# Patient Record
Sex: FEMALE | Race: WHITE | NOT HISPANIC OR LATINO | Employment: OTHER | URBAN - METROPOLITAN AREA
[De-identification: names, ages, dates, MRNs, and addresses within clinical notes are randomized per-mention and may not be internally consistent; named-entity substitution may affect disease eponyms.]

---

## 2017-02-20 ENCOUNTER — OFFICE VISIT (OUTPATIENT)
Dept: INTERNAL MEDICINE | Facility: CLINIC | Age: 77
End: 2017-02-20
Payer: MEDICARE

## 2017-02-20 ENCOUNTER — LAB VISIT (OUTPATIENT)
Dept: LAB | Facility: HOSPITAL | Age: 77
End: 2017-02-20
Attending: FAMILY MEDICINE
Payer: MEDICARE

## 2017-02-20 VITALS — WEIGHT: 163.81 LBS | BODY MASS INDEX: 26.33 KG/M2 | HEIGHT: 66 IN

## 2017-02-20 DIAGNOSIS — K25.9 GASTRIC ULCER, UNSPECIFIED CHRONICITY, UNSPECIFIED WHETHER GASTRIC ULCER HEMORRHAGE OR PERFORATION PRESENT: ICD-10-CM

## 2017-02-20 DIAGNOSIS — I10 HTN (HYPERTENSION), BENIGN: ICD-10-CM

## 2017-02-20 DIAGNOSIS — E78.5 HYPERLIPIDEMIA, UNSPECIFIED HYPERLIPIDEMIA TYPE: ICD-10-CM

## 2017-02-20 DIAGNOSIS — Z00.00 PREVENTATIVE HEALTH CARE: ICD-10-CM

## 2017-02-20 DIAGNOSIS — D50.0 IRON DEFICIENCY ANEMIA DUE TO CHRONIC BLOOD LOSS: ICD-10-CM

## 2017-02-20 DIAGNOSIS — I67.2 CEREBRAL ATHEROSCLEROSIS: ICD-10-CM

## 2017-02-20 DIAGNOSIS — I10 HTN (HYPERTENSION), BENIGN: Primary | ICD-10-CM

## 2017-02-20 LAB
ALBUMIN SERPL BCP-MCNC: 4.1 G/DL
ALP SERPL-CCNC: 65 U/L
ALT SERPL W/O P-5'-P-CCNC: 20 U/L
ANION GAP SERPL CALC-SCNC: 11 MMOL/L
AST SERPL-CCNC: 27 U/L
BASOPHILS # BLD AUTO: 0.03 K/UL
BASOPHILS NFR BLD: 0.4 %
BILIRUB SERPL-MCNC: 0.8 MG/DL
BUN SERPL-MCNC: 15 MG/DL
CALCIUM SERPL-MCNC: 8.9 MG/DL
CHLORIDE SERPL-SCNC: 104 MMOL/L
CHOLEST/HDLC SERPL: 4.9 {RATIO}
CO2 SERPL-SCNC: 26 MMOL/L
CREAT SERPL-MCNC: 1.3 MG/DL
DIFFERENTIAL METHOD: NORMAL
EOSINOPHIL # BLD AUTO: 0.1 K/UL
EOSINOPHIL NFR BLD: 1.5 %
ERYTHROCYTE [DISTWIDTH] IN BLOOD BY AUTOMATED COUNT: 14.2 %
EST. GFR  (AFRICAN AMERICAN): 45.4 ML/MIN/1.73 M^2
EST. GFR  (NON AFRICAN AMERICAN): 39.4 ML/MIN/1.73 M^2
GLUCOSE SERPL-MCNC: 88 MG/DL
HCT VFR BLD AUTO: 41.8 %
HDL/CHOLESTEROL RATIO: 20.2 %
HDLC SERPL-MCNC: 336 MG/DL
HDLC SERPL-MCNC: 68 MG/DL
HGB BLD-MCNC: 13.7 G/DL
LDLC SERPL CALC-MCNC: 236.4 MG/DL
LYMPHOCYTES # BLD AUTO: 1.8 K/UL
LYMPHOCYTES NFR BLD: 22 %
MCH RBC QN AUTO: 30.6 PG
MCHC RBC AUTO-ENTMCNC: 32.8 %
MCV RBC AUTO: 94 FL
MONOCYTES # BLD AUTO: 0.5 K/UL
MONOCYTES NFR BLD: 6.6 %
NEUTROPHILS # BLD AUTO: 5.7 K/UL
NEUTROPHILS NFR BLD: 69.4 %
NONHDLC SERPL-MCNC: 268 MG/DL
PLATELET # BLD AUTO: 223 K/UL
PMV BLD AUTO: 10.7 FL
POTASSIUM SERPL-SCNC: 4 MMOL/L
PROT SERPL-MCNC: 8 G/DL
RBC # BLD AUTO: 4.47 M/UL
SODIUM SERPL-SCNC: 141 MMOL/L
T4 FREE SERPL-MCNC: 0.48 NG/DL
TRIGL SERPL-MCNC: 158 MG/DL
TSH SERPL DL<=0.005 MIU/L-ACNC: 96.86 UIU/ML
WBC # BLD AUTO: 8.23 K/UL

## 2017-02-20 PROCEDURE — 36415 COLL VENOUS BLD VENIPUNCTURE: CPT

## 2017-02-20 PROCEDURE — 99999 PR PBB SHADOW E&M-EST. PATIENT-LVL IV: CPT | Mod: PBBFAC,,, | Performed by: FAMILY MEDICINE

## 2017-02-20 PROCEDURE — 80053 COMPREHEN METABOLIC PANEL: CPT

## 2017-02-20 PROCEDURE — 99215 OFFICE O/P EST HI 40 MIN: CPT | Mod: S$PBB,,, | Performed by: FAMILY MEDICINE

## 2017-02-20 PROCEDURE — 83036 HEMOGLOBIN GLYCOSYLATED A1C: CPT

## 2017-02-20 PROCEDURE — 85025 COMPLETE CBC W/AUTO DIFF WBC: CPT

## 2017-02-20 PROCEDURE — 84439 ASSAY OF FREE THYROXINE: CPT

## 2017-02-20 PROCEDURE — 80061 LIPID PANEL: CPT

## 2017-02-20 PROCEDURE — 84443 ASSAY THYROID STIM HORMONE: CPT

## 2017-02-20 RX ORDER — CLONIDINE HYDROCHLORIDE 0.1 MG/1
0.1 TABLET ORAL
Status: DISCONTINUED | OUTPATIENT
Start: 2017-02-20 | End: 2022-04-21

## 2017-02-20 NOTE — PROGRESS NOTES
Subjective:       Patient ID: Daniela Harris is a 76 y.o. female.    Chief Complaint: No chief complaint on file.  Daniela Harris 76 y.o. female is here for office visit to review care and physical exam, reports doing well but has concern over episode where passed out?  Is active w/o c/o today.      HPI  Review of Systems   Constitutional: Negative for activity change, fatigue, fever and unexpected weight change.   HENT: Negative for congestion, hearing loss, postnasal drip and rhinorrhea.    Eyes: Negative for redness and visual disturbance.   Respiratory: Negative for chest tightness, shortness of breath and wheezing.    Cardiovascular: Negative for chest pain, palpitations and leg swelling.   Gastrointestinal: Negative for abdominal distention.   Genitourinary: Negative for decreased urine volume, dysuria, flank pain, hematuria, pelvic pain and urgency.   Musculoskeletal: Negative for back pain, gait problem, joint swelling and neck stiffness.   Skin: Negative for color change, rash and wound.   Neurological: Negative for dizziness, syncope, weakness and headaches.   Psychiatric/Behavioral: Negative for behavioral problems, confusion and sleep disturbance. The patient is not nervous/anxious.        Objective:      Physical Exam   Constitutional: She is oriented to person, place, and time. She appears well-developed and well-nourished. No distress.   HENT:   Head: Normocephalic.   Mouth/Throat: No oropharyngeal exudate.   Eyes: EOM are normal. Pupils are equal, round, and reactive to light. No scleral icterus.   Neck: Neck supple. No JVD present. No thyromegaly present.   Cardiovascular: Normal rate, regular rhythm and normal heart sounds.  Exam reveals no gallop and no friction rub.    No murmur heard.  Pulmonary/Chest: Effort normal and breath sounds normal. She has no wheezes. She has no rales.   Abdominal: Soft. Bowel sounds are normal. She exhibits no distension and no mass. There is no tenderness. There is  no guarding.   Musculoskeletal: Normal range of motion. She exhibits no edema.   Lymphadenopathy:     She has no cervical adenopathy.   Neurological: She is alert and oriented to person, place, and time. She has normal reflexes. She displays normal reflexes. No cranial nerve deficit. She exhibits normal muscle tone.   Skin: Skin is warm. No rash noted. No erythema.   Psychiatric: She has a normal mood and affect. Thought content normal.       Assessment:       No diagnosis found.    Plan:       Daniela was seen today for follow-up.    Diagnoses and all orders for this visit:    HTN (hypertension), benign, didn't use meds this AM  -     Comprehensive metabolic panel; Future  -     cloNIDine tablet 0.1 mg; Take 1 tablet (0.1 mg total) by mouth one time.    Hyperlipidemia, unspecified hyperlipidemia type  -     Comprehensive metabolic panel; Future  -     Lipid panel; Future    Cerebral atherosclerosis  -     Ambulatory Referral to Neurology    Gastric ulcer, unspecified chronicity, unspecified whether gastric ulcer hemorrhage or perforation present  -     CBC auto differential; Future  -     Ambulatory Referral to Gastroenterology    Iron deficiency anemia due to chronic blood loss  -     CBC auto differential; Future  -     Ambulatory Referral to Gastroenterology    Preventative health care  -     Comprehensive metabolic panel; Future  -     Lipid panel; Future  -     CBC auto differential; Future  -     Hemoglobin A1c; Future  -     TSH; Future  -     Mammo Digital Screening Bilateral With CAD; Future  -     Ambulatory Referral to Gastroenterology  -     Ambulatory Referral to Neurology

## 2017-02-20 NOTE — MR AVS SNAPSHOT
[facility]  [address]  [facility] [address]  Phone: [phone]  Fax: [phone]                  Daniela Harris   2017 11:20 AM   Office Visit    Description:  Female : 1940   Provider:  Pranay Brown MD   Department:  [facility]           Reason for Visit     Follow-up           Diagnoses this Visit        Comments    HTN (hypertension), benign    -  Primary     Hyperlipidemia, unspecified hyperlipidemia type         Cerebral atherosclerosis         Gastric ulcer, unspecified chronicity, unspecified whether gastric ulcer hemorrhage or perforation present         Iron deficiency anemia due to chronic blood loss         Preventative health care                To Do List           Goals (5 Years of Data)     None      Follow-Up and Disposition     Return in about 3 months (around 2017), or if symptoms worsen or fail to improve.    Follow-up and Disposition History      [facility] On [facility]     [facility] On Call Nurse Care Line -  Assistance  Registered nurses in the [facility] On Call Center provide clinical advisement, health education, appointment booking, and other advisory services.  Call for this free service at [phone].             Medications           Message regarding Medications     Verify the changes and/or additions to your medication regime listed below are the same as discussed with your clinician today.  If any of these changes or additions are incorrect, please notify your healthcare provider.        These medications were administered today        Dose Freq    cloNIDine tablet 0.1 mg 0.1 mg Clinic/HOD 1 time    Sig: Take 1 tablet (0.1 mg total) by mouth one time.    Class: Normal    Route: Oral           Verify that the below list of medications is an accurate representation of the medications you are currently taking.  If none reported, the list may be blank. If incorrect, please contact your healthcare provider. Carry  "this list with you in case of emergency.           Current Medications     amlodipine (NORVASC) 2.5 MG tablet TAKE 1 TABLET BY MOUTH EVERY MORNING.    ascorbic acid (VITAMIN C) 500 MG tablet Take 500 mg by mouth. 1 Tablet Oral Every day    b complex vitamins (VITAMINS B COMPLEX) tablet Take 1 tablet by mouth once daily. 1 Tablet Oral Every day    CALCIUM CARBONATE (CALCIUM 600 ORAL) Take 600 mg by mouth. 1  By mouth Every day    donepezil (ARICEPT) 5 MG tablet Take 1 tablet (5 mg total) by mouth once daily.    ferrous gluconate (FERGON) 324 MG tablet Take 1 tablet (324 mg total) by mouth 2 (two) times daily with meals.    glucosamine & chondroit sul.Na 750-400 mg Cmpk Take by mouth. 1  By mouth Every day    levothyroxine (SYNTHROID) 75 MCG tablet TAKE 1 TABLET BY MOUTH BEFORE BREAKFAST.    lisinopril-hydrochlorothiazide (PRINZIDE,ZESTORETIC) 20-12.5 mg per tablet take 1 tablet by mouth every morning    multivitamin (THERAGRAN) per tablet Take by mouth. 1 Tablet Oral Every day    pravastatin (PRAVACHOL) 20 MG tablet take 1 tablet by mouth once daily    VITAMIN D2 50,000 unit capsule take 1 capsule by mouth every week    aspirin (ECOTRIN) 81 MG EC tablet Take 1 tablet (81 mg total) by mouth once daily.    pantoprazole (PROTONIX) 40 MG tablet Take 1 tablet (40 mg total) by mouth once daily.           Clinical Reference Information           Your Vitals Were     Height Weight BMI          5' 6" (1.676 m) 74.3 kg (163 lb 12.8 oz) 26.44 kg/m2        Allergies as of 2/20/2017     Keflex  [Cephalexin]      Immunizations Administered on Date of Encounter - 2/20/2017     None      Orders Placed During Today's Visit      Normal Orders This Visit    Ambulatory Referral to Gastroenterology     Ambulatory Referral to Neurology     Future Labs/Procedures Expected by Expires    CBC auto differential  2/20/2017 2/20/2018    Comprehensive metabolic panel  2/20/2017 2/20/2018    Hemoglobin A1c  2/20/2017 2/20/2018    Lipid panel  " 2/20/2017 2/20/2018    Mammo Digital Screening Bilateral With CAD  2/20/2017 4/20/2018    TSH  2/20/2017 5/21/2017      [facility] Sign-Up     Activating your [facility] account is as easy as 1-2-3!     1) Visit [email or web address], select Sign Up Now, enter this activation code and your date of birth, then select Next.  [email or web address]  Expires: 4/6/2017 11:44 AM      2) Create a username and password to use when you visit [facility] in the future and select a security question in case you lose your password and select Next.    3) Enter your e-mail address and click Sign Up!    Additional Information  If you have questions, please e-mail [email or web address] or call [phone] to talk to our [facility] staff. Remember, [facility] is NOT to be used for urgent needs. For medical emergencies, dial 911.         Language Assistance Services     ATTENTION: Language assistance services are available, free of charge. Please call [phone].      ATENCIÓN: Si habla español, tiene a mandel disposición servicios gratuitos de asistencia lingüística. Llame al [phone].     JESUS Ý: N?u b?n nói Ti?ng Vi?t, có các d?ch v? h? tr? ngôn ng? mi?n phí dành cho b?n. G?i s? [phone].         [facility] complies with applicable Federal civil rights laws and does not discriminate on the basis of race, color, national origin, age, disability, or sex.

## 2017-02-21 LAB
ESTIMATED AVG GLUCOSE: 111 MG/DL
HBA1C MFR BLD HPLC: 5.5 %

## 2017-02-21 NOTE — PROGRESS NOTES
Called and advised labs good but thyroid level loq, use thyroid pill every day, away from vitamins, food or other drugs.  Lab needed 1-3 months

## 2017-03-23 ENCOUNTER — HOSPITAL ENCOUNTER (EMERGENCY)
Facility: HOSPITAL | Age: 77
Discharge: HOME | End: 2017-03-23
Attending: EMERGENCY MEDICINE
Payer: MEDICARE

## 2017-03-23 ENCOUNTER — HOSPITAL ENCOUNTER (OUTPATIENT)
Dept: RADIOLOGY | Facility: HOSPITAL | Age: 77
Discharge: HOME | End: 2017-03-23
Attending: FAMILY MEDICINE
Payer: MEDICARE

## 2017-03-23 ENCOUNTER — OFFICE VISIT (OUTPATIENT)
Dept: GASTROENTEROLOGY | Facility: CLINIC | Age: 77
End: 2017-03-23
Payer: MEDICARE

## 2017-03-23 VITALS
DIASTOLIC BLOOD PRESSURE: 84 MMHG | HEART RATE: 52 BPM | TEMPERATURE: 98 F | WEIGHT: 150 LBS | OXYGEN SATURATION: 97 % | SYSTOLIC BLOOD PRESSURE: 208 MMHG | RESPIRATION RATE: 16 BRPM | BODY MASS INDEX: 23.49 KG/M2

## 2017-03-23 VITALS
SYSTOLIC BLOOD PRESSURE: 240 MMHG | HEIGHT: 67 IN | WEIGHT: 166.25 LBS | BODY MASS INDEX: 26.09 KG/M2 | DIASTOLIC BLOOD PRESSURE: 108 MMHG | HEART RATE: 59 BPM

## 2017-03-23 DIAGNOSIS — I10 ESSENTIAL HYPERTENSION: Primary | ICD-10-CM

## 2017-03-23 DIAGNOSIS — Z12.31 VISIT FOR SCREENING MAMMOGRAM: ICD-10-CM

## 2017-03-23 DIAGNOSIS — K25.9 GASTRIC ULCER, UNSPECIFIED CHRONICITY, UNSPECIFIED WHETHER GASTRIC ULCER HEMORRHAGE OR PERFORATION PRESENT: ICD-10-CM

## 2017-03-23 DIAGNOSIS — Z00.00 PREVENTATIVE HEALTH CARE: ICD-10-CM

## 2017-03-23 DIAGNOSIS — K21.9 GASTROESOPHAGEAL REFLUX DISEASE, ESOPHAGITIS PRESENCE NOT SPECIFIED: Primary | ICD-10-CM

## 2017-03-23 DIAGNOSIS — I16.0 ASYMPTOMATIC HYPERTENSIVE URGENCY: ICD-10-CM

## 2017-03-23 PROCEDURE — 93010 ELECTROCARDIOGRAM REPORT: CPT | Mod: ,,, | Performed by: INTERNAL MEDICINE

## 2017-03-23 PROCEDURE — 77067 SCR MAMMO BI INCL CAD: CPT | Mod: 26,,, | Performed by: RADIOLOGY

## 2017-03-23 PROCEDURE — 25000003 PHARM REV CODE 250: Performed by: EMERGENCY MEDICINE

## 2017-03-23 PROCEDURE — 99283 EMERGENCY DEPT VISIT LOW MDM: CPT | Mod: ,,, | Performed by: EMERGENCY MEDICINE

## 2017-03-23 PROCEDURE — 99204 OFFICE O/P NEW MOD 45 MIN: CPT | Mod: S$PBB,GC,, | Performed by: INTERNAL MEDICINE

## 2017-03-23 PROCEDURE — 99999 PR PBB SHADOW E&M-EST. PATIENT-LVL IV: CPT | Mod: PBBFAC,GC,, | Performed by: INTERNAL MEDICINE

## 2017-03-23 PROCEDURE — 99284 EMERGENCY DEPT VISIT MOD MDM: CPT | Mod: 27

## 2017-03-23 RX ORDER — AMLODIPINE BESYLATE 2.5 MG/1
5 TABLET ORAL DAILY
Qty: 30 TABLET | Refills: 6 | Status: SHIPPED | OUTPATIENT
Start: 2017-03-23 | End: 2017-03-27 | Stop reason: SDUPTHER

## 2017-03-23 RX ORDER — AMLODIPINE BESYLATE 5 MG/1
5 TABLET ORAL
Status: COMPLETED | OUTPATIENT
Start: 2017-03-23 | End: 2017-03-23

## 2017-03-23 RX ADMIN — AMLODIPINE BESYLATE 5 MG: 5 TABLET ORAL at 07:03

## 2017-03-23 NOTE — PROGRESS NOTES
GASTROENTEROLOGY CLINIC NOTE    Reason for visit: Gastric ulcer  Referring provider/PCP: Pranay Brown MD    HPI:  Daniela Harris is a 76 y.o. female with history of gastric ulcers here for follow up. She reports no active GI symptoms. She had gastric ulcers in 2014 (H.pylori negative, assumed NSAID) and repeat EGD on 2015 showed complete healing of ulcers. She is avarage risk for colorectal cancer and also had colonoscopy in 2014, good prep, reached to cecum and no polyps were removed. She otherwise reports being in good health and here with her  Juan Alberto.     GI ROS:  Reflux: Yes, takes Protonix 40 mg in AM. Well controlled.   Dysphagia: No   Bowel habits: regular, daily.   Rectal bleeding/Melena/hematemesis: No  NSAIDs: No  Anticoagulation: No  Anti-platelet therapy: No    Prior GI studies:  As above.     Review of Systems:  Constitutional: no fever, chills.   Eyes: no visual changes    ENT: no sore throat or dysphagia  Respiratory: no cough or shortness of breath    Cardiovascular: no chest pain or palpitations    Gastrointestinal: as per HPI  Hematologic/Lymphatic: No petechia  Musculoskeletal: no arthralgias or myalgias    Neurological: no seizures, tremors or change in mental status  Behavioral/Psych: No suicidal ideation    Past Medical History: has a past medical history of After-cataract, obscuring vision - Left Eye; Arthritis; Diverticulitis; Goiter; History of appendectomy; Hyperlipidemia; Hypothyroidism; Osteopenia; Peptic ulcer; Psoriasis; Stroke; Unspecified essential hypertension; Unspecified vitamin D deficiency; and Vitamin B 12 deficiency.    Past Surgical History: has a past surgical history that includes Thyroid surgery; Appendectomy; TONSILLECTOMY, ADENOIDECTOMY; Tubal ligation; Cataract extraction w/ intraocular lens  implant, bilateral; Tonsillectomy; Cataract extraction; Colonoscopy; and Esophagogastroduodenoscopy.    Family History:family history includes Alzheimer's disease in her  father; Cataracts in her father; Diabetes in her mother and sister; Hyperlipidemia in her sister; Hypertension in her father, sister, and sister; No Known Problems in her brother, maternal aunt, maternal grandfather, maternal uncle, paternal aunt, paternal grandfather, paternal grandmother, and paternal uncle; Parkinsonism in her sister; Thyroid disease in her cousin, maternal grandmother, and sister. There is no history of Amblyopia, Blindness, Cancer, Glaucoma, Macular degeneration, Retinal detachment, Strabismus, or Stroke.    Allergies: Reviewed in EPIC.     Social History: reports that she has never smoked. She has never used smokeless tobacco. She reports that she does not drink alcohol or use illicit drugs.    Home medications:   Current Outpatient Prescriptions on File Prior to Visit   Medication Sig Dispense Refill    amlodipine (NORVASC) 2.5 MG tablet TAKE 1 TABLET BY MOUTH EVERY MORNING. 30 tablet 6    ascorbic acid (VITAMIN C) 500 MG tablet Take 500 mg by mouth. 1 Tablet Oral Every day      aspirin (ECOTRIN) 81 MG EC tablet Take 1 tablet (81 mg total) by mouth once daily. 30 tablet 5    b complex vitamins (VITAMINS B COMPLEX) tablet Take 1 tablet by mouth once daily. 1 Tablet Oral Every day 30 tablet 6    CALCIUM CARBONATE (CALCIUM 600 ORAL) Take 600 mg by mouth. 1  By mouth Every day      donepezil (ARICEPT) 5 MG tablet Take 1 tablet (5 mg total) by mouth once daily. 30 tablet 6    ferrous gluconate (FERGON) 324 MG tablet Take 1 tablet (324 mg total) by mouth 2 (two) times daily with meals. 60 tablet 3    glucosamine & chondroit sul.Na 750-400 mg Cmpk Take by mouth. 1  By mouth Every day      levothyroxine (SYNTHROID) 75 MCG tablet TAKE 1 TABLET BY MOUTH BEFORE BREAKFAST. 30 tablet 0    lisinopril-hydrochlorothiazide (PRINZIDE,ZESTORETIC) 20-12.5 mg per tablet take 1 tablet by mouth every morning 30 tablet 1    multivitamin (THERAGRAN) per tablet Take by mouth. 1 Tablet Oral Every day       "pantoprazole (PROTONIX) 40 MG tablet Take 1 tablet (40 mg total) by mouth once daily. 30 tablet 11    pravastatin (PRAVACHOL) 20 MG tablet take 1 tablet by mouth once daily 30 tablet 1    VITAMIN D2 50,000 unit capsule take 1 capsule by mouth every week 12 capsule 4     Current Facility-Administered Medications on File Prior to Visit   Medication Dose Route Frequency Provider Last Rate Last Dose    cloNIDine tablet 0.1 mg  0.1 mg Oral 1 time in Clinic/HOD Pranay Brown MD           Vital signs:  BP (!) 240/108  Pulse (!) 59  Ht 5' 7" (1.702 m)  Wt 75.4 kg (166 lb 3.6 oz)  BMI 26.03 kg/m2    Physical exam:  General: NAD, WBWD  HEENT: Head: Normocephalic, atraumatic.   Eyes: Sclera non-icteric  Neck: Supple  Heart: Regular rate and rythm  Lungs: Clear to auscultation  Abdomen: Bowel sounds normal, no organomegaly, masses, or hernia. No tenderness. No rebound or guarding.   Rectal: Deferred  Extremities: No deformities, no C/C/E  Neurologic: Able to follow commands and move 4 extremities. AOX3    Routine labs:  Lab Results   Component Value Date    WBC 8.23 02/20/2017    HGB 13.7 02/20/2017    HCT 41.8 02/20/2017    MCV 94 02/20/2017     02/20/2017     No results found for: INR  Lab Results   Component Value Date    IRON 85 03/02/2015    FERRITIN 31 03/02/2015    TIBC 395 03/02/2015    FESATURATED 22 03/02/2015     Lab Results   Component Value Date     02/20/2017    K 4.0 02/20/2017     02/20/2017    CO2 26 02/20/2017    BUN 15 02/20/2017    CREATININE 1.3 02/20/2017     Lab Results   Component Value Date    ALBUMIN 4.1 02/20/2017    ALT 20 02/20/2017    AST 27 02/20/2017    ALKPHOS 65 02/20/2017    BILITOT 0.8 02/20/2017       Assessment:  Daniela Harris is a 76 y.o. female with gastric ulcers. Completely healed on follow up EGD. Up to date with screening colonoscopy. She is no longer anemic. Her GERD symptoms are well controlled on current regimen. We will see her as needed basis. Her " blood pressure was consistently elevated during office visit, repeat measurements were showed BP of 240/108. She was asymptomatic. We referred her to ER for BP control. She will also follow up with her PCP.    Kayla Nix MD  Gastroenterology & Hepatology Fellow  Pager: [phone]

## 2017-03-23 NOTE — ED AVS SNAPSHOT
[facility]  [address]  [facility] [address]               Daniela Harris   3/23/2017  6:58 PM   ED    Description:  Female : 1940   Department:  [facility]           Your Care was Coordinated By:     Provider Role From To    Keven Li MD Attending Provider [phone] --      Reason for Visit     Hypertension           Diagnoses this Visit        Comments    Essential hypertension    -  Primary       ED Disposition     ED Disposition Condition Comment    Discharge             To Do List           Follow-up Information     Follow up with Pranay Brown MD.    Specialty:  Family Medicine    Why:  this week    Contact information:    [address]  [facility] [address]  [phone]         These Medications        Disp Refills Start End    amlodipine (NORVASC) 2.5 MG tablet 30 tablet 6 3/23/2017     Take 2 tablets (5 mg total) by mouth once daily. - Oral    Pharmacy: [facility] [address] [place] [id number] [facility] [address] Ph #: [phone]         [facility] On Call     [facility] On Call Nurse Care Line -  Assistance  Registered nurses in the [facility] On Call Center provide clinical advisement, health education, appointment booking, and other advisory services.  Call for this free service at [phone].             Medications           Message regarding Medications     Verify the changes and/or additions to your medication regime listed below are the same as discussed with your clinician today.  If any of these changes or additions are incorrect, please notify your healthcare provider.        These medications were administered today        Dose Freq    amlodipine tablet 5 mg 5 mg ED 1 Time    Sig: Take 1 tablet (5 mg total) by mouth ED 1 Time.    Class: Normal    Route: Oral      CHANGE how you are taking these medications     Start Taking Instead of    amlodipine (NORVASC) 2.5 MG tablet amlodipine (NORVASC) 2.5 MG  tablet    Dosage:  Take 2 tablets (5 mg total) by mouth once daily. Dosage:  TAKE 1 TABLET BY MOUTH EVERY MORNING.           Verify that the below list of medications is an accurate representation of the medications you are currently taking.  If none reported, the list may be blank. If incorrect, please contact your healthcare provider. Carry this list with you in case of emergency.           Current Medications     amlodipine (NORVASC) 2.5 MG tablet Take 2 tablets (5 mg total) by mouth once daily.    amlodipine tablet 5 mg Take 1 tablet (5 mg total) by mouth ED 1 Time.    ascorbic acid (VITAMIN C) 500 MG tablet Take 500 mg by mouth. 1 Tablet Oral Every day    aspirin (ECOTRIN) 81 MG EC tablet Take 1 tablet (81 mg total) by mouth once daily.    b complex vitamins (VITAMINS B COMPLEX) tablet Take 1 tablet by mouth once daily. 1 Tablet Oral Every day    CALCIUM CARBONATE (CALCIUM 600 ORAL) Take 600 mg by mouth. 1  By mouth Every day    donepezil (ARICEPT) 5 MG tablet Take 1 tablet (5 mg total) by mouth once daily.    ferrous gluconate (FERGON) 324 MG tablet Take 1 tablet (324 mg total) by mouth 2 (two) times daily with meals.    glucosamine & chondroit sul.Na 750-400 mg Cmpk Take by mouth. 1  By mouth Every day    levothyroxine (SYNTHROID) 75 MCG tablet TAKE 1 TABLET BY MOUTH BEFORE BREAKFAST.    lisinopril-hydrochlorothiazide (PRINZIDE,ZESTORETIC) 20-12.5 mg per tablet take 1 tablet by mouth every morning    multivitamin (THERAGRAN) per tablet Take by mouth. 1 Tablet Oral Every day    pantoprazole (PROTONIX) 40 MG tablet Take 1 tablet (40 mg total) by mouth once daily.    pravastatin (PRAVACHOL) 20 MG tablet take 1 tablet by mouth once daily    VITAMIN D2 50,000 unit capsule take 1 capsule by mouth every week           Clinical Reference Information           Your Vitals Were     BP Pulse Temp Resp Weight SpO2    232/94 54 98.4 °F (36.9 °C) (Oral) 16 68 kg (150 lb) 94%    BMI                23.49 kg/m2           Allergies as of 3/23/2017        Reactions    Keflex  [Cephalexin]     Other reaction(s): Rash      Immunizations Administered on Date of Encounter - 3/23/2017     None      ED Micro, Lab, POCT     None      ED Imaging Orders     None        Discharge Instructions       Take the increased dose of amlodipine 5 mg per day  Follow up with Dr. Brown as discussed    Your Scheduled Appointments     Mar 27, 2017  1:40 PM CDT   Consult with Junior Mccann III, MD   [facility] ([facility] )    [address]  [place] [id number] [phone]            May 23, 2017  2:20 PM CDT   Established Patient Visit with Pranay Brown MD   [facility] ([facility])    [address]  [place] [id number] [phone]              [facility] Sign-Up     Activating your [facility] account is as easy as 1-2-3!     1) Visit [email or web address], select Sign Up Now, enter this activation code and your date of birth, then select Next.  [email or web address]  Expires: 4/6/2017 12:44 PM      2) Create a username and password to use when you visit [facility] in the future and select a security question in case you lose your password and select Next.    3) Enter your e-mail address and click Sign Up!    Additional Information  If you have questions, please e-mail [email or web address] or call [phone] to talk to our [facility] staff. Remember, [facility] is NOT to be used for urgent needs. For medical emergencies, dial 911.          [facility] complies with applicable Federal civil rights laws and does not discriminate on the basis of race, color, national origin, age, disability, or sex.        Language Assistance Services     ATTENTION: Language assistance services are available, free of charge. Please call [phone].      ATENCIÓN: Si habla español, tiene a mandel disposición servicios gratuitos de asistencia lingüística. Llame al  [phone].     JESUS Ý: N?u b?n nói Ti?ng Vi?t, có các d?ch v? h? tr? ngôn ng? mi?n phí dành cho b?n. G?i s? [phone].

## 2017-03-23 NOTE — PROVIDER PROGRESS NOTES - EMERGENCY DEPT.
Encounter Date: 3/23/2017    ED Physician Progress Notes       SCRIBE NOTE: I, Hilary Ward, am scribing for, and in the presence of,  Dr. Li.  Physician Statement: I, Dr. Li, personally performed the services described in this documentation as scribed by Hilary Ward in my presence, and it is both accurate and complete.      EKG - STEMI Decision  Initial Reading: No STEMI present.

## 2017-03-23 NOTE — PROGRESS NOTES
I was present with Kayla Nix MD the fellow during the above evaluation, including history and exam.  I discussed the case with the fellow and agree with the findings and plan as documented in the fellow's note.

## 2017-03-24 NOTE — ED PROVIDER NOTES
Encounter Date: 3/23/2017    SCRIBE #1 NOTE: I, Hilary Ward, am scribing for, and in the presence of,  Dr. Li. I have scribed the entire note.       History     Chief Complaint   Patient presents with    Hypertension     from gastro clinic; denies chest pain, SOB, headache     Review of patient's allergies indicates:   Allergen Reactions    Keflex  [cephalexin]      Other reaction(s): Rash     HPI Comments: Patient sent down from the clinic because of an elevated blood pressure; patient is on amlodipine patient denies any headache nausea vomiting chest pain shortness of breath    The history is provided by the patient.     Past Medical History:   Diagnosis Date    After-cataract, obscuring vision - Left Eye 2/28/2014    Arthritis     Diverticulitis     Goiter     MNG    History of appendectomy     Hyperlipidemia     following diet    Hypothyroidism     s/p surgery    Osteopenia     Other and unspecified hyperlipidemia     Peptic ulcer     x 2    Psoriasis     no active illness for years    Stroke     Unspecified essential hypertension     Unspecified vitamin D deficiency     Vitamin B 12 deficiency      Past Surgical History:   Procedure Laterality Date    APPENDECTOMY      CATARACT EXTRACTION      ou    CATARACT EXTRACTION W/ INTRAOCULAR LENS  IMPLANT, BILATERAL      COLONOSCOPY      ESOPHAGOGASTRODUODENOSCOPY      THYROID SURGERY      Total    TONSILLECTOMY      TONSILLECTOMY, ADENOIDECTOMY      TUBAL LIGATION       Family History   Problem Relation Age of Onset    Alzheimer's disease Father     Hypertension Father     Cataracts Father     Dementia Father     Diabetes Sister     Hypertension Sister     Hyperlipidemia Sister     Thyroid disease Sister     Parkinsonism Sister     Hypertension Sister     Diabetes Mother     Thyroid disease Maternal Grandmother      MNG    Thyroid disease Cousin     No Known Problems Brother     No Known Problems Maternal Aunt      Dementia Maternal Uncle     No Known Problems Paternal Aunt     No Known Problems Paternal Uncle     No Known Problems Maternal Grandfather     No Known Problems Paternal Grandmother     No Known Problems Paternal Grandfather     Amblyopia Neg Hx     Blindness Neg Hx     Cancer Neg Hx     Glaucoma Neg Hx     Macular degeneration Neg Hx     Retinal detachment Neg Hx     Strabismus Neg Hx     Stroke Neg Hx      Social History   Substance Use Topics    Smoking status: Never Smoker    Smokeless tobacco: Never Used    Alcohol use No     Review of Systems   Constitutional: Negative for activity change, chills and fatigue.   Eyes: Negative for photophobia and visual disturbance.   Respiratory: Negative for chest tightness and shortness of breath.    Cardiovascular: Negative for chest pain and palpitations.   Gastrointestinal: Negative for nausea and vomiting.   Genitourinary: Negative for difficulty urinating.   Neurological: Negative for weakness.       Physical Exam   Initial Vitals   BP Pulse Resp Temp SpO2   03/23/17 1526 03/23/17 1526 03/23/17 1526 03/23/17 1526 03/23/17 1526   240/108 67 16 98.5 °F (36.9 °C) 98 %     Physical Exam    Constitutional: She is cooperative. She does not appear ill. No distress.   HENT:   Head: Normocephalic.   Mouth/Throat: Oropharynx is clear and moist.   Eyes: Conjunctivae and lids are normal.   Neck: Normal range of motion. Neck supple.   Cardiovascular: Normal rate, regular rhythm and normal heart sounds.   Pulmonary/Chest: Breath sounds normal. No tachypnea. No respiratory distress.   Neurological: She is alert and oriented to person, place, and time. No cranial nerve deficit.   Skin: Skin is warm and dry.         ED Course   Procedures  Labs Reviewed - No data to display          Medical Decision Making:   Initial Assessment:   Patient sent down from the gastroenterology clinic because of elevated blood pressures patient had a procedure today patient is on  amlodipine has no complaints  Differential Diagnosis:   Hypertension  ED Management:  It is felt that there is no need for blood work EKG patient will be given amlodipine her medication and observed            Scribe Attestation:   Scribe #1: I performed the above scribed service and the documentation accurately describes the services I performed. I attest to the accuracy of the note.    Attending Attestation:           Physician Attestation for Scribe:  Physician Attestation Statement for Scribe #1: I, Dr. Li, reviewed documentation, as scribed by Hilary Ward in my presence, and it is both accurate and complete.         Attending ED Notes:   Patient sent to the ED because of elevated blood pressure this was from the gastroenterology clinic patient was given amlodipine and observed in the ED with improvement of her pressure that she is discharged and she is to follow-up with her primary care physician as needed          ED Course     Clinical Impression:   The encounter diagnosis was Essential hypertension.    Disposition:   Disposition: Discharged  Condition: Stable       Keven Li MD  04/02/17 4092

## 2017-03-24 NOTE — ED TRIAGE NOTES
Patient went to gastro clinic for procedure today, was sent here for further evaluation of hypertension.

## 2017-03-27 ENCOUNTER — LAB VISIT (OUTPATIENT)
Dept: LAB | Facility: HOSPITAL | Age: 77
End: 2017-03-27
Payer: MEDICARE

## 2017-03-27 ENCOUNTER — OFFICE VISIT (OUTPATIENT)
Dept: NEUROLOGY | Facility: CLINIC | Age: 77
End: 2017-03-27
Payer: MEDICARE

## 2017-03-27 VITALS
SYSTOLIC BLOOD PRESSURE: 154 MMHG | WEIGHT: 164.44 LBS | BODY MASS INDEX: 25.81 KG/M2 | DIASTOLIC BLOOD PRESSURE: 83 MMHG | HEIGHT: 67 IN | HEART RATE: 66 BPM

## 2017-03-27 DIAGNOSIS — E89.0 POSTSURGICAL HYPOTHYROIDISM: ICD-10-CM

## 2017-03-27 DIAGNOSIS — I67.2 CEREBRAL ATHEROSCLEROSIS: ICD-10-CM

## 2017-03-27 DIAGNOSIS — I10 HTN (HYPERTENSION), BENIGN: ICD-10-CM

## 2017-03-27 DIAGNOSIS — F02.80 LATE ONSET ALZHEIMER'S DISEASE WITHOUT BEHAVIORAL DISTURBANCE: ICD-10-CM

## 2017-03-27 DIAGNOSIS — D51.9 ANEMIA DUE TO VITAMIN B12 DEFICIENCY, UNSPECIFIED B12 DEFICIENCY TYPE: ICD-10-CM

## 2017-03-27 DIAGNOSIS — F02.80 LATE ONSET ALZHEIMER'S DISEASE WITHOUT BEHAVIORAL DISTURBANCE: Primary | ICD-10-CM

## 2017-03-27 DIAGNOSIS — G30.1 LATE ONSET ALZHEIMER'S DISEASE WITHOUT BEHAVIORAL DISTURBANCE: ICD-10-CM

## 2017-03-27 DIAGNOSIS — G30.1 LATE ONSET ALZHEIMER'S DISEASE WITHOUT BEHAVIORAL DISTURBANCE: Primary | ICD-10-CM

## 2017-03-27 LAB
TSH SERPL DL<=0.005 MIU/L-ACNC: 102.43 UIU/ML
VIT B12 SERPL-MCNC: 506 PG/ML

## 2017-03-27 PROCEDURE — 99214 OFFICE O/P EST MOD 30 MIN: CPT | Mod: S$PBB,,,

## 2017-03-27 PROCEDURE — 84439 ASSAY OF FREE THYROXINE: CPT

## 2017-03-27 PROCEDURE — 83921 ORGANIC ACID SINGLE QUANT: CPT

## 2017-03-27 PROCEDURE — 82607 VITAMIN B-12: CPT

## 2017-03-27 PROCEDURE — 99354 PR PROLONGED SVC, OUPT, 1ST HR: CPT | Mod: S$PBB,,,

## 2017-03-27 PROCEDURE — 84443 ASSAY THYROID STIM HORMONE: CPT

## 2017-03-27 PROCEDURE — 99999 PR PBB SHADOW E&M-EST. PATIENT-LVL III: CPT | Mod: PBBFAC,,,

## 2017-03-27 RX ORDER — LEVOTHYROXINE SODIUM 75 UG/1
75 TABLET ORAL
Qty: 30 TABLET | Refills: 0 | Status: SHIPPED | OUTPATIENT
Start: 2017-03-27 | End: 2017-08-23

## 2017-03-27 RX ORDER — AMLODIPINE BESYLATE 10 MG/1
10 TABLET ORAL DAILY
Qty: 30 TABLET | Refills: 6 | Status: SHIPPED | OUTPATIENT
Start: 2017-03-27 | End: 2017-03-27 | Stop reason: SDUPTHER

## 2017-03-27 RX ORDER — AMLODIPINE BESYLATE 10 MG/1
10 TABLET ORAL DAILY
Qty: 30 TABLET | Refills: 6 | Status: SHIPPED | OUTPATIENT
Start: 2017-03-27 | End: 2017-05-04 | Stop reason: SDUPTHER

## 2017-03-27 RX ORDER — LISINOPRIL AND HYDROCHLOROTHIAZIDE 12.5; 2 MG/1; MG/1
1 TABLET ORAL EVERY MORNING
Qty: 30 TABLET | Refills: 11 | Status: SHIPPED | OUTPATIENT
Start: 2017-03-27 | End: 2017-03-27 | Stop reason: SDUPTHER

## 2017-03-27 RX ORDER — LEVOTHYROXINE SODIUM 75 UG/1
75 TABLET ORAL
Qty: 30 TABLET | Refills: 11 | Status: SHIPPED | OUTPATIENT
Start: 2017-03-27 | End: 2017-05-04 | Stop reason: SDUPTHER

## 2017-03-27 RX ORDER — DONEPEZIL HYDROCHLORIDE 5 MG/1
10 TABLET, FILM COATED ORAL DAILY
Qty: 30 TABLET | Refills: 1 | Status: SHIPPED | OUTPATIENT
Start: 2017-03-27 | End: 2017-03-28 | Stop reason: SDUPTHER

## 2017-03-27 RX ORDER — LISINOPRIL AND HYDROCHLOROTHIAZIDE 12.5; 2 MG/1; MG/1
1 TABLET ORAL EVERY MORNING
Qty: 30 TABLET | Refills: 6 | Status: SHIPPED | OUTPATIENT
Start: 2017-03-27 | End: 2017-04-27

## 2017-03-27 NOTE — PROGRESS NOTES
This office note has been dictated.  This is a patient of Dr. Pranay Brown.    Dear Dr. Brown:    I saw your patient, Sharif Harris, in my office on 03/27/2016.  This 77-year-old   right-handed lady presents to Neurology Clinic with a history of Alzheimer   dementia and cerebrovascular disease.  Perhaps two to hree years ago, she had an   episode of syncope.  She was found to be acutely anemic from a bleeding ulcer.    There was some concern that she may have had an ischemic infarction, but the MR   imaging did not confirm this.  She improved following transfusion and was   followed for a period of time in Neurology Clinic by our nurse practitioner,   Jessica King.  At the time of her last Neurology Clinic visit over a year ago,   she was prescribed Aricept and an aspirin tablet a day.  It was thought that she   may be a candidate for Namenda based on her progression of disease.  According   to her , the big issue with her is keeping track of time.  Apparently,   she is still able to manage her activities of daily living, does a little   cooking around the house, and even drives short distances in the neighborhood.    There is no behavioral symptomatology reported.    NEUROLOGICAL REVIEW OF SYSTEMS:  She denies fever, chills, sweats, chest pain,   shortness of breath, headaches, nausea, vomiting, syncope, neck, arm, back or   leg pain, paresthesias, weakness in the extremities, tremor, diplopia, visual   loss, dizziness, tinnitus, hearing loss, speech or swallowing difficulty,   trouble sleeping, bowel or bladder incontinence.    PAST MEDICAL HISTORY:  Reviewed with the patient, edited by me in the electronic   record and it is included in this note.    PHYSICAL EXAMINATION:  GENERAL:  This is an alert lady who is oriented to the day, but not to the month   or to the year.  She has a somewhat superficial, but otherwise appropriate and   adequately articulated speech.  Vital signs are reviewed and also included  "in   this note.  She is mildly hypertensive and I have asked her to follow this up   with you.  She is neatly dressed, in no acute distress.  MENTAL STATUS EXAMINATION:  She has good immediate recall, but cannot bring back   any of three objects at 5 or 10 minutes.  When cues are provided, her   performance improves to two out of three and then three out three at 10 minutes.    She cannot name the President or the current Governor of [place], spell the   word "world" backwards and is concrete in proverb interpretations.  She can   subtract 7 from 100 a single time, but does a reasonably good job reproducing   complex drawings.    Cranial nerve examination reveals her acuity to be 20/20 to   the near card with correction.  The pupils are postoperative.  The visual fields   are full and eye movements are conjugate without nystagmus.  There are no   sensorimotor deficits on the face and tongue and palate are in the midline.    Hearing is equal in the two ears.  NECK:  Supple.  The pulses are equal and no bruits are heard.  NEUROLOGIC:  On motor examination, there is no drift to the upper extremities.    She has good  strength and normal muscle tone.    On sensory examination, all modalities are intact and she has palpable peripheral pulses.    On cerebellar testing, finger-to-nose is adequately performed with a bit of perseveration.    Fine motor and rapid alternating movements may be a bit better on the right than the left side.    She can rise from the chair and walks well on a narrow base.    Romberg sign is not present.    The deep tendon reflexes are symmetrical and her plantar responses flexor bilaterally.    She has snout and grasping reflexes.    I reviewed her clinic record including her MR imaging on the computer console.    I discussed the situation with her and her  in detail.    In conclusion, this is a lady with a history of cerebrovascular disease and   Alzheimer's type dementia, on the " basis of neuropsychological testing performed   by Dr. Kurtz a few years back.  She is functioning reasonably well at this time   on Aricept 5 mg.  I will go ahead and increase the dose to 10 mg a day.  I   advised the patient that if she has any trouble with this dose of the   medication, she should just break them in half and go back to her regular dose   and notify me the situation.  In reviewing her records, she has had an abnormal   EEG in the past, as well as a low B12, and more recently very high TSH.  I am   going to request a repeat blood work today, and tentatively schedule her for a   followup EEG.  In reviewing the list of medications, the patient and her    are unclear as to what she is actually taking at this time.  In particular,   there is a question of whether she is still taking an aspirin tablet a day.    With her cerebrovascular disease, I think this would be advantageous, but asked   that she obtain permission first from GI before resuming enteric-coated aspirin   81 mg tablet daily.  I will have her return for a followup in two months' time,   at which point I will review the situation with her again and make any   additional recommendations as indicated.  I am otherwise returning her to your   care for followup.      In the meantime, it has been a pleasure to see this lady in   consultation.  If I could be of help to you in the future, please let me know.    This is a 60-minute clinical encounter, half devoted to review of medical   records, medical counseling and decision making.    Sincerely,      SARAHI  dd: 03/27/2017 13:29:45 (CDT)  td: 03/27/2017 15:44:27 (CDT)  Doc ID   #[id number]  Job ID #[id number]    CC:

## 2017-03-27 NOTE — MR AVS SNAPSHOT
[facility] Neurology  [address]  [facility] [address]  Phone: [phone]  Fax: [phone]                  Daniela Harris   3/27/2017 1:40 PM   Office Visit    Description:  Female : 1940   Provider:  Junior Mccann III, MD   [facility]           Reason for Visit     Consult           Diagnoses this Visit        Comments    Late onset Alzheimer's disease without behavioral disturbance    -  Primary     Cerebral atherosclerosis         HTN (hypertension), benign         Anemia due to vitamin B12 deficiency, unspecified B12 deficiency type         Postsurgical hypothyroidism                To Do List           Future Appointments        Provider Department Dept Phone    3/27/2017 1:40 PM Junior Mccann III, MD [facility] Neurology [phone]    2017 1:00 PM Pranay Brown MD [facility] Internal Medicine [phone]    2017 2:20 PM Pranay Brown MD [facility] Internal Medicine [phone]      Goals (5 Years of Data)     None      Follow-Up and Disposition     Return in about 2 months (around 2017).       These Medications        Disp Refills Start End    donepezil (ARICEPT) 5 MG tablet 30 tablet 1 3/27/2017     Take 2 tablets (10 mg total) by mouth once daily. - Oral    Pharmacy: [address] [address] [facility] [address] Ph #: [phone]         [facility] On [facility]     [facility] On Call Nurse Care Line -  Assistance  Registered nurses in the [facility] Center provide clinical advisement, health education, appointment booking, and other advisory services.  Call for this free service at [phone].             Medications           Message regarding Medications     Verify the changes and/or additions to your medication regime listed below are the same as discussed with your clinician today.  If any of these changes or additions are incorrect, please notify your healthcare provider.        CHANGE how you are taking these  medications     Start Taking Instead of    donepezil (ARICEPT) 5 MG tablet donepezil (ARICEPT) 5 MG tablet    Dosage:  Take 2 tablets (10 mg total) by mouth once daily. Dosage:  Take 1 tablet (5 mg total) by mouth once daily.    Reason for Change:  Reorder            Verify that the below list of medications is an accurate representation of the medications you are currently taking.  If none reported, the list may be blank. If incorrect, please contact your healthcare provider. Carry this list with you in case of emergency.           Current Medications     amlodipine (NORVASC) 10 MG tablet Take 1 tablet (10 mg total) by mouth once daily.    ascorbic acid (VITAMIN C) 500 MG tablet Take 500 mg by mouth. 1 Tablet Oral Every day    b complex vitamins (VITAMINS B COMPLEX) tablet Take 1 tablet by mouth once daily. 1 Tablet Oral Every day    CALCIUM CARBONATE (CALCIUM 600 ORAL) Take 600 mg by mouth. 1  By mouth Every day    donepezil (ARICEPT) 5 MG tablet Take 2 tablets (10 mg total) by mouth once daily.    ferrous gluconate (FERGON) 324 MG tablet Take 1 tablet (324 mg total) by mouth 2 (two) times daily with meals.    glucosamine & chondroit sul.Na 750-400 mg Cmpk Take by mouth. 1  By mouth Every day    levothyroxine (SYNTHROID) 75 MCG tablet Take 1 tablet (75 mcg total) by mouth after dinner.    levothyroxine (SYNTHROID) 75 MCG tablet Take 1 tablet (75 mcg total) by mouth after dinner.    lisinopril-hydrochlorothiazide (PRINZIDE,ZESTORETIC) 20-12.5 mg per tablet Take 1 tablet by mouth every morning.    multivitamin (THERAGRAN) per tablet Take by mouth. 1 Tablet Oral Every day    pravastatin (PRAVACHOL) 20 MG tablet take 1 tablet by mouth once daily    VITAMIN D2 50,000 unit capsule take 1 capsule by mouth every week    aspirin (ECOTRIN) 81 MG EC tablet Take 1 tablet (81 mg total) by mouth once daily.    pantoprazole (PROTONIX) 40 MG tablet Take 1 tablet (40 mg total) by mouth once daily.           Clinical Reference  "Information           Your Vitals Were     BP Pulse Height Weight BMI    154/83 66 5' 7" (1.702 m) 74.6 kg (164 lb 7.4 oz) 25.76 kg/m2      Blood Pressure          Most Recent Value    BP  (!)  154/83      Allergies as of 3/27/2017     Keflex  [Cephalexin]      Immunizations Administered on Date of Encounter - 3/27/2017     None      Orders Placed During Today's Visit     Future Labs/Procedures Expected by Expires    Methylmalonic acid, serum  3/27/2017 5/26/2018    TSH  3/27/2017 5/26/2018    Vitamin B12  3/27/2017 5/26/2018    EEG  As directed 3/27/2018      [facility] Sign-Up     Activating your [facility] account is as easy as 1-2-3!     1) Visit [email or web address], select Sign Up Now, enter this activation code and your date of birth, then select Next.  [email or web address]  Expires: 4/6/2017 12:44 PM      2) Create a username and password to use when you visit [facility] in the future and select a security question in case you lose your password and select Next.    3) Enter your e-mail address and click Sign Up!    Additional Information  If you have questions, please e-mail [email or web address] or call [phone] to talk to our [facility] staff. Remember, [facility] is NOT to be used for urgent needs. For medical emergencies, dial 911.         Language Assistance Services     ATTENTION: Language assistance services are available, free of charge. Please call [phone].      ATENCIÓN: Si habla español, tiene a mandel disposición servicios gratuitos de asistencia lingüística. Llame al [phone].     JESUS Ý: N?u b?n nói Ti?ng Vi?t, có các d?ch v? h? tr? ngôn ng? mi?n phí dành cho b?n. G?i s? [phone].         [facility] complies with applicable Federal civil rights laws and does not discriminate on the basis of race, color, national origin, age, disability, or sex.        "

## 2017-03-27 NOTE — LETTER
March 27, 2017      Pranay Brown MD  [address]  [facility] [address]           [facility] Neurology  [address]  [facility] [address]  Phone: [phone]  Fax: [phone]          Patient: Daniela Harris   MR Number: [id number]   YOB: 1940   Date of Visit: 3/27/2017       Dear Dr. Pranay Brown:    Thank you for referring Daniela Harris to me for evaluation. Attached you will find relevant portions of my assessment and plan of care.    If you have questions, please do not hesitate to call me. I look forward to following Daniela Harris along with you.    Sincerely,    Junior Mccann III, MD    Enclosure  CC:  No Recipients    If you would like to receive this communication electronically, please contact [email or web address] or [phone] to request more information on "[email or web address] Link access.    For providers and/or their staff who would like to refer a patient to [facility], please contact us through our one-stop-shop provider referral line, [facility] [place], at [phone].    If you feel you have received this communication in error or would no longer like to receive these types of communications, please e-mail [email or web address]

## 2017-03-28 LAB — T4 FREE SERPL-MCNC: 0.51 NG/DL

## 2017-03-28 RX ORDER — DONEPEZIL HYDROCHLORIDE 10 MG/1
10 TABLET, FILM COATED ORAL DAILY
Qty: 30 TABLET | Refills: 1 | Status: SHIPPED | OUTPATIENT
Start: 2017-03-28 | End: 2017-05-23

## 2017-03-30 LAB — METHYLMALONATE SERPL-SCNC: 0.17 UMOL/L

## 2017-03-31 DIAGNOSIS — I10 ESSENTIAL HYPERTENSION: Primary | ICD-10-CM

## 2017-03-31 RX ORDER — CLONIDINE HYDROCHLORIDE 0.1 MG/1
0.1 TABLET ORAL 2 TIMES DAILY
Qty: 60 TABLET | Refills: 11 | Status: SHIPPED | OUTPATIENT
Start: 2017-03-31 | End: 2018-05-09 | Stop reason: SDUPTHER

## 2017-04-27 ENCOUNTER — OFFICE VISIT (OUTPATIENT)
Dept: INTERNAL MEDICINE | Facility: CLINIC | Age: 77
End: 2017-04-27
Payer: MEDICARE

## 2017-04-27 ENCOUNTER — LAB VISIT (OUTPATIENT)
Dept: LAB | Facility: HOSPITAL | Age: 77
End: 2017-04-27
Attending: FAMILY MEDICINE
Payer: MEDICARE

## 2017-04-27 ENCOUNTER — OFFICE VISIT (OUTPATIENT)
Dept: NEPHROLOGY | Facility: CLINIC | Age: 77
End: 2017-04-27
Payer: MEDICARE

## 2017-04-27 VITALS
WEIGHT: 161.38 LBS | OXYGEN SATURATION: 95 % | HEART RATE: 61 BPM | SYSTOLIC BLOOD PRESSURE: 152 MMHG | DIASTOLIC BLOOD PRESSURE: 78 MMHG | HEIGHT: 67 IN | BODY MASS INDEX: 25.33 KG/M2

## 2017-04-27 VITALS — DIASTOLIC BLOOD PRESSURE: 72 MMHG | SYSTOLIC BLOOD PRESSURE: 138 MMHG | HEART RATE: 66 BPM

## 2017-04-27 DIAGNOSIS — E03.9 HYPOTHYROIDISM, UNSPECIFIED TYPE: ICD-10-CM

## 2017-04-27 DIAGNOSIS — I63.9 ACUTE ISCHEMIC STROKE: ICD-10-CM

## 2017-04-27 DIAGNOSIS — I10 HTN (HYPERTENSION), BENIGN: ICD-10-CM

## 2017-04-27 DIAGNOSIS — D50.0 IRON DEFICIENCY ANEMIA DUE TO CHRONIC BLOOD LOSS: ICD-10-CM

## 2017-04-27 DIAGNOSIS — N18.9 CRF (CHRONIC RENAL FAILURE), UNSPECIFIED STAGE: ICD-10-CM

## 2017-04-27 DIAGNOSIS — N18.30 KIDNEY DISEASE, CHRONIC, STAGE III (GFR 30-59 ML/MIN): ICD-10-CM

## 2017-04-27 DIAGNOSIS — K25.9 GASTRIC ULCER, UNSPECIFIED CHRONICITY, UNSPECIFIED WHETHER GASTRIC ULCER HEMORRHAGE OR PERFORATION PRESENT: ICD-10-CM

## 2017-04-27 DIAGNOSIS — I10 HTN (HYPERTENSION), BENIGN: Primary | ICD-10-CM

## 2017-04-27 DIAGNOSIS — E78.5 HYPERLIPIDEMIA, UNSPECIFIED HYPERLIPIDEMIA TYPE: ICD-10-CM

## 2017-04-27 LAB
ALBUMIN SERPL BCP-MCNC: 4 G/DL
ALP SERPL-CCNC: 56 U/L
ALT SERPL W/O P-5'-P-CCNC: 20 U/L
ANION GAP SERPL CALC-SCNC: 15 MMOL/L
AST SERPL-CCNC: 26 U/L
BASOPHILS # BLD AUTO: 0.03 K/UL
BASOPHILS NFR BLD: 0.4 %
BILIRUB SERPL-MCNC: 0.8 MG/DL
BUN SERPL-MCNC: 18 MG/DL
CALCIUM SERPL-MCNC: 9.4 MG/DL
CHLORIDE SERPL-SCNC: 102 MMOL/L
CHOLEST/HDLC SERPL: 5.3 {RATIO}
CO2 SERPL-SCNC: 22 MMOL/L
CREAT SERPL-MCNC: 1.4 MG/DL
DIFFERENTIAL METHOD: NORMAL
EOSINOPHIL # BLD AUTO: 0.1 K/UL
EOSINOPHIL NFR BLD: 1.2 %
ERYTHROCYTE [DISTWIDTH] IN BLOOD BY AUTOMATED COUNT: 13.8 %
EST. GFR  (AFRICAN AMERICAN): 41.2 ML/MIN/1.73 M^2
EST. GFR  (NON AFRICAN AMERICAN): 35.8 ML/MIN/1.73 M^2
GLUCOSE SERPL-MCNC: 82 MG/DL
HCT VFR BLD AUTO: 40.2 %
HDL/CHOLESTEROL RATIO: 18.7 %
HDLC SERPL-MCNC: 315 MG/DL
HDLC SERPL-MCNC: 59 MG/DL
HGB BLD-MCNC: 13.3 G/DL
LDLC SERPL CALC-MCNC: 219.4 MG/DL
LYMPHOCYTES # BLD AUTO: 1.5 K/UL
LYMPHOCYTES NFR BLD: 21.2 %
MCH RBC QN AUTO: 31 PG
MCHC RBC AUTO-ENTMCNC: 33.1 %
MCV RBC AUTO: 94 FL
MONOCYTES # BLD AUTO: 0.6 K/UL
MONOCYTES NFR BLD: 7.7 %
NEUTROPHILS # BLD AUTO: 5 K/UL
NEUTROPHILS NFR BLD: 69.2 %
NONHDLC SERPL-MCNC: 256 MG/DL
PLATELET # BLD AUTO: 224 K/UL
PMV BLD AUTO: 11.4 FL
POTASSIUM SERPL-SCNC: 5.2 MMOL/L
PROT SERPL-MCNC: 7.9 G/DL
RBC # BLD AUTO: 4.29 M/UL
SODIUM SERPL-SCNC: 139 MMOL/L
T4 FREE SERPL-MCNC: 0.56 NG/DL
TRIGL SERPL-MCNC: 183 MG/DL
TSH SERPL DL<=0.005 MIU/L-ACNC: 64.34 UIU/ML
WBC # BLD AUTO: 7.26 K/UL

## 2017-04-27 PROCEDURE — 99203 OFFICE O/P NEW LOW 30 MIN: CPT | Mod: S$PBB,GC,, | Performed by: INTERNAL MEDICINE

## 2017-04-27 PROCEDURE — 99214 OFFICE O/P EST MOD 30 MIN: CPT | Mod: PBBFAC,27 | Performed by: INTERNAL MEDICINE

## 2017-04-27 PROCEDURE — 99999 PR PBB SHADOW E&M-EST. PATIENT-LVL III: CPT | Mod: PBBFAC,,, | Performed by: FAMILY MEDICINE

## 2017-04-27 PROCEDURE — 99215 OFFICE O/P EST HI 40 MIN: CPT | Mod: S$PBB,,, | Performed by: FAMILY MEDICINE

## 2017-04-27 PROCEDURE — 99999 PR PBB SHADOW E&M-EST. PATIENT-LVL IV: CPT | Mod: PBBFAC,GC,, | Performed by: INTERNAL MEDICINE

## 2017-04-27 RX ORDER — LISINOPRIL AND HYDROCHLOROTHIAZIDE 12.5; 2 MG/1; MG/1
1 TABLET ORAL EVERY MORNING
Qty: 30 TABLET | Refills: 6 | Status: CANCELLED | OUTPATIENT
Start: 2017-04-27

## 2017-04-27 RX ORDER — LISINOPRIL AND HYDROCHLOROTHIAZIDE 20; 25 MG/1; MG/1
1 TABLET ORAL DAILY
Qty: 90 TABLET | Refills: 3 | Status: SHIPPED | OUTPATIENT
Start: 2017-04-27 | End: 2017-05-04 | Stop reason: SDUPTHER

## 2017-04-27 RX ORDER — ATORVASTATIN CALCIUM 40 MG/1
40 TABLET, FILM COATED ORAL DAILY
Qty: 90 TABLET | Refills: 3 | Status: SHIPPED | OUTPATIENT
Start: 2017-04-27 | End: 2017-05-01 | Stop reason: SDUPTHER

## 2017-04-27 NOTE — MR AVS SNAPSHOT
[facility]  [address]  [facility] [address]  Phone: [phone]  Fax: [phone]                  Daniela Harris   2017 1:00 PM   Office Visit    Description:  Female : 1940   Provider:  Pranay Brown MD   Department:  [facility]           Diagnoses this Visit        Comments    HTN (hypertension), benign    -  Primary     Hyperlipidemia, unspecified hyperlipidemia type         Acute ischemic stroke         Gastric ulcer, unspecified chronicity, unspecified whether gastric ulcer hemorrhage or perforation present         CRF (chronic renal failure), unspecified stage         Hypothyroidism, unspecified type                To Do List           Future Appointments        Provider Department Dept Phone    2017 1:00 PM Pranay Brown MD [facility] [phone]    2017 2:20 PM Pranay Brown MD [facility] [phone]    2017 3:00 PM Junior Mccann III, MD [facility] [phone]      Goals (5 Years of Data)     None      Follow-Up and Disposition     Return in about 3 months (around 2017), or if symptoms worsen or fail to improve.    Follow-up and Disposition History       These Medications        Disp Refills Start End    atorvastatin (LIPITOR) 40 MG tablet 90 tablet 3 2017    Take 1 tablet (40 mg total) by mouth once daily. - Oral    Pharmacy: [address] [address] [facility] Ph #: [phone]         [facility]     [facility] Nurse Care [facility] -  Assistance  Unless otherwise directed by your provider, please contact [facility], our nurse care line that is available for  assistance.     Registered nurses in the [facility] provide: appointment scheduling, clinical advisement, health education, and other advisory services.  Call: [phone] (toll free)               Medications           Message regarding  Medications     Verify the changes and/or additions to your medication regime listed below are the same as discussed with your clinician today.  If any of these changes or additions are incorrect, please notify your healthcare provider.        START taking these NEW medications        Refills    atorvastatin (LIPITOR) 40 MG tablet 3    Sig: Take 1 tablet (40 mg total) by mouth once daily.    Class: Normal    Route: Oral      STOP taking these medications     pravastatin (PRAVACHOL) 20 MG tablet take 1 tablet by mouth once daily           Verify that the below list of medications is an accurate representation of the medications you are currently taking.  If none reported, the list may be blank. If incorrect, please contact your healthcare provider. Carry this list with you in case of emergency.           Current Medications     amlodipine (NORVASC) 10 MG tablet Take 1 tablet (10 mg total) by mouth once daily.    ascorbic acid (VITAMIN C) 500 MG tablet Take 500 mg by mouth. 1 Tablet Oral Every day    aspirin (ECOTRIN) 81 MG EC tablet Take 1 tablet (81 mg total) by mouth once daily.    atorvastatin (LIPITOR) 40 MG tablet Take 1 tablet (40 mg total) by mouth once daily.    b complex vitamins (VITAMINS B COMPLEX) tablet Take 1 tablet by mouth once daily. 1 Tablet Oral Every day    CALCIUM CARBONATE (CALCIUM 600 ORAL) Take 600 mg by mouth. 1  By mouth Every day    cloNIDine (CATAPRES) 0.1 MG tablet Take 1 tablet (0.1 mg total) by mouth 2 (two) times daily.    donepezil (ARICEPT) 10 MG tablet Take 1 tablet (10 mg total) by mouth once daily.    ferrous gluconate (FERGON) 324 MG tablet Take 1 tablet (324 mg total) by mouth 2 (two) times daily with meals.    glucosamine & chondroit sul.Na 750-400 mg Cmpk Take by mouth. 1  By mouth Every day    levothyroxine (SYNTHROID) 75 MCG tablet Take 1 tablet (75 mcg total) by mouth after dinner.    levothyroxine (SYNTHROID) 75 MCG tablet Take 1 tablet (75 mcg total) by mouth after  dinner.    lisinopril-hydrochlorothiazide (PRINZIDE,ZESTORETIC) 20-12.5 mg per tablet Take 1 tablet by mouth every morning.    multivitamin (THERAGRAN) per tablet Take by mouth. 1 Tablet Oral Every day    pantoprazole (PROTONIX) 40 MG tablet Take 1 tablet (40 mg total) by mouth once daily.    VITAMIN D2 50,000 unit capsule take 1 capsule by mouth every week           Clinical Reference Information           Your Vitals Were     BP Pulse                138/72 66          Blood Pressure          Most Recent Value    BP  138/72      Allergies as of 4/27/2017     Keflex  [Cephalexin]      Immunizations Administered on Date of Encounter - 4/27/2017     None      Orders Placed During Today's Visit      Normal Orders This Visit    Ambulatory Referral to Nephrology     Future Labs/Procedures Expected by Expires    CBC auto differential  4/27/2017 4/27/2018    Comprehensive metabolic panel  4/27/2017 4/27/2018    Lipid panel  4/27/2017 4/27/2018    TSH  4/27/2017 7/26/2017      [facility] Sign-Up     Activating your [facility] account is as easy as 1-2-3!     1) Visit [email or web address], select Sign Up Now, enter this activation code and your date of birth, then select Next.  [email or web address]  Expires: 6/11/2017 12:26 PM      2) Create a username and password to use when you visit [facility] in the future and select a security question in case you lose your password and select Next.    3) Enter your e-mail address and click Sign Up!    Additional Information  If you have questions, please e-mail [email or web address] or call [phone] to talk to our [facility] staff. Remember, [facility] is NOT to be used for urgent needs. For medical emergencies, dial 911.         Language Assistance Services     ATTENTION: Language assistance services are available, free of charge. Please call [phone].      ATENCIÓN: Si habla español, tiene a mandel disposición servicios gratuitos de asistencia lingüística. Llame al [phone].     [facility]  Ý: N?u b?n nói Ti?ng Vi?t, có các d?ch v? h? tr? ngôn ng? mi?n phí dành cho b?n. G?i s? [phone].         [facility] complies with applicable Federal civil rights laws and does not discriminate on the basis of race, color, national origin, age, disability, or sex.

## 2017-04-28 ENCOUNTER — TELEPHONE (OUTPATIENT)
Dept: INTERNAL MEDICINE | Facility: CLINIC | Age: 77
End: 2017-04-28

## 2017-04-30 NOTE — PROGRESS NOTES
[facility]    The note from the fellow/resident was reviewed. I have personally interviewed and examined the patient. There were no additional findings with regards to the history or physical exam.    I agree with the assessment and plan of  Joshua Littlejohn

## 2017-05-01 ENCOUNTER — OFFICE VISIT (OUTPATIENT)
Dept: INTERNAL MEDICINE | Facility: CLINIC | Age: 77
End: 2017-05-01
Payer: MEDICARE

## 2017-05-01 VITALS
BODY MASS INDEX: 25.33 KG/M2 | HEIGHT: 67 IN | WEIGHT: 161.38 LBS | DIASTOLIC BLOOD PRESSURE: 80 MMHG | SYSTOLIC BLOOD PRESSURE: 142 MMHG

## 2017-05-01 DIAGNOSIS — N18.30 KIDNEY DISEASE, CHRONIC, STAGE III (GFR 30-59 ML/MIN): ICD-10-CM

## 2017-05-01 DIAGNOSIS — E78.5 HYPERLIPIDEMIA, UNSPECIFIED HYPERLIPIDEMIA TYPE: ICD-10-CM

## 2017-05-01 DIAGNOSIS — I63.9 ACUTE ISCHEMIC STROKE: ICD-10-CM

## 2017-05-01 DIAGNOSIS — I10 HTN (HYPERTENSION), BENIGN: Primary | ICD-10-CM

## 2017-05-01 DIAGNOSIS — E89.0 POSTSURGICAL HYPOTHYROIDISM: ICD-10-CM

## 2017-05-01 PROCEDURE — 99213 OFFICE O/P EST LOW 20 MIN: CPT | Mod: PBBFAC | Performed by: FAMILY MEDICINE

## 2017-05-01 PROCEDURE — 99215 OFFICE O/P EST HI 40 MIN: CPT | Mod: S$PBB,,, | Performed by: FAMILY MEDICINE

## 2017-05-01 PROCEDURE — 99999 PR PBB SHADOW E&M-EST. PATIENT-LVL III: CPT | Mod: PBBFAC,,, | Performed by: FAMILY MEDICINE

## 2017-05-01 RX ORDER — ATORVASTATIN CALCIUM 80 MG/1
80 TABLET, FILM COATED ORAL DAILY
Qty: 90 TABLET | Refills: 3 | Status: SHIPPED | OUTPATIENT
Start: 2017-05-01 | End: 2018-05-02 | Stop reason: SDUPTHER

## 2017-05-01 NOTE — PROGRESS NOTES
Subjective:       Patient ID: Daniela Harris is a 77 y.o. female.    Chief Complaint: Follow-up  Dainela Harris 77 y.o. female is here for office visit to review care and physical exam, here for f/u appt CRF with GFR down.  Has high LDL and very high TSH, likely noncompliant with meds.  Has a first cousin that has HH co in [facility].      HPI  Review of Systems   Constitutional: Negative for activity change, fatigue, fever and unexpected weight change.   HENT: Negative for congestion, hearing loss, postnasal drip and rhinorrhea.    Eyes: Negative for redness and visual disturbance.   Respiratory: Negative for chest tightness, shortness of breath and wheezing.    Cardiovascular: Negative for chest pain, palpitations and leg swelling.   Gastrointestinal: Negative for abdominal distention.   Genitourinary: Negative for decreased urine volume, dysuria, flank pain, hematuria, pelvic pain and urgency.   Musculoskeletal: Negative for back pain, gait problem, joint swelling and neck stiffness.   Skin: Negative for color change, rash and wound.   Neurological: Negative for dizziness, syncope, weakness and headaches.   Psychiatric/Behavioral: Negative for behavioral problems, confusion and sleep disturbance. The patient is not nervous/anxious.        Objective:      Physical Exam   Constitutional: She is oriented to person, place, and time. She appears well-developed and well-nourished. No distress.   HENT:   Head: Normocephalic.   Mouth/Throat: No oropharyngeal exudate.   Eyes: EOM are normal. Pupils are equal, round, and reactive to light. No scleral icterus.   Neck: Neck supple. No JVD present. No thyromegaly present.   Cardiovascular: Normal rate, regular rhythm and normal heart sounds.  Exam reveals no gallop and no friction rub.    No murmur heard.  Pulmonary/Chest: Effort normal and breath sounds normal. She has no wheezes. She has no rales.   Abdominal: Soft. Bowel sounds are normal. She exhibits  no distension and no mass. There is no tenderness. There is no guarding.   Musculoskeletal: Normal range of motion. She exhibits no edema.   Lymphadenopathy:     She has no cervical adenopathy.   Neurological: She is alert and oriented to person, place, and time. She has normal reflexes. She displays normal reflexes. No cranial nerve deficit. She exhibits normal muscle tone.   Skin: Skin is warm. No rash noted. No erythema.   Psychiatric: She has a normal mood and affect. Thought content normal.       Assessment:       No diagnosis found.    Plan:       Daniela was seen today for follow-up.    Diagnoses and all orders for this visit:    HTN (hypertension), benign  - follow, has Nephrology following, will get HH to follow, watch for complaince  Kidney disease, chronic, stage III (GFR 30-59 ml/min)  - Getting evaluated  Hyperlipidemia, unspecified hyperlipidemia type  - Compliance needed, will re-check three mo  Postsurgical hypothyroidism  - discussed, needs HH to enforce complasinc e, re-check three mo  Acute ischemic stroke  - Actually, no new cocnerns

## 2017-05-01 NOTE — MR AVS SNAPSHOT
[facility]  [address]  [facility] [address]  Phone: [phone]  Fax: [phone]                  Daniela Harris   2017 2:00 PM   Office Visit    Description:  Female : 1940   Provider:  Pranay Brown MD   Department:  [facility]           Reason for Visit     Follow-up           Diagnoses this Visit        Comments    HTN (hypertension), benign    -  Primary     Kidney disease, chronic, stage III (GFR 30-59 ml/min)         Hyperlipidemia, unspecified hyperlipidemia type         Postsurgical hypothyroidism         Acute ischemic stroke                To Do List           Future Appointments        Provider Department Dept Phone    2017 2:00 PM Pranay Brown MD [facility] [phone]    2017 12:35 PM LAB, APPOINTMENT [place] [facility] [phone]    2017 12:40 PM SPECIMEN, [facility] [phone]    2017 1:15 PM [facility] [phone]    2017 2:20 PM Pranay Brown MD [facility] [phone]      Goals (5 Years of Data)     None      Follow-Up and Disposition     Return in about 3 months (around 2017).    Follow-up and Disposition History       These Medications        Disp Refills Start End    atorvastatin (LIPITOR) 80 MG tablet 90 tablet 3 2017    Take 1 tablet (80 mg total) by mouth once daily. - Oral    [facility] [address] Ph #: [phone]         [facility]     [facility] On [facility] Nurse Care Line -  Assistance  Unless otherwise directed by your provider, please contact [facility], our nurse care line that is available for  assistance.     Registered nurses in the [facility] provide: appointment scheduling, clinical advisement, health education, and other advisory services.  Call:  [phone] (toll free)               Medications           Message regarding Medications     Verify the changes and/or additions to your medication regime listed below are the same as discussed with your clinician today.  If any of these changes or additions are incorrect, please notify your healthcare provider.        CHANGE how you are taking these medications     Start Taking Instead of    atorvastatin (LIPITOR) 80 MG tablet atorvastatin (LIPITOR) 40 MG tablet    Dosage:  Take 1 tablet (80 mg total) by mouth once daily. Dosage:  Take 1 tablet (40 mg total) by mouth once daily.    Reason for Change:  Reorder            Verify that the below list of medications is an accurate representation of the medications you are currently taking.  If none reported, the list may be blank. If incorrect, please contact your healthcare provider. Carry this list with you in case of emergency.           Current Medications     amlodipine (NORVASC) 10 MG tablet Take 1 tablet (10 mg total) by mouth once daily.    ascorbic acid (VITAMIN C) 500 MG tablet Take 500 mg by mouth. 1 Tablet Oral Every day    atorvastatin (LIPITOR) 80 MG tablet Take 1 tablet (80 mg total) by mouth once daily.    b complex vitamins (VITAMINS B COMPLEX) tablet Take 1 tablet by mouth once daily. 1 Tablet Oral Every day    CALCIUM CARBONATE (CALCIUM 600 ORAL) Take 600 mg by mouth. 1  By mouth Every day    cloNIDine (CATAPRES) 0.1 MG tablet Take 1 tablet (0.1 mg total) by mouth 2 (two) times daily.    donepezil (ARICEPT) 10 MG tablet Take 1 tablet (10 mg total) by mouth once daily.    ferrous gluconate (FERGON) 324 MG tablet Take 1 tablet (324 mg total) by mouth 2 (two) times daily with meals.    glucosamine & chondroit sul.Na 750-400 mg Cmpk Take by mouth. 1  By mouth Every day    levothyroxine (SYNTHROID) 75 MCG tablet Take 1 tablet (75 mcg total) by mouth after dinner.    levothyroxine (SYNTHROID) 75 MCG tablet Take 1 tablet (75 mcg total) by mouth after  "dinner.    lisinopril-hydrochlorothiazide (PRINZIDE,ZESTORETIC) 20-25 mg Tab Take 1 tablet by mouth once daily.    multivitamin (THERAGRAN) per tablet Take by mouth. 1 Tablet Oral Every day    VITAMIN D2 50,000 unit capsule take 1 capsule by mouth every week    aspirin (ECOTRIN) 81 MG EC tablet Take 1 tablet (81 mg total) by mouth once daily.    pantoprazole (PROTONIX) 40 MG tablet Take 1 tablet (40 mg total) by mouth once daily.           Clinical Reference Information           Your Vitals Were     BP Height Weight BMI       142/80 5' 7" (1.702 m) 73.2 kg (161 lb 6 oz) 25.28 kg/m2       Blood Pressure          Most Recent Value    BP  (!)  142/80      Allergies as of 5/1/2017     Keflex  [Cephalexin]      Immunizations Administered on Date of Encounter - 5/1/2017     None      [facility] Sign-Up     Activating your [facility] account is as easy as 1-2-3!     1) Visit [email or web address], select Sign Up Now, enter this activation code and your date of birth, then select Next.  [email or web address]  Expires: 6/11/2017 12:26 PM      2) Create a username and password to use when you visit [facility] in the future and select a security question in case you lose your password and select Next.    3) Enter your e-mail address and click Sign Up!    Additional Information  If you have questions, please e-mail [email or web address] or call [phone] to talk to our [facility] staff. Remember, [facility] is NOT to be used for urgent needs. For medical emergencies, dial 911.         Language Assistance Services     ATTENTION: Language assistance services are available, free of charge. Please call [phone].      ATENCIÓN: Si deltala leonides, tiene a mandel disposición servicios gratuitos de asistencia lingüística. Llame al [phone].     CHÚ Ý: N?u b?n nói Ti?ng Vi?t, có các d?ch v? h? tr? ngôn ng? mi?n phí dành cho b?n. G?i s? [phone].         [facility] complies with applicable Federal civil rights laws and " does not discriminate on the basis of race, color, national origin, age, disability, or sex.

## 2017-05-04 NOTE — TELEPHONE ENCOUNTER
----- Message from Tripp Auguste sent at 5/4/2017  2:19 PM CDT -----  Contact: Judit Milian Jackie [phone]  Type: Rx    Name of mediationc levothyroxine (SYNTHROID) 75 MCG tablet,,,(s): amlodipine (NORVASC) 10 MG tablet,,,ferrous gluconate (FERGON) 324 MG tablet,,,,pantoprazole (PROTONIX) 40 MG tablet,,,,lisinopril-hydrochlorothiazide (PRINZIDE,ZESTORETIC) 20-25 mg     Is this a refill? New rx? Refill    Who prescribed medication? Dr Brown    Pharmacy Name, Phone, & Location: [facility]    Comments:Advice      Thanks

## 2017-05-05 RX ORDER — PANTOPRAZOLE SODIUM 40 MG/1
40 TABLET, DELAYED RELEASE ORAL DAILY
Qty: 30 TABLET | Refills: 11 | Status: SHIPPED | OUTPATIENT
Start: 2017-05-05 | End: 2018-05-09 | Stop reason: SDUPTHER

## 2017-05-05 RX ORDER — AMLODIPINE BESYLATE 10 MG/1
10 TABLET ORAL DAILY
Qty: 30 TABLET | Refills: 6 | Status: SHIPPED | OUTPATIENT
Start: 2017-05-05 | End: 2018-05-16 | Stop reason: SDUPTHER

## 2017-05-05 RX ORDER — FERROUS GLUCONATE 324(38)MG
324 TABLET ORAL 2 TIMES DAILY WITH MEALS
Qty: 60 TABLET | Refills: 3 | Status: SHIPPED | OUTPATIENT
Start: 2017-05-05 | End: 2018-03-27 | Stop reason: SDUPTHER

## 2017-05-05 RX ORDER — LISINOPRIL AND HYDROCHLOROTHIAZIDE 20; 25 MG/1; MG/1
1 TABLET ORAL DAILY
Qty: 90 TABLET | Refills: 3 | Status: SHIPPED | OUTPATIENT
Start: 2017-05-05 | End: 2018-08-27

## 2017-05-05 RX ORDER — LEVOTHYROXINE SODIUM 75 UG/1
75 TABLET ORAL
Qty: 30 TABLET | Refills: 11 | Status: SHIPPED | OUTPATIENT
Start: 2017-05-05 | End: 2018-10-25 | Stop reason: SDUPTHER

## 2017-05-11 ENCOUNTER — HOSPITAL ENCOUNTER (OUTPATIENT)
Dept: RADIOLOGY | Facility: HOSPITAL | Age: 77
Discharge: HOME | End: 2017-05-11
Attending: INTERNAL MEDICINE
Payer: MEDICARE

## 2017-05-11 DIAGNOSIS — I10 HTN (HYPERTENSION), BENIGN: ICD-10-CM

## 2017-05-11 DIAGNOSIS — N18.30 KIDNEY DISEASE, CHRONIC, STAGE III (GFR 30-59 ML/MIN): ICD-10-CM

## 2017-05-11 PROCEDURE — 76770 US EXAM ABDO BACK WALL COMP: CPT | Mod: 26,59,GC, | Performed by: RADIOLOGY

## 2017-05-11 PROCEDURE — 76770 US EXAM ABDO BACK WALL COMP: CPT | Mod: TC

## 2017-05-11 PROCEDURE — 93975 VASCULAR STUDY: CPT | Mod: 26,GC,, | Performed by: RADIOLOGY

## 2017-05-23 ENCOUNTER — OFFICE VISIT (OUTPATIENT)
Dept: INTERNAL MEDICINE | Facility: CLINIC | Age: 77
End: 2017-05-23
Payer: MEDICARE

## 2017-05-23 ENCOUNTER — OFFICE VISIT (OUTPATIENT)
Dept: NEUROLOGY | Facility: CLINIC | Age: 77
End: 2017-05-23
Payer: MEDICARE

## 2017-05-23 VITALS — HEART RATE: 66 BPM | DIASTOLIC BLOOD PRESSURE: 60 MMHG | SYSTOLIC BLOOD PRESSURE: 108 MMHG

## 2017-05-23 VITALS
HEIGHT: 66 IN | WEIGHT: 158.5 LBS | BODY MASS INDEX: 25.47 KG/M2 | HEART RATE: 60 BPM | SYSTOLIC BLOOD PRESSURE: 123 MMHG | DIASTOLIC BLOOD PRESSURE: 69 MMHG

## 2017-05-23 DIAGNOSIS — E78.5 HYPERLIPIDEMIA, UNSPECIFIED HYPERLIPIDEMIA TYPE: ICD-10-CM

## 2017-05-23 DIAGNOSIS — I10 HTN (HYPERTENSION), BENIGN: Primary | ICD-10-CM

## 2017-05-23 DIAGNOSIS — I10 HTN (HYPERTENSION), BENIGN: ICD-10-CM

## 2017-05-23 DIAGNOSIS — I67.2 CEREBRAL ATHEROSCLEROSIS: ICD-10-CM

## 2017-05-23 DIAGNOSIS — E03.9 HYPOTHYROIDISM, UNSPECIFIED TYPE: ICD-10-CM

## 2017-05-23 DIAGNOSIS — F02.80 LATE ONSET ALZHEIMER'S DISEASE WITHOUT BEHAVIORAL DISTURBANCE: Primary | ICD-10-CM

## 2017-05-23 DIAGNOSIS — G30.1 LATE ONSET ALZHEIMER'S DISEASE WITHOUT BEHAVIORAL DISTURBANCE: Primary | ICD-10-CM

## 2017-05-23 PROCEDURE — 99213 OFFICE O/P EST LOW 20 MIN: CPT | Mod: PBBFAC,27

## 2017-05-23 PROCEDURE — 99999 PR PBB SHADOW E&M-EST. PATIENT-LVL III: CPT | Mod: PBBFAC,,,

## 2017-05-23 PROCEDURE — 99214 OFFICE O/P EST MOD 30 MIN: CPT | Mod: S$PBB,,,

## 2017-05-23 PROCEDURE — 99215 OFFICE O/P EST HI 40 MIN: CPT | Mod: S$PBB,,, | Performed by: FAMILY MEDICINE

## 2017-05-23 PROCEDURE — 99999 PR PBB SHADOW E&M-EST. PATIENT-LVL II: CPT | Mod: PBBFAC,,, | Performed by: FAMILY MEDICINE

## 2017-05-23 RX ORDER — DONEPEZIL HYDROCHLORIDE 5 MG/1
5 TABLET, FILM COATED ORAL NIGHTLY
Qty: 30 TABLET | Refills: 2 | Status: SHIPPED | OUTPATIENT
Start: 2017-05-23 | End: 2017-08-31

## 2017-05-23 RX ORDER — MEMANTINE HYDROCHLORIDE 5 MG/1
TABLET ORAL
Qty: 60 TABLET | Refills: 2 | Status: SHIPPED | OUTPATIENT
Start: 2017-05-23 | End: 2017-08-31 | Stop reason: SDUPTHER

## 2017-05-23 NOTE — PROGRESS NOTES
This office note has been dictated.  HISTORY OF PRESENT ILLNESS:  Daniela Harris returns to [facility]rology [facility] for   medical management of Alzheimer's and cerebrovascular dementia.  When I saw her   last, I increased her Aricept to 10 mg tablet.  This was very poorly tolerated   and she had to discontinue it almost immediately.  Her TSH levels are still   elevated and this is being followed by her primary care, Dr. Brown.  She   recently started taking Ensure for additional nutrition and likes it.    NEUROLOGICAL REVIEW OF SYSTEMS:  Other than the GI upset from the Aricept 10 mg,   she denies fever, chills, sweats, dizziness, hearing loss, headaches, vision   problems, speech or swallowing difficulty, chest pain, cough, shortness of   breath and incontinence.    MEDICATIONS:  I reviewed her list of medicines and edited the electronic record.    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    PHYSICAL EXAMINATION:  GENERAL:  She is alert and attentive.  Her speech is appropriate and well   articulated, but perhaps a bit perseverative as well.  VITAL SIGNS:  Reviewed and included in this note.  She is oriented to the day of   the week, but not to the month or to the year.  She is neatly dressed and in no   acute distress.  NEUROLOGIC:  Cranial nerve examination reveals postoperative pupils and   otherwise normal functioning of nerves II-XII, which are serially examined.  The   neck is supple, the pulses equal and no bruits are heard.  There is no focal   motor weakness, tremor, ataxia or dysmetria.  She has normal muscle tone and   there are no gross sensory deficits.  The deep tendon reflexes are symmetrical.    There are no frontal lobe release signs on today's exam, however.    I reviewed her clinic record and discussed the situation with her and her    in detail.  Since she had a bad reaction to the 10 mg Aricept, I will   have her resume the 5 mg, which she was on previously.  I am also  going to   initiate Namenda starting with the 5 mg tablet once a day for one week and then   twice a day thereafter.  I did discuss the potential risks, benefits including   side effects.  If she has any problem taking the medication, she should   discontinue it immediately and notify me of the situation.  Her next scheduled   appointment with me is in three months.  She may call if there are any questions   in the interim.      CHARLIE/GHAZAL  dd: 05/23/2017 16:40:06 (CDT)  td: 05/25/2017 01:28:23 (CDT)  Doc ID   #[id number]  Job ID #[id number]    CC:

## 2017-05-23 NOTE — LETTER
May 23, 2017      Pranay Brown MD  [address]  [facility] [address]           [facility]  [address]  [facility] [address]  Phone: [phone]  Fax: [phone]          Patient: Daniela Harris   MR Number: [id number]   YOB: 1940   Date of Visit: 5/23/2017       Dear Dr. Pranay Brown:    Thank you for referring Daniela Harris to me for evaluation. Attached you will find relevant portions of my assessment and plan of care.    If you have questions, please do not hesitate to call me. I look forward to following Daniela Harris along with you.    Sincerely,    Junior Mccann III, MD    Enclosure  CC:  No Recipients    If you would like to receive this communication electronically, please contact [email or web address] or [phone] to request more information on [employer] Link access.    For providers and/or their staff who would like to refer a patient to [facility], please contact us through our one-stop-shop provider referral line, [facility], at [phone].    If you feel you have received this communication in error or would no longer like to receive these types of communications, please e-mail [email or web address]

## 2017-05-23 NOTE — PROGRESS NOTES
Subjective:       Patient ID: Daniela Harris is a 77 y.o. female.    Chief Complaint: No chief complaint on file.   I have personally taken the history and examined Daniela Harris and agree with the findings, assessment and plan. Daniela Harris 77 y.o. female is here for office visit to review care and physical exam, follow-up HTN, med compliance, review CRF with GFR down.  Had high LDL and very high TSH, was likely noncompliant with meds.  Has a first cousin that has HH co in [facility]. And has been getting HH.  Notes is getting help now, feeling well.  Getting BP checks.    HPI  Review of Systems   Constitutional: Negative for activity change, fatigue, fever and unexpected weight change.   HENT: Negative for congestion, hearing loss, postnasal drip and rhinorrhea.    Eyes: Negative for redness and visual disturbance.   Respiratory: Negative for chest tightness, shortness of breath and wheezing.    Cardiovascular: Negative for chest pain, palpitations and leg swelling.   Gastrointestinal: Negative for abdominal distention.   Genitourinary: Negative for decreased urine volume, dysuria, flank pain, hematuria, pelvic pain and urgency.   Musculoskeletal: Negative for back pain, gait problem, joint swelling and neck stiffness.   Skin: Negative for color change, rash and wound.   Neurological: Negative for dizziness, syncope, weakness and headaches.   Psychiatric/Behavioral: Negative for behavioral problems, confusion and sleep disturbance. The patient is not nervous/anxious.        Objective:      Physical Exam   Constitutional: She is oriented to person, place, and time. She appears well-developed and well-nourished. No distress.   HENT:   Head: Normocephalic.   Mouth/Throat: No oropharyngeal exudate.   Eyes: EOM are normal. Pupils are equal, round, and reactive to light. No scleral icterus.   Neck: Neck supple. No JVD present. No thyromegaly present.   Cardiovascular: Normal rate, regular rhythm  and normal heart sounds.  Exam reveals no gallop and no friction rub.    No murmur heard.  Pulmonary/Chest: Effort normal and breath sounds normal. She has no wheezes. She has no rales.   Abdominal: Soft. Bowel sounds are normal. She exhibits no distension and no mass. There is no tenderness. There is no guarding.   Musculoskeletal: Normal range of motion. She exhibits no edema.   Lymphadenopathy:     She has no cervical adenopathy.   Neurological: She is alert and oriented to person, place, and time. She has normal reflexes. She displays normal reflexes. No cranial nerve deficit. She exhibits normal muscle tone.   Skin: Skin is warm. No rash noted. No erythema.   Psychiatric: She has a normal mood and affect. Thought content normal.       Assessment:       No diagnosis found.    Plan:       Diagnoses and all orders for this visit:    HTN (hypertension), benign  -     Comprehensive metabolic panel; Future    Hyperlipidemia, unspecified hyperlipidemia type  -     Lipid panel; Future  -     Comprehensive metabolic panel; Future    Hypothyroidism, unspecified type  -     Hemoglobin A1c; Future  -     TSH; Future    Cerebral atherosclerosis    - sEEING nEURO TODAY

## 2017-06-01 ENCOUNTER — OFFICE VISIT (OUTPATIENT)
Dept: NEPHROLOGY | Facility: CLINIC | Age: 77
End: 2017-06-01
Payer: MEDICARE

## 2017-06-01 VITALS
HEIGHT: 66 IN | WEIGHT: 156.06 LBS | SYSTOLIC BLOOD PRESSURE: 120 MMHG | HEART RATE: 63 BPM | DIASTOLIC BLOOD PRESSURE: 60 MMHG | BODY MASS INDEX: 25.08 KG/M2 | OXYGEN SATURATION: 97 %

## 2017-06-01 DIAGNOSIS — F02.80 LATE ONSET ALZHEIMER'S DISEASE WITHOUT BEHAVIORAL DISTURBANCE: ICD-10-CM

## 2017-06-01 DIAGNOSIS — I10 HTN (HYPERTENSION), BENIGN: ICD-10-CM

## 2017-06-01 DIAGNOSIS — G30.1 LATE ONSET ALZHEIMER'S DISEASE WITHOUT BEHAVIORAL DISTURBANCE: ICD-10-CM

## 2017-06-01 DIAGNOSIS — N18.30 CHRONIC KIDNEY DISEASE, STAGE III (MODERATE): Primary | ICD-10-CM

## 2017-06-01 PROCEDURE — 99999 PR PBB SHADOW E&M-EST. PATIENT-LVL IV: CPT | Mod: PBBFAC,GC,, | Performed by: INTERNAL MEDICINE

## 2017-06-01 PROCEDURE — 99214 OFFICE O/P EST MOD 30 MIN: CPT | Mod: PBBFAC | Performed by: INTERNAL MEDICINE

## 2017-06-01 PROCEDURE — 99213 OFFICE O/P EST LOW 20 MIN: CPT | Mod: S$PBB,GC,, | Performed by: INTERNAL MEDICINE

## 2017-06-01 NOTE — PROGRESS NOTES
Subjective:       Patient ID: Daniela Harris is a 77 y.o. White female who presents for new evaluation of Chronic Kidney Disease and Hypertension    HPI   A 77 y.o. 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 Patient has longstanding history of HTN. No recent hospitalization. His BP has been better controlled lately. Patient denies any recent illness, nausea, vomiting, diarrhea, decreased urine output or any blood on urine. Denies any back pain or use of NSAID's.     Review of Systems    Constitutional: Negative for fever, appetite change and fatigue.  HENT: Negative for hearing loss, sore throat and mouth sores.  Eyes: Negative for photophobia, pain and visual disturbance.  Respiratory: Negative for cough, chest tightness, shortness of breath and wheezing.  Cardiovascular: Negative for chest pain, palpitations and leg swelling.  Gastrointestinal: Negative for nausea, vomiting, abdominal pain, diarrhea, constipation, blood in stool and abdominal distention.  Genitourinary: Negative for dysuria, urgency, frequency, hematuria, decreased urine volume, difficulty urinating  Musculoskeletal: Negative for back pain, joint swelling, arthralgias and gait problem.  Skin: Negative for pallor, rash and wound.  Neurological: Negative for dizziness, tremors, syncope, weakness, light-headedness and headaches.  Hematological: Negative for adenopathy. Does not bruise/bleed easily.  Psychiatric/Behavioral: Negative for confusion, sleep disturbance and dysphoric mood. The patient is not nervous/anxious.    Objective:       Vitals:    06/01/17 1555   BP: 120/60   Pulse: 63     Past Medical History:   Diagnosis Date    After-cataract, obscuring vision - Left Eye 2/28/2014    Arthritis     Diverticulitis     Goiter     MNG    History of appendectomy     Hyperlipidemia     following  diet    Hypothyroidism     s/p surgery    Osteopenia     Other and unspecified hyperlipidemia     Peptic ulcer     x 2    Psoriasis     no active illness for years    Stroke     Unspecified essential hypertension     Unspecified vitamin D deficiency     Vitamin B 12 deficiency      Past Surgical History:   Procedure Laterality Date    APPENDECTOMY      CATARACT EXTRACTION      ou    CATARACT EXTRACTION W/ INTRAOCULAR LENS  IMPLANT, BILATERAL      COLONOSCOPY      ESOPHAGOGASTRODUODENOSCOPY      THYROID SURGERY      Total    TONSILLECTOMY      TONSILLECTOMY, ADENOIDECTOMY      TUBAL LIGATION         Physical Exam    General: No acute distress, well groomed, alert and oriented x 3  HEENT: Normocephalic, atraumatic, EOM's intact bilaterally, external inspection of ears and nose normal, moist mucous membranes, no oral ulcerations/lesions  Neck: Supple, symmetrical, trachea midline, no thyromegaly, no JVD  Respiratory: Clear to auscultation bilaterally, respirations unlabored, no rales/rhonchi/wheezing  Cardiovacular: Regular rate and rhythm, S1, S2 normal, no murmurs, rubs or gallops  Gastrointestinal: Soft, non-tender, bowel sounds normal, no hepatosplenomegaly  Musculoskeletal: No knee or ankle joint tenderness or swelling.   Extremities: No clubbing or cyanosis of bilateral upper extremities; no lower extremity edema bilaterally, radial pulses 2+ bilaterally, symmetric  Skin: warm and dry; no rash on exposed skin  Neurologic: CN grossly intact      Assessment:       1. Chronic kidney disease, stage III (moderate)    2. HTN (hypertension), benign    3. Late onset Alzheimer's disease without behavioral disturbance        Plan:       1. CKD: A 77 years old women that comes to establish care due to CKD stage 3. Etiology of CKD likely from hypertensive nephrosclerosis. Blood pressure better controlled lately. Creatinine slightly worse since last visit. Likely due to the better control of BP levels. No  monoclonal bands seen on JOHNATHAN. Patient had a 2.4 x 2.0 x 2.0 cm echogenic lesion within the lower pole of the RIGHT kidney on US. 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 Patient was oriented about importance of hydration prior to the study.   Lab Results   Component Value Date    CREATININE 1.5 (H) 05/11/2017     Protein Creatinine Ratios: No proteinuria present. Already on ACE inhibitor.     Prot/Creat Ratio, Ur   Date Value Ref Range Status   05/11/2017 0.04 0.00 - 0.20 Final     ·   ·   Acid-Base: No major acidosis or electrolyte imbalances.   Lab Results   Component Value Date     05/11/2017    K 3.7 05/11/2017    CO2 29 05/11/2017     2. HTN: Blood pressures adequate. Will continue current regimen.     3. Renal osteodystrophy: Will order PTH and vitamin D levels for next visit.   Lab Results   Component Value Date    PTH 67 (H) 10/01/2008    CALCIUM 9.0 05/11/2017    PHOS 4.0 05/11/2017       4. Anemia: Adequate H/H. No supplementation needed.   Lab Results   Component Value Date    HGB 13.1 05/11/2017        5. DM:  Last HbA1C adequate.   Lab Results   Component Value Date    HGBA1C 5.5 02/20/2017     Plan:   - CBC  - RFP  - CT scan abominal/pelvic with IV contrast  - PTH   - Vitamin D  - UA  - Urine protein/ creatinine   - RTC in 3 months     Joshua Perkins   Nephrology fellow PGY- 5  [phone]

## 2017-06-02 NOTE — PROGRESS NOTES
I have reviewed and concur with the fellow's history, physical, assessment, and plan. I have personally interviewed and examined the patient in renal clinic.

## 2017-06-09 ENCOUNTER — TELEPHONE (OUTPATIENT)
Dept: RADIOLOGY | Facility: HOSPITAL | Age: 77
End: 2017-06-09

## 2017-06-12 ENCOUNTER — HOSPITAL ENCOUNTER (OUTPATIENT)
Dept: RADIOLOGY | Facility: HOSPITAL | Age: 77
Discharge: HOME | End: 2017-06-12
Attending: INTERNAL MEDICINE
Payer: MEDICARE

## 2017-06-12 DIAGNOSIS — N18.30 CHRONIC KIDNEY DISEASE, STAGE III (MODERATE): ICD-10-CM

## 2017-06-12 LAB
CREAT SERPL-MCNC: 1.2 MG/DL (ref 0.5–1.4)
SAMPLE: NORMAL

## 2017-06-12 PROCEDURE — 25500020 PHARM REV CODE 255: Performed by: INTERNAL MEDICINE

## 2017-06-12 PROCEDURE — 74177 CT ABD & PELVIS W/CONTRAST: CPT | Mod: TC

## 2017-06-12 PROCEDURE — 74177 CT ABD & PELVIS W/CONTRAST: CPT | Mod: 26,,, | Performed by: RADIOLOGY

## 2017-06-12 RX ADMIN — IOHEXOL 75 ML: 350 INJECTION, SOLUTION INTRAVENOUS at 05:06

## 2017-08-16 ENCOUNTER — LAB VISIT (OUTPATIENT)
Dept: LAB | Facility: HOSPITAL | Age: 77
End: 2017-08-16
Attending: FAMILY MEDICINE
Payer: MEDICARE

## 2017-08-16 DIAGNOSIS — E78.5 HYPERLIPIDEMIA, UNSPECIFIED HYPERLIPIDEMIA TYPE: ICD-10-CM

## 2017-08-16 DIAGNOSIS — N18.30 CHRONIC KIDNEY DISEASE, STAGE III (MODERATE): ICD-10-CM

## 2017-08-16 DIAGNOSIS — E03.9 HYPOTHYROIDISM, UNSPECIFIED TYPE: ICD-10-CM

## 2017-08-16 DIAGNOSIS — I10 HTN (HYPERTENSION), BENIGN: ICD-10-CM

## 2017-08-16 DIAGNOSIS — F02.80 LATE ONSET ALZHEIMER'S DISEASE WITHOUT BEHAVIORAL DISTURBANCE: ICD-10-CM

## 2017-08-16 DIAGNOSIS — G30.1 LATE ONSET ALZHEIMER'S DISEASE WITHOUT BEHAVIORAL DISTURBANCE: ICD-10-CM

## 2017-08-16 LAB
25(OH)D3+25(OH)D2 SERPL-MCNC: 44 NG/ML
ALBUMIN SERPL BCP-MCNC: 4.1 G/DL
ALBUMIN SERPL BCP-MCNC: 4.1 G/DL
ALP SERPL-CCNC: 52 U/L
ALT SERPL W/O P-5'-P-CCNC: 23 U/L
ANION GAP SERPL CALC-SCNC: 9 MMOL/L
ANION GAP SERPL CALC-SCNC: 9 MMOL/L
AST SERPL-CCNC: 23 U/L
BASOPHILS # BLD AUTO: 0.03 K/UL
BASOPHILS NFR BLD: 0.4 %
BILIRUB SERPL-MCNC: 0.8 MG/DL
BUN SERPL-MCNC: 25 MG/DL
BUN SERPL-MCNC: 25 MG/DL
CALCIUM SERPL-MCNC: 9.6 MG/DL
CALCIUM SERPL-MCNC: 9.6 MG/DL
CHLORIDE SERPL-SCNC: 104 MMOL/L
CHLORIDE SERPL-SCNC: 104 MMOL/L
CHOLEST/HDLC SERPL: 3.4 {RATIO}
CO2 SERPL-SCNC: 28 MMOL/L
CO2 SERPL-SCNC: 28 MMOL/L
CREAT SERPL-MCNC: 1.4 MG/DL
CREAT SERPL-MCNC: 1.4 MG/DL
DIFFERENTIAL METHOD: ABNORMAL
EOSINOPHIL # BLD AUTO: 0.2 K/UL
EOSINOPHIL NFR BLD: 3 %
ERYTHROCYTE [DISTWIDTH] IN BLOOD BY AUTOMATED COUNT: 13.6 %
EST. GFR  (AFRICAN AMERICAN): 41 ML/MIN/1.73 M^2
EST. GFR  (AFRICAN AMERICAN): 41 ML/MIN/1.73 M^2
EST. GFR  (NON AFRICAN AMERICAN): 36 ML/MIN/1.73 M^2
EST. GFR  (NON AFRICAN AMERICAN): 36 ML/MIN/1.73 M^2
GLUCOSE SERPL-MCNC: 95 MG/DL
GLUCOSE SERPL-MCNC: 95 MG/DL
HCT VFR BLD AUTO: 37.5 %
HDL/CHOLESTEROL RATIO: 29.2 %
HDLC SERPL-MCNC: 209 MG/DL
HDLC SERPL-MCNC: 61 MG/DL
HGB BLD-MCNC: 12.2 G/DL
LDLC SERPL CALC-MCNC: 121.4 MG/DL
LYMPHOCYTES # BLD AUTO: 1.7 K/UL
LYMPHOCYTES NFR BLD: 23.2 %
MCH RBC QN AUTO: 31.1 PG
MCHC RBC AUTO-ENTMCNC: 32.5 G/DL
MCV RBC AUTO: 96 FL
MONOCYTES # BLD AUTO: 0.6 K/UL
MONOCYTES NFR BLD: 7.8 %
NEUTROPHILS # BLD AUTO: 4.9 K/UL
NEUTROPHILS NFR BLD: 65.5 %
NONHDLC SERPL-MCNC: 148 MG/DL
PHOSPHATE SERPL-MCNC: 4.5 MG/DL
PLATELET # BLD AUTO: 222 K/UL
PMV BLD AUTO: 10.9 FL
POTASSIUM SERPL-SCNC: 4.5 MMOL/L
POTASSIUM SERPL-SCNC: 4.5 MMOL/L
PROT SERPL-MCNC: 7.8 G/DL
PTH-INTACT SERPL-MCNC: 30 PG/ML
RBC # BLD AUTO: 3.92 M/UL
SODIUM SERPL-SCNC: 141 MMOL/L
SODIUM SERPL-SCNC: 141 MMOL/L
T4 FREE SERPL-MCNC: 0.86 NG/DL
TRIGL SERPL-MCNC: 133 MG/DL
TSH SERPL DL<=0.005 MIU/L-ACNC: 25.68 UIU/ML
WBC # BLD AUTO: 7.45 K/UL

## 2017-08-16 PROCEDURE — 84439 ASSAY OF FREE THYROXINE: CPT

## 2017-08-16 PROCEDURE — 36415 COLL VENOUS BLD VENIPUNCTURE: CPT

## 2017-08-16 PROCEDURE — 84443 ASSAY THYROID STIM HORMONE: CPT

## 2017-08-16 PROCEDURE — 80053 COMPREHEN METABOLIC PANEL: CPT

## 2017-08-16 PROCEDURE — 83970 ASSAY OF PARATHORMONE: CPT

## 2017-08-16 PROCEDURE — 80061 LIPID PANEL: CPT

## 2017-08-16 PROCEDURE — 82306 VITAMIN D 25 HYDROXY: CPT

## 2017-08-16 PROCEDURE — 85025 COMPLETE CBC W/AUTO DIFF WBC: CPT

## 2017-08-16 PROCEDURE — 83036 HEMOGLOBIN GLYCOSYLATED A1C: CPT

## 2017-08-16 PROCEDURE — 84100 ASSAY OF PHOSPHORUS: CPT

## 2017-08-17 LAB
ESTIMATED AVG GLUCOSE: 105 MG/DL
HBA1C MFR BLD HPLC: 5.3 %

## 2017-08-23 ENCOUNTER — OFFICE VISIT (OUTPATIENT)
Dept: INTERNAL MEDICINE | Facility: CLINIC | Age: 77
End: 2017-08-23
Payer: MEDICARE

## 2017-08-23 VITALS — HEART RATE: 70 BPM | SYSTOLIC BLOOD PRESSURE: 122 MMHG | DIASTOLIC BLOOD PRESSURE: 75 MMHG

## 2017-08-23 DIAGNOSIS — E03.9 HYPOTHYROIDISM, UNSPECIFIED TYPE: ICD-10-CM

## 2017-08-23 DIAGNOSIS — I65.23 CAROTID ARTERY PLAQUE, BILATERAL: ICD-10-CM

## 2017-08-23 DIAGNOSIS — E78.5 HYPERLIPIDEMIA, UNSPECIFIED HYPERLIPIDEMIA TYPE: ICD-10-CM

## 2017-08-23 DIAGNOSIS — I10 HTN (HYPERTENSION), BENIGN: Primary | ICD-10-CM

## 2017-08-23 DIAGNOSIS — N18.30 KIDNEY DISEASE, CHRONIC, STAGE III (GFR 30-59 ML/MIN): ICD-10-CM

## 2017-08-23 PROCEDURE — 99212 OFFICE O/P EST SF 10 MIN: CPT | Mod: PBBFAC | Performed by: FAMILY MEDICINE

## 2017-08-23 PROCEDURE — 3078F DIAST BP <80 MM HG: CPT | Mod: ,,, | Performed by: FAMILY MEDICINE

## 2017-08-23 PROCEDURE — 1159F MED LIST DOCD IN RCRD: CPT | Mod: ,,, | Performed by: FAMILY MEDICINE

## 2017-08-23 PROCEDURE — 3074F SYST BP LT 130 MM HG: CPT | Mod: ,,, | Performed by: FAMILY MEDICINE

## 2017-08-23 PROCEDURE — 99215 OFFICE O/P EST HI 40 MIN: CPT | Mod: S$PBB,,, | Performed by: FAMILY MEDICINE

## 2017-08-23 PROCEDURE — 99999 PR PBB SHADOW E&M-EST. PATIENT-LVL II: CPT | Mod: PBBFAC,,, | Performed by: FAMILY MEDICINE

## 2017-08-23 RX ORDER — LEVOTHYROXINE SODIUM 100 UG/1
100 TABLET ORAL DAILY
Qty: 30 TABLET | Refills: 11 | Status: SHIPPED | OUTPATIENT
Start: 2017-08-23 | End: 2017-08-29 | Stop reason: SDUPTHER

## 2017-08-23 NOTE — PROGRESS NOTES
Subjective:       Patient ID: Daniela Harris is a 77 y.o. female.    Chief Complaint: No chief complaint on file.  Daniela Harris 77 y.o. female is here for office visit to review care and physical exam, reports doing well in general.  Was to see Neuro but has appt re-scheduled.  Sees nephrology.  Has much better BP.  Has med compliance.  Niece Jenni still going to house on Thursdays.  TSH still high, rest of labs unremarkable.      HPI  Review of Systems   Constitutional: Negative for activity change, fatigue, fever and unexpected weight change.   HENT: Negative for congestion, hearing loss, postnasal drip and rhinorrhea.    Eyes: Negative for redness and visual disturbance.   Respiratory: Negative for chest tightness, shortness of breath and wheezing.    Cardiovascular: Negative for chest pain, palpitations and leg swelling.   Gastrointestinal: Negative for abdominal distention.   Genitourinary: Negative for decreased urine volume, dysuria, flank pain, hematuria, pelvic pain and urgency.   Musculoskeletal: Negative for back pain, gait problem, joint swelling and neck stiffness.   Skin: Negative for color change, rash and wound.   Neurological: Negative for dizziness, syncope, weakness and headaches.   Psychiatric/Behavioral: Negative for behavioral problems, confusion and sleep disturbance. The patient is not nervous/anxious.        Objective:      Physical Exam   Constitutional: She is oriented to person, place, and time. She appears well-developed and well-nourished. No distress.   HENT:   Head: Normocephalic.   Mouth/Throat: No oropharyngeal exudate.   Eyes: EOM are normal. Pupils are equal, round, and reactive to light. No scleral icterus.   Neck: Neck supple. No JVD present. No thyromegaly present.   Cardiovascular: Normal rate, regular rhythm and normal heart sounds.  Exam reveals no gallop and no friction rub.    No murmur heard.  Pulmonary/Chest: Effort normal and breath sounds normal. She has no  wheezes. She has no rales.   Abdominal: Soft. Bowel sounds are normal. She exhibits no distension and no mass. There is no tenderness. There is no guarding.   Musculoskeletal: Normal range of motion. She exhibits no edema.   Lymphadenopathy:     She has no cervical adenopathy.   Neurological: She is alert and oriented to person, place, and time. She has normal reflexes. She displays normal reflexes. No cranial nerve deficit. She exhibits normal muscle tone.   Skin: Skin is warm. No rash noted. No erythema.   Psychiatric: She has a normal mood and affect. Thought content normal.       Assessment:       1. HTN (hypertension), benign    2. Hyperlipidemia, unspecified hyperlipidemia type    3. Hypothyroidism, unspecified type    4. Kidney disease, chronic, stage III (GFR 30-59 ml/min)    5. Carotid artery plaque, bilateral        Plan:       Diagnoses and all orders for this visit:    HTN (hypertension), benign  - Follow  Hyperlipidemia, unspecified hyperlipidemia type  - Reviewed  Hypothyroidism, unspecified type  -     levothyroxine (SYNTHROID) 100 MCG tablet; Take 1 tablet (100 mcg total) by mouth once daily.    Kidney disease, chronic, stage III (GFR 30-59 ml/min)  - Keep Nephrology appt  Carotid artery plaque, bilateral  - Control risk

## 2017-08-29 DIAGNOSIS — E03.9 HYPOTHYROIDISM, UNSPECIFIED TYPE: ICD-10-CM

## 2017-08-29 RX ORDER — LEVOTHYROXINE SODIUM 100 UG/1
100 TABLET ORAL DAILY
Qty: 30 TABLET | Refills: 11 | Status: SHIPPED | OUTPATIENT
Start: 2017-08-29 | End: 2018-08-29

## 2017-08-29 NOTE — TELEPHONE ENCOUNTER
----- Message from Saskia Manriquez sent at 8/29/2017 12:10 PM CDT -----  Contact: Thomas/ [facility]/ [phone]   Type: Rx    Name of medication(s):  levothyroxine (SYNTHROID) 100 MCG tablet    Is this a refill? New rx? Refill     Who prescribed medication? Dr. Brown     Pharmacy Name, Phone, & Location: [facility] on file     Comments: Thomas is calling to have a refill on the Rx above. Please call and advise     Thank you

## 2017-08-31 ENCOUNTER — OFFICE VISIT (OUTPATIENT)
Dept: NEUROLOGY | Facility: CLINIC | Age: 77
End: 2017-08-31
Payer: MEDICARE

## 2017-08-31 VITALS
SYSTOLIC BLOOD PRESSURE: 157 MMHG | WEIGHT: 158.94 LBS | HEART RATE: 51 BPM | BODY MASS INDEX: 25.54 KG/M2 | DIASTOLIC BLOOD PRESSURE: 71 MMHG | HEIGHT: 66 IN

## 2017-08-31 DIAGNOSIS — F02.80 LATE ONSET ALZHEIMER'S DISEASE WITHOUT BEHAVIORAL DISTURBANCE: Primary | ICD-10-CM

## 2017-08-31 DIAGNOSIS — G30.1 LATE ONSET ALZHEIMER'S DISEASE WITHOUT BEHAVIORAL DISTURBANCE: Primary | ICD-10-CM

## 2017-08-31 PROCEDURE — 1126F AMNT PAIN NOTED NONE PRSNT: CPT | Mod: ,,, | Performed by: NURSE PRACTITIONER

## 2017-08-31 PROCEDURE — 3078F DIAST BP <80 MM HG: CPT | Mod: ,,, | Performed by: NURSE PRACTITIONER

## 2017-08-31 PROCEDURE — 99999 PR PBB SHADOW E&M-EST. PATIENT-LVL III: CPT | Mod: PBBFAC,,, | Performed by: NURSE PRACTITIONER

## 2017-08-31 PROCEDURE — 1159F MED LIST DOCD IN RCRD: CPT | Mod: ,,, | Performed by: NURSE PRACTITIONER

## 2017-08-31 PROCEDURE — 99213 OFFICE O/P EST LOW 20 MIN: CPT | Mod: PBBFAC | Performed by: NURSE PRACTITIONER

## 2017-08-31 PROCEDURE — 99215 OFFICE O/P EST HI 40 MIN: CPT | Mod: S$PBB,,, | Performed by: NURSE PRACTITIONER

## 2017-08-31 PROCEDURE — 3077F SYST BP >= 140 MM HG: CPT | Mod: ,,, | Performed by: NURSE PRACTITIONER

## 2017-08-31 RX ORDER — MEMANTINE HYDROCHLORIDE 10 MG/1
TABLET ORAL
Qty: 60 TABLET | Refills: 11 | Status: SHIPPED | OUTPATIENT
Start: 2017-08-31 | End: 2018-12-06 | Stop reason: SDUPTHER

## 2017-08-31 NOTE — LETTER
September 4, 2017      Junior Mccann III, MD  [address] [address]           [facility]  Neurology  [address]  [facility] [address]  Phone: [phone]  Fax: [phone]          Patient: Daniela Harris   MR Number: [id number]   YOB: 1940   Date of Visit: 8/31/2017       Dear Dr. Junior Mccann III:    Thank you for referring Daniela Harris to me for evaluation. Attached you will find relevant portions of my assessment and plan of care.    If you have questions, please do not hesitate to call me. I look forward to following Daniela Harris along with you.    Sincerely,    Elsy Lovelace, BRANDY    Enclosure  CC:  No Recipients    If you would like to receive this communication electronically, please contact [email or web address] or [phone] to request more information on [employer] Link access.    For providers and/or their staff who would like to refer a patient to [facility], please contact us through our one-stop-shop provider referral line, [facility], at [phone].    If you feel you have received this communication in error or would no longer like to receive these types of communications, please e-mail [email or web address]

## 2017-08-31 NOTE — PROGRESS NOTES
"Name: Daniela Harris  MRN: [id number]   CSN: [id number]      Date: 8-31-17    Referring physician:  Junior Mccann III, MD  [address] [place] [address]    Subjective:      Chief Complaint: follow up    History of Present Illness (HPI):    Daniela Harris is a 77 y.o.  female who presents today for a follow-up (new to me) evaluation of Dementia (AD and cerebrovascular per Dr. Mccann notes)  and is accompanied by spouse of 59 years.     Previously seen in this office by Dr. Mccann in May.. At that time, she was re-started on donepezil 5 mg daily (intolerable at  10 mg daily) and initiated memantine 5 mg titrating to BID, here for follow up.      She feels her memeory is "good now." He shakes his head no behind her. He says she does not know the days or dates. She adds that she feels her memory is better for everything and does not need to know the days or dates.     She is dong fine on her current regimen of memantine.    She is independent with feeding, dressing and bathing.   She drives around the block to see her sister. Avoids busy times.    cooks.   She does laundry and does not have any problems with this.     Good sleep.   Mood good.   Cousin is a nurse and she sets up their meds.     She and her  go to [employer] every day.         Review of Systems   Constitutional: Negative for activity change and appetite change.   HENT: Negative for trouble swallowing.    Respiratory: Negative for choking.    Neurological: Negative for dizziness.   Psychiatric/Behavioral: Positive for confusion. Negative for sleep disturbance.       Past Medical History: The patient  has a past medical history of After-cataract, obscuring vision - Left Eye (2/28/2014); Arthritis; Diverticulitis; Goiter; History of appendectomy; Hyperlipidemia; Hypothyroidism; Osteopenia; Other and unspecified hyperlipidemia; Peptic ulcer; Psoriasis; Stroke; Unspecified essential hypertension; Unspecified vitamin D deficiency; and Vitamin B 12 " deficiency.    Social History: The patient  reports that she has never smoked. She has never used smokeless tobacco. She reports that she does not drink alcohol or use drugs.    Family History: Their family history includes Alzheimer's disease in her father; Cataracts in her father; Dementia in her father and maternal uncle; Diabetes in her mother and sister; Hyperlipidemia in her sister; Hypertension in her father, sister, and sister; No Known Problems in her brother, maternal aunt, maternal grandfather, paternal aunt, paternal grandfather, paternal grandmother, and paternal uncle; Parkinsonism in her sister; Thyroid disease in her cousin, maternal grandmother, and sister.    Allergies: Keflex  [cephalexin]     Meds:   Current Outpatient Prescriptions on File Prior to Visit   Medication Sig Dispense Refill    amlodipine (NORVASC) 10 MG tablet Take 1 tablet (10 mg total) by mouth once daily. 30 tablet 6    ascorbic acid (VITAMIN C) 500 MG tablet Take 500 mg by mouth. 1 Tablet Oral Every day      atorvastatin (LIPITOR) 80 MG tablet Take 1 tablet (80 mg total) by mouth once daily. 90 tablet 3    b complex vitamins (VITAMINS B COMPLEX) tablet Take 1 tablet by mouth once daily. 1 Tablet Oral Every day 30 tablet 6    CALCIUM CARBONATE (CALCIUM 600 ORAL) Take 600 mg by mouth. 1  By mouth Every day      cloNIDine (CATAPRES) 0.1 MG tablet Take 1 tablet (0.1 mg total) by mouth 2 (two) times daily. 60 tablet 11    glucosamine & chondroit sul.Na 750-400 mg Cmpk Take by mouth. 1  By mouth Every day      levothyroxine (SYNTHROID) 100 MCG tablet Take 1 tablet (100 mcg total) by mouth once daily. 30 tablet 11    lisinopril-hydrochlorothiazide (PRINZIDE,ZESTORETIC) 20-25 mg Tab Take 1 tablet by mouth once daily. 90 tablet 3    multivitamin (THERAGRAN) per tablet Take by mouth. 1 Tablet Oral Every day      aspirin (ECOTRIN) 81 MG EC tablet Take 1 tablet (81 mg total) by mouth once daily. 30 tablet 5    ferrous gluconate  "(FERGON) 324 MG tablet Take 1 tablet (324 mg total) by mouth 2 (two) times daily with meals. 60 tablet 3    levothyroxine (SYNTHROID) 75 MCG tablet Take 1 tablet (75 mcg total) by mouth after dinner. 30 tablet 11    pantoprazole (PROTONIX) 40 MG tablet Take 1 tablet (40 mg total) by mouth once daily. 30 tablet 11    VITAMIN D2 50,000 unit capsule take 1 capsule by mouth every week 12 capsule 4     Current Facility-Administered Medications on File Prior to Visit   Medication Dose Route Frequency Provider Last Rate Last Dose    cloNIDine tablet 0.1 mg  0.1 mg Oral 1 time in Clinic/HOD Pranay Brown MD           Objective:     Physical Exam:    Vitals:    17 1455   BP: (!) 157/71   Pulse: (!) 51   Weight: 72.1 kg (158 lb 15.2 oz)   Height: 5' 6" (1.676 m)     Body mass index is 25.66 kg/m².    Constitutional  Well-developed, well-nourished, appears stated age  Smiles, participates, bright affect     ..  Neurological    * Mental status  MOCA = attempted - once asked to do immediate memory trails, she is not interested.     Oriented to: person, place, situation and .Oreinted to , address.   Not oriented to: time, day of week, month of year and year  When asked safety scenarios, is not able to answer 911 as the emergency number until directly asked after saying she would call neighbors and family in event of fire.      - Memory   - Orientation  Impaired     - Attention/concentration  Normal     - Language  conversant     - Fund of knowledge  "Lot of bad going on in the world." When prompted a bit, does mention flooding in [place].   Not    Gait normal and steady.         Laboratory Results:  Lab Visit on 2017   Component Date Value Ref Range Status    Specimen UA 2017 Urine, Clean Catch   Final    Color, UA 2017 Yellow  Yellow, Straw, Ofe Final    Appearance, UA 2017 Hazy* Clear Final    pH, UA 2017 5.0  5.0 - 8.0 Final    Specific Gravity, UA 2017 1.020  " 1.005 - 1.030 Final    Protein, UA 08/16/2017 Negative  Negative Final    Glucose, UA 08/16/2017 Negative  Negative Final    Ketones, UA 08/16/2017 Negative  Negative Final    Bilirubin (UA) 08/16/2017 Negative  Negative Final    Occult Blood UA 08/16/2017 Negative  Negative Final    Nitrite, UA 08/16/2017 Negative  Negative Final    Urobilinogen, UA 08/16/2017 Negative  <2.0 EU/dL Final    Leukocytes, UA 08/16/2017 3+* Negative Final    Protein, Urine Random 08/16/2017 9  0 - 15 mg/dL Final    Creatinine, Random Ur 08/16/2017 163.0  15.0 - 325.0 mg/dL Final    Prot/Creat Ratio, Ur 08/16/2017 0.06  0.00 - 0.20 Final    RBC, UA 08/16/2017 1  0 - 4 /hpf Final    WBC, UA 08/16/2017 6* 0 - 5 /hpf Final    Bacteria, UA 08/16/2017 Occasional  None-Occ /hpf Final    Squam Epithel, UA 08/16/2017 5  /hpf Final    Non-Squam Epith 08/16/2017 <1  <1/hpf /hpf Final    Hyaline Casts, UA 08/16/2017 3* 0-1/lpf /lpf Final    Microscopic Comment 08/16/2017 SEE COMMENT   Final   Lab Visit on 08/16/2017   Component Date Value Ref Range Status    Cholesterol 08/16/2017 209* 120 - 199 mg/dL Final    Triglycerides 08/16/2017 133  30 - 150 mg/dL Final    HDL 08/16/2017 61  40 - 75 mg/dL Final    LDL Cholesterol 08/16/2017 121.4  63.0 - 159.0 mg/dL Final    HDL/Chol Ratio 08/16/2017 29.2  20.0 - 50.0 % Final    Total Cholesterol/HDL Ratio 08/16/2017 3.4  2.0 - 5.0 Final    Non-HDL Cholesterol 08/16/2017 148  mg/dL Final    Sodium 08/16/2017 141  136 - 145 mmol/L Final    Potassium 08/16/2017 4.5  3.5 - 5.1 mmol/L Final    Chloride 08/16/2017 104  95 - 110 mmol/L Final    CO2 08/16/2017 28  23 - 29 mmol/L Final    Glucose 08/16/2017 95  70 - 110 mg/dL Final    BUN, Bld 08/16/2017 25* 8 - 23 mg/dL Final    Creatinine 08/16/2017 1.4  0.5 - 1.4 mg/dL Final    Calcium 08/16/2017 9.6  8.7 - 10.5 mg/dL Final    Total Protein 08/16/2017 7.8  6.0 - 8.4 g/dL Final    Albumin 08/16/2017 4.1  3.5 - 5.2 g/dL Final     Total Bilirubin 08/16/2017 0.8  0.1 - 1.0 mg/dL Final    Alkaline Phosphatase 08/16/2017 52* 55 - 135 U/L Final    AST 08/16/2017 23  10 - 40 U/L Final    ALT 08/16/2017 23  10 - 44 U/L Final    Anion Gap 08/16/2017 9  8 - 16 mmol/L Final    eGFR if  08/16/2017 41* >60 mL/min/1.73 m^2 Final    eGFR if non  08/16/2017 36* >60 mL/min/1.73 m^2 Final    Hemoglobin A1C 08/17/2017 5.3  4.0 - 5.6 % Final    Estimated Avg Glucose 08/17/2017 105  68 - 131 mg/dL Final    TSH 08/16/2017 25.678* 0.400 - 4.000 uIU/mL Final    WBC 08/16/2017 7.45  3.90 - 12.70 K/uL Final    RBC 08/16/2017 3.92* 4.00 - 5.40 M/uL Final    Hemoglobin 08/16/2017 12.2  12.0 - 16.0 g/dL Final    Hematocrit 08/16/2017 37.5  37.0 - 48.5 % Final    MCV 08/16/2017 96  82 - 98 fL Final    MCH 08/16/2017 31.1* 27.0 - 31.0 pg Final    MCHC 08/16/2017 32.5  32.0 - 36.0 g/dL Final    RDW 08/16/2017 13.6  11.5 - 14.5 % Final    Platelets 08/16/2017 222  150 - 350 K/uL Final    MPV 08/16/2017 10.9  9.2 - 12.9 fL Final    Gran # 08/16/2017 4.9  1.8 - 7.7 K/uL Final    Lymph # 08/16/2017 1.7  1.0 - 4.8 K/uL Final    Mono # 08/16/2017 0.6  0.3 - 1.0 K/uL Final    Eos # 08/16/2017 0.2  0.0 - 0.5 K/uL Final    Baso # 08/16/2017 0.03  0.00 - 0.20 K/uL Final    Gran% 08/16/2017 65.5  38.0 - 73.0 % Final    Lymph% 08/16/2017 23.2  18.0 - 48.0 % Final    Mono% 08/16/2017 7.8  4.0 - 15.0 % Final    Eosinophil% 08/16/2017 3.0  0.0 - 8.0 % Final    Basophil% 08/16/2017 0.4  0.0 - 1.9 % Final    Differential Method 08/16/2017 Automated   Final    Glucose 08/16/2017 95  70 - 110 mg/dL Final    Sodium 08/16/2017 141  136 - 145 mmol/L Final    Potassium 08/16/2017 4.5  3.5 - 5.1 mmol/L Final    Chloride 08/16/2017 104  95 - 110 mmol/L Final    CO2 08/16/2017 28  23 - 29 mmol/L Final    BUN, Bld 08/16/2017 25* 8 - 23 mg/dL Final    Calcium 08/16/2017 9.6  8.7 - 10.5 mg/dL Final    Creatinine 08/16/2017 1.4  0.5  - 1.4 mg/dL Final    Albumin 08/16/2017 4.1  3.5 - 5.2 g/dL Final    Phosphorus 08/16/2017 4.5  2.7 - 4.5 mg/dL Final    eGFR if  08/16/2017 41* >60 mL/min/1.73 m^2 Final    eGFR if non  08/16/2017 36* >60 mL/min/1.73 m^2 Final    Anion Gap 08/16/2017 9  8 - 16 mmol/L Final    PTH, Intact 08/16/2017 30.0  9.0 - 77.0 pg/mL Final    Vit D, 25-Hydroxy 08/16/2017 44  30 - 96 ng/mL Final    Free T4 08/16/2017 0.86  0.71 - 1.51 ng/dL Final   Hospital Outpatient Visit on 06/12/2017   Component Date Value Ref Range Status    POC Creatinine 06/12/2017 1.2  0.5 - 1.4 mg/dL Final    Sample 06/12/2017 VENOUS   Final       Imaging:   Independent review of images: MRI brain 2014      NP Evaluation- 05/2015 SUMMARY AND RECOMMENDATIONS:  Neuropsychological testing is consistent with mild to moderate dementia with impairment in immediate and delayed auditory/verbal and visual memory, attention, visuospatial perception, verbal fluency, abstract reasoning, psychomotor speed, and mental flexibility and variability in constructional ability and naming (average and impaired performances in both areas). History suggests a neurodegenerative dementia. The severity of memory impairment present suggests an Alzheimers type dementia.       Assessment and Plan     Late onset Alzheimer's disease without behavioral disturbance  -     memantine (NAMENDA) 10 MG Tab; 1 tablet twice daily as directed  Dispense: 60 tablet; Refill: 11        Medical Decision Making:    Increase Namenda from 5 mg BID to 10 mg BID.   Rx sent.   Consider cessation of driving.   Discussed safety factors and considerations.   Instructions discussed.  Call for problems.   Follow up 6 months. Will attempt MOCA again at that time.        I spent 45  minutes face-to-face with the patient and   with >50% of the time spent with counseling and education regarding:  - results of data, diagnosis, and recommendations stated above  -  the prognosis of dementia  - risks and benefits of Namenda  - importance of diet and exercise    Elsy Lovelace, RAINA, NP-C  Division of Movement and Memory Disorders  [facility]  [phone]

## 2017-08-31 NOTE — PATIENT INSTRUCTIONS
Increase memantine (Namenda) 5 mg to 10 mg as follows...    Week 1- take 10 mg in morning and 5 mg at bedtime    Week 2- take 10 mg twice daily    I have sent a new prescription for memantine 10 mg tablets to the pharmacy.

## 2017-09-22 DIAGNOSIS — N18.30 CHRONIC KIDNEY DISEASE, STAGE III (MODERATE): Primary | ICD-10-CM

## 2017-09-27 ENCOUNTER — LAB VISIT (OUTPATIENT)
Dept: LAB | Facility: HOSPITAL | Age: 77
End: 2017-09-27
Payer: MEDICARE

## 2017-09-27 ENCOUNTER — OFFICE VISIT (OUTPATIENT)
Dept: NEPHROLOGY | Facility: CLINIC | Age: 77
End: 2017-09-27
Payer: MEDICARE

## 2017-09-27 VITALS
WEIGHT: 159.81 LBS | HEIGHT: 66 IN | HEART RATE: 69 BPM | SYSTOLIC BLOOD PRESSURE: 138 MMHG | BODY MASS INDEX: 25.68 KG/M2 | DIASTOLIC BLOOD PRESSURE: 80 MMHG | OXYGEN SATURATION: 97 %

## 2017-09-27 DIAGNOSIS — N18.30 KIDNEY DISEASE, CHRONIC, STAGE III (GFR 30-59 ML/MIN): Primary | ICD-10-CM

## 2017-09-27 DIAGNOSIS — N18.30 CHRONIC KIDNEY DISEASE, STAGE III (MODERATE): ICD-10-CM

## 2017-09-27 DIAGNOSIS — D50.9 IRON DEFICIENCY ANEMIA, UNSPECIFIED IRON DEFICIENCY ANEMIA TYPE: ICD-10-CM

## 2017-09-27 LAB
ALBUMIN SERPL BCP-MCNC: 3.6 G/DL
ANION GAP SERPL CALC-SCNC: 8 MMOL/L
BUN SERPL-MCNC: 20 MG/DL
CALCIUM SERPL-MCNC: 9.1 MG/DL
CHLORIDE SERPL-SCNC: 102 MMOL/L
CO2 SERPL-SCNC: 30 MMOL/L
CREAT SERPL-MCNC: 1.4 MG/DL
EST. GFR  (AFRICAN AMERICAN): 41.2 ML/MIN/1.73 M^2
EST. GFR  (NON AFRICAN AMERICAN): 35.8 ML/MIN/1.73 M^2
GLUCOSE SERPL-MCNC: 93 MG/DL
PHOSPHATE SERPL-MCNC: 4.1 MG/DL
POTASSIUM SERPL-SCNC: 3.9 MMOL/L
SODIUM SERPL-SCNC: 140 MMOL/L

## 2017-09-27 PROCEDURE — 3079F DIAST BP 80-89 MM HG: CPT | Mod: GC,,, | Performed by: INTERNAL MEDICINE

## 2017-09-27 PROCEDURE — 99999 PR PBB SHADOW E&M-EST. PATIENT-LVL IV: CPT | Mod: PBBFAC,GC,, | Performed by: INTERNAL MEDICINE

## 2017-09-27 PROCEDURE — 1159F MED LIST DOCD IN RCRD: CPT | Mod: GC,,, | Performed by: INTERNAL MEDICINE

## 2017-09-27 PROCEDURE — 99214 OFFICE O/P EST MOD 30 MIN: CPT | Mod: PBBFAC | Performed by: INTERNAL MEDICINE

## 2017-09-27 PROCEDURE — 36415 COLL VENOUS BLD VENIPUNCTURE: CPT

## 2017-09-27 PROCEDURE — 1126F AMNT PAIN NOTED NONE PRSNT: CPT | Mod: GC,,, | Performed by: INTERNAL MEDICINE

## 2017-09-27 PROCEDURE — 3075F SYST BP GE 130 - 139MM HG: CPT | Mod: GC,,, | Performed by: INTERNAL MEDICINE

## 2017-09-27 PROCEDURE — 80069 RENAL FUNCTION PANEL: CPT

## 2017-09-27 PROCEDURE — 99214 OFFICE O/P EST MOD 30 MIN: CPT | Mod: S$PBB,GC,, | Performed by: INTERNAL MEDICINE

## 2017-09-27 NOTE — PROGRESS NOTES
Subjective:       Patient ID: Daniela Harris is a 77 y.o. 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 Patient has longstanding history of HTN, it is felt her CKD is from HTN.  Renal artery dopplers are negative, renin/aldosterone ratio and levels are negative. No recent hospitalization. Patient denies any recent illness, nausea, vomiting, diarrhea, decreased urine output or any blood on urine. Denies use of NSAIDs.  Labs are noted to be stable over the past several months.      Review of Systems   Respiratory: Negative for shortness of breath.    Cardiovascular: Negative for leg swelling.   Gastrointestinal: Negative for abdominal pain.   Endocrine: Negative for polyuria.   Genitourinary: Negative for difficulty urinating, dysuria and frequency.       Objective:      Physical Exam   HENT:   Head: Normocephalic and atraumatic.   Eyes: EOM are normal.   Neck: No JVD present.   Cardiovascular: Normal rate and regular rhythm.    Pulmonary/Chest: Effort normal and breath sounds normal.   Abdominal: Soft. She exhibits no distension.   Musculoskeletal: She exhibits no edema or tenderness.   Neurological: She is alert.   Skin: Skin is warm.   Psychiatric: She has a normal mood and affect. Her behavior is normal.       Assessment & Plan:       Kidney disease, chronic, stage III (GFR 30-59 ml/min)  1. 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     Lab Results   Component Value Date    CREATININE 1.4  08/16/2017    CREATININE 1.4 08/16/2017     Protein Creatinine Ratios:   negative    Prot/Creat Ratio, Ur   Date Value Ref Range Status   08/16/2017 0.06 0.00 - 0.20 Final   05/11/2017 0.04 0.00 - 0.20 Final     ·   ·   Acid-Base:   Stable, no need for bicarb supplement  Lab Results   Component Value Date     08/16/2017     08/16/2017    K 4.5 08/16/2017    K 4.5 08/16/2017    CO2 28 08/16/2017    CO2 28 08/16/2017     2. HTN: Blood pressures are currently controlled on ACE, HCTZ, amldoipine and clonidine, importance of compliance emphasized    3. Renal osteodystrophy: last PTH   Lab Results   Component Value Date    PTH 30.0 08/16/2017    CALCIUM 9.6 08/16/2017    CALCIUM 9.6 08/16/2017    PHOS 4.5 08/16/2017       4. Anemia:   No recent iron studies, will check for next visit  Lab Results   Component Value Date    HGB 12.2 08/16/2017        5. DM:  Last HbA1C   Lab Results   Component Value Date    HGBA1C 5.3 08/16/2017       6. Lipid management:   Lab Results   Component Value Date    LDLCALC 121.4 08/16/2017       7. ESRD planing:     Follow up in 6 monhts    Patient seen with Dr Don

## 2017-09-27 NOTE — ASSESSMENT & PLAN NOTE
1. 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     Lab Results   Component Value Date    CREATININE 1.4 08/16/2017    CREATININE 1.4 08/16/2017     Protein Creatinine Ratios:   negative    Prot/Creat Ratio, Ur   Date Value Ref Range Status   08/16/2017 0.06 0.00 - 0.20 Final   05/11/2017 0.04 0.00 - 0.20 Final     ·   ·   Acid-Base:   Stable, no need for bicarb supplement  Lab Results   Component Value Date     08/16/2017     08/16/2017    K 4.5 08/16/2017    K 4.5 08/16/2017    CO2 28 08/16/2017    CO2 28 08/16/2017     2. HTN: Blood pressures are currently controlled on ACE, HCTZ, amldoipine and clonidine, importance of compliance emphasized    3. Renal osteodystrophy: last PTH   Lab Results   Component Value Date    PTH 30.0 08/16/2017    CALCIUM 9.6 08/16/2017    CALCIUM 9.6 08/16/2017    PHOS 4.5 08/16/2017       4. Anemia:   No recent iron studies, will check for next visit  Lab Results   Component Value Date    HGB 12.2 08/16/2017        5. DM:  Last HbA1C   Lab Results   Component Value Date    HGBA1C 5.3 08/16/2017       6. Lipid management:   Lab Results   Component Value Date    LDLCALC 121.4 08/16/2017       7. ESRD planing:     Follow up in 6 [facility]    Patient seen with Dr Don

## 2017-09-27 NOTE — PATIENT INSTRUCTIONS
1) follow up in six monhts  2) please have urine and blood tests done as ordered one week prior to visit

## 2017-09-28 NOTE — PROGRESS NOTES
[facility]    The note from the fellow/resident was reviewed. I have personally interviewed and examined the patient. There were no additional findings with regards to the history or physical exam.    I agree with the assessment and plan of Dr. Hayden.

## 2017-10-04 ENCOUNTER — OFFICE VISIT (OUTPATIENT)
Dept: OPTOMETRY | Facility: CLINIC | Age: 77
End: 2017-10-04
Payer: MEDICARE

## 2017-10-04 DIAGNOSIS — H35.373 EPIRETINAL MEMBRANE (ERM) OF BOTH EYES: Primary | ICD-10-CM

## 2017-10-04 DIAGNOSIS — Z96.1 PSEUDOPHAKIA: ICD-10-CM

## 2017-10-04 DIAGNOSIS — I10 HTN (HYPERTENSION), BENIGN: ICD-10-CM

## 2017-10-04 PROCEDURE — 99212 OFFICE O/P EST SF 10 MIN: CPT | Mod: PBBFAC,PO | Performed by: OPTOMETRIST

## 2017-10-04 PROCEDURE — 99999 PR PBB SHADOW E&M-EST. PATIENT-LVL II: CPT | Mod: PBBFAC,,, | Performed by: OPTOMETRIST

## 2017-10-04 PROCEDURE — 92014 COMPRE OPH EXAM EST PT 1/>: CPT | Mod: S$PBB,,, | Performed by: OPTOMETRIST

## 2017-10-04 NOTE — PROGRESS NOTES
Subjective:       Patient ID: Daniela Harris is a 77 y.o. female      Chief Complaint   Patient presents with    Concerns About Ocular Health     History of Present Illness  Dls: 2/12/15 Dr. Galindo     Pt states no changes in vision. Pt wears otc readers. Declines refraction.   Does not want glasses for distance. Pt states no tearing no itching no   burning no pain no ha's no floaters.     Eye meds:   None       Assessment/Plan:     1. Epiretinal membrane (ERM) of both eyes  NVS. Monitor.     2. HTN (hypertension), benign  No hypertensive retinopathy. Continue BP control. RTC 1 year for DFE.    3.Pseudophakia - Both Eyes  Well-centered. Clear.     Pt declined refraction. Continue with OTC readers.    Return in about 1 year (around 10/4/2018) for Annual eye exam.

## 2018-01-09 ENCOUNTER — TELEPHONE (OUTPATIENT)
Dept: NEUROLOGY | Facility: CLINIC | Age: 78
End: 2018-01-09

## 2018-01-09 NOTE — TELEPHONE ENCOUNTER
----- Message from Ariadna Meneses sent at 1/9/2018 10:49 AM CST -----  Contact: pt  Jayant   Pt would like to be called back regarding scheduling recall appt after 2/27/18. Pt would like to speak with nurse about health concerns.       Pt can be reached at [phone]. .

## 2018-02-21 RX ORDER — ERGOCALCIFEROL 1.25 MG/1
CAPSULE ORAL
Qty: 12 CAPSULE | Refills: 4 | Status: SHIPPED | OUTPATIENT
Start: 2018-02-21 | End: 2018-09-20

## 2018-03-27 RX ORDER — FERROUS GLUCONATE 324(38)MG
TABLET ORAL
Qty: 60 TABLET | Refills: 3 | Status: SHIPPED | OUTPATIENT
Start: 2018-03-27 | End: 2018-11-05 | Stop reason: SDUPTHER

## 2018-04-11 ENCOUNTER — OFFICE VISIT (OUTPATIENT)
Dept: INTERNAL MEDICINE | Facility: CLINIC | Age: 78
End: 2018-04-11
Payer: MEDICARE

## 2018-04-11 ENCOUNTER — HOSPITAL ENCOUNTER (OUTPATIENT)
Dept: RADIOLOGY | Facility: HOSPITAL | Age: 78
Discharge: HOME | End: 2018-04-11
Attending: FAMILY MEDICINE
Payer: MEDICARE

## 2018-04-11 VITALS
SYSTOLIC BLOOD PRESSURE: 114 MMHG | DIASTOLIC BLOOD PRESSURE: 68 MMHG | HEART RATE: 49 BPM | WEIGHT: 150 LBS | HEIGHT: 67 IN | OXYGEN SATURATION: 98 % | BODY MASS INDEX: 23.54 KG/M2

## 2018-04-11 DIAGNOSIS — I67.2 CEREBRAL ATHEROSCLEROSIS: ICD-10-CM

## 2018-04-11 DIAGNOSIS — E03.9 ACQUIRED HYPOTHYROIDISM: ICD-10-CM

## 2018-04-11 DIAGNOSIS — E78.00 PURE HYPERCHOLESTEROLEMIA: ICD-10-CM

## 2018-04-11 DIAGNOSIS — Z00.00 PREVENTATIVE HEALTH CARE: ICD-10-CM

## 2018-04-11 DIAGNOSIS — Z12.31 VISIT FOR SCREENING MAMMOGRAM: ICD-10-CM

## 2018-04-11 DIAGNOSIS — N18.30 KIDNEY DISEASE, CHRONIC, STAGE III (GFR 30-59 ML/MIN): ICD-10-CM

## 2018-04-11 DIAGNOSIS — I10 HTN (HYPERTENSION), BENIGN: Primary | ICD-10-CM

## 2018-04-11 DIAGNOSIS — G93.40 ENCEPHALOPATHIES: ICD-10-CM

## 2018-04-11 PROCEDURE — 77067 SCR MAMMO BI INCL CAD: CPT | Mod: TC

## 2018-04-11 PROCEDURE — 99397 PER PM REEVAL EST PAT 65+ YR: CPT | Mod: S$PBB,,, | Performed by: FAMILY MEDICINE

## 2018-04-11 PROCEDURE — 99999 PR PBB SHADOW E&M-EST. PATIENT-LVL IV: CPT | Mod: PBBFAC,,, | Performed by: FAMILY MEDICINE

## 2018-04-11 PROCEDURE — 77067 SCR MAMMO BI INCL CAD: CPT | Mod: 26,,, | Performed by: RADIOLOGY

## 2018-04-11 PROCEDURE — 99214 OFFICE O/P EST MOD 30 MIN: CPT | Mod: PBBFAC | Performed by: FAMILY MEDICINE

## 2018-04-11 NOTE — PROGRESS NOTES
Subjective:       Patient ID: Daniela Harris is a 78 y.o. female.    Chief Complaint: Follow-up  Daniela Harris 78 y.o. female is here for office visit to review care and physical exam, reports doing well with unremarkable ROS.      HPI  Review of Systems   Constitutional: Negative for activity change, fatigue, fever and unexpected weight change.   HENT: Negative for congestion, hearing loss, postnasal drip and rhinorrhea.    Eyes: Negative for redness and visual disturbance.   Respiratory: Negative for chest tightness, shortness of breath and wheezing.    Cardiovascular: Negative for chest pain, palpitations and leg swelling.   Gastrointestinal: Negative for abdominal distention.   Genitourinary: Negative for decreased urine volume, dysuria, flank pain, hematuria, pelvic pain and urgency.   Musculoskeletal: Negative for back pain, gait problem, joint swelling and neck stiffness.   Skin: Negative for color change, rash and wound.   Neurological: Negative for dizziness, syncope, weakness and headaches.   Psychiatric/Behavioral: Negative for behavioral problems, confusion and sleep disturbance. The patient is not nervous/anxious.        Objective:      Physical Exam   Constitutional: She is oriented to person, place, and time. She appears well-developed and well-nourished. No distress.   HENT:   Head: Normocephalic.   Mouth/Throat: No oropharyngeal exudate.   Eyes: EOM are normal. Pupils are equal, round, and reactive to light. No scleral icterus.   Neck: Neck supple. No JVD present. No thyromegaly present.   Cardiovascular: Normal rate, regular rhythm and normal heart sounds.  Exam reveals no gallop and no friction rub.    No murmur heard.  Pulmonary/Chest: Effort normal and breath sounds normal. She has no wheezes. She has no rales.   Abdominal: Soft. Bowel sounds are normal. She exhibits no distension and no mass. There is no tenderness. There is no guarding.   Musculoskeletal: Normal range of motion. She exhibits  no edema.   Lymphadenopathy:     She has no cervical adenopathy.   Neurological: She is alert and oriented to person, place, and time. She has normal reflexes. She displays normal reflexes. No cranial nerve deficit. She exhibits normal muscle tone.   Skin: Skin is warm. No rash noted. No erythema.   Psychiatric: She has a normal mood and affect. Thought content normal.       Assessment:       1. HTN (hypertension), benign    2. Pure hypercholesterolemia    3. Encephalopathies    4. Cerebral atherosclerosis    5. Acquired hypothyroidism    6. Kidney disease, chronic, stage III (GFR 30-59 ml/min)    7. Preventative health care        Plan:       Daniela was seen today for follow-up.    Diagnoses and all orders for this visit:    HTN (hypertension), benign  -     Comprehensive metabolic panel; Future, controled    Pure hypercholesterolemia  -     Comprehensive metabolic panel; Future, likely controled  -     Lipid panel; Future    Encephalopathies  -     Comprehensive metabolic panel; Future, chart reviewed  -     CBC auto differential; Future    Cerebral atherosclerosis  -     CBC auto differential; Future, control risk. Memory discussed    Acquired hypothyroidism  -     Comprehensive metabolic panel; Future  -     TSH; Future    Kidney disease, chronic, stage III (GFR 30-59 ml/min)  -     Comprehensive metabolic panel; Future    Preventative health care  -     Comprehensive metabolic panel; Future  -     Lipid panel; Future  -     CBC auto differential; Future  -     Hemoglobin A1c; Future  -     TSH; Future  -     Mammo Digital Screening Bilateral With CAD; Future

## 2018-04-18 ENCOUNTER — TELEPHONE (OUTPATIENT)
Dept: INTERNAL MEDICINE | Facility: CLINIC | Age: 78
End: 2018-04-18

## 2018-04-18 NOTE — TELEPHONE ENCOUNTER
----- Message from Ronel Diehl sent at 4/18/2018  2:19 PM CDT -----  Contact: cousin/stacia/[phone]  Pt cousin called in regards to the pt last visit the dr told them to stop taking a medication and she would like to know which medication is it. She helps them with there meds.      Please advise

## 2018-05-02 DIAGNOSIS — E78.5 HYPERLIPIDEMIA, UNSPECIFIED HYPERLIPIDEMIA TYPE: ICD-10-CM

## 2018-05-02 DIAGNOSIS — I63.9 ACUTE ISCHEMIC STROKE: ICD-10-CM

## 2018-05-02 RX ORDER — ATORVASTATIN CALCIUM 80 MG/1
TABLET, FILM COATED ORAL
Qty: 90 TABLET | Refills: 3 | Status: SHIPPED | OUTPATIENT
Start: 2018-05-02 | End: 2019-04-04 | Stop reason: SDUPTHER

## 2018-05-05 DIAGNOSIS — I63.9 ACUTE ISCHEMIC STROKE: ICD-10-CM

## 2018-05-05 DIAGNOSIS — E78.5 HYPERLIPIDEMIA, UNSPECIFIED HYPERLIPIDEMIA TYPE: ICD-10-CM

## 2018-05-07 RX ORDER — ATORVASTATIN CALCIUM 80 MG/1
TABLET, FILM COATED ORAL
Qty: 90 TABLET | Refills: 3 | Status: SHIPPED | OUTPATIENT
Start: 2018-05-07 | End: 2019-02-13 | Stop reason: SDUPTHER

## 2018-05-09 DIAGNOSIS — I10 ESSENTIAL HYPERTENSION: ICD-10-CM

## 2018-05-10 RX ORDER — PANTOPRAZOLE SODIUM 40 MG/1
TABLET, DELAYED RELEASE ORAL
Qty: 30 TABLET | Refills: 11 | Status: SHIPPED | OUTPATIENT
Start: 2018-05-10 | End: 2019-02-15 | Stop reason: SDUPTHER

## 2018-05-10 RX ORDER — CLONIDINE HYDROCHLORIDE 0.1 MG/1
TABLET ORAL
Qty: 60 TABLET | Refills: 11 | Status: SHIPPED | OUTPATIENT
Start: 2018-05-10 | End: 2019-02-13 | Stop reason: SDUPTHER

## 2018-05-17 RX ORDER — AMLODIPINE BESYLATE 10 MG/1
TABLET ORAL
Qty: 30 TABLET | Refills: 6 | Status: SHIPPED | OUTPATIENT
Start: 2018-05-17 | End: 2018-10-24

## 2018-08-27 ENCOUNTER — TELEPHONE (OUTPATIENT)
Dept: INTERNAL MEDICINE | Facility: CLINIC | Age: 78
End: 2018-08-27

## 2018-08-27 RX ORDER — LISINOPRIL 20 MG/1
20 TABLET ORAL DAILY
Qty: 90 TABLET | Refills: 0 | Status: SHIPPED | OUTPATIENT
Start: 2018-08-27 | End: 2019-01-04 | Stop reason: SDUPTHER

## 2018-08-27 RX ORDER — LISINOPRIL AND HYDROCHLOROTHIAZIDE 20; 25 MG/1; MG/1
1 TABLET ORAL DAILY
Qty: 90 TABLET | Refills: 3 | Status: CANCELLED | OUTPATIENT
Start: 2018-08-27 | End: 2019-08-27

## 2018-08-27 NOTE — TELEPHONE ENCOUNTER
Evelin please call pt and tell her I changed her BP med to plain Lisinopril and not Lisinopril with HCTZ.  The HCTZ is what is driving up her Creatinine most likely.

## 2018-09-01 ENCOUNTER — LAB VISIT (OUTPATIENT)
Dept: LAB | Facility: HOSPITAL | Age: 78
End: 2018-09-01
Payer: MEDICARE

## 2018-09-01 DIAGNOSIS — D50.9 IRON DEFICIENCY ANEMIA, UNSPECIFIED IRON DEFICIENCY ANEMIA TYPE: ICD-10-CM

## 2018-09-01 DIAGNOSIS — N18.30 KIDNEY DISEASE, CHRONIC, STAGE III (GFR 30-59 ML/MIN): ICD-10-CM

## 2018-09-01 LAB
25(OH)D3+25(OH)D2 SERPL-MCNC: 60 NG/ML
ALBUMIN SERPL BCP-MCNC: 3.9 G/DL
ANION GAP SERPL CALC-SCNC: 11 MMOL/L
BASOPHILS # BLD AUTO: 0.04 K/UL
BASOPHILS NFR BLD: 0.5 %
BUN SERPL-MCNC: 28 MG/DL
CALCIUM SERPL-MCNC: 9.8 MG/DL
CHLORIDE SERPL-SCNC: 102 MMOL/L
CO2 SERPL-SCNC: 27 MMOL/L
CREAT SERPL-MCNC: 1.6 MG/DL
DIFFERENTIAL METHOD: ABNORMAL
EOSINOPHIL # BLD AUTO: 0.2 K/UL
EOSINOPHIL NFR BLD: 2.4 %
ERYTHROCYTE [DISTWIDTH] IN BLOOD BY AUTOMATED COUNT: 13.1 %
EST. GFR  (AFRICAN AMERICAN): 34.8 ML/MIN/1.73 M^2
EST. GFR  (NON AFRICAN AMERICAN): 30.2 ML/MIN/1.73 M^2
FERRITIN SERPL-MCNC: 173 NG/ML
GLUCOSE SERPL-MCNC: 99 MG/DL
HCT VFR BLD AUTO: 36.1 %
HGB BLD-MCNC: 11.7 G/DL
IRON SERPL-MCNC: 118 UG/DL
LYMPHOCYTES # BLD AUTO: 2.1 K/UL
LYMPHOCYTES NFR BLD: 26.5 %
MCH RBC QN AUTO: 31 PG
MCHC RBC AUTO-ENTMCNC: 32.4 G/DL
MCV RBC AUTO: 96 FL
MONOCYTES # BLD AUTO: 0.7 K/UL
MONOCYTES NFR BLD: 8.3 %
NEUTROPHILS # BLD AUTO: 4.9 K/UL
NEUTROPHILS NFR BLD: 61.8 %
NRBC BLD-RTO: 0 /100 WBC
PHOSPHATE SERPL-MCNC: 4.6 MG/DL
PLATELET # BLD AUTO: 241 K/UL
PMV BLD AUTO: 11.6 FL
POTASSIUM SERPL-SCNC: 4.5 MMOL/L
PTH-INTACT SERPL-MCNC: 22 PG/ML
RBC # BLD AUTO: 3.78 M/UL
SATURATED IRON: 36 %
SODIUM SERPL-SCNC: 140 MMOL/L
TOTAL IRON BINDING CAPACITY: 326 UG/DL
TRANSFERRIN SERPL-MCNC: 220 MG/DL
WBC # BLD AUTO: 7.92 K/UL

## 2018-09-01 PROCEDURE — 80069 RENAL FUNCTION PANEL: CPT

## 2018-09-01 PROCEDURE — 83540 ASSAY OF IRON: CPT

## 2018-09-01 PROCEDURE — 36415 COLL VENOUS BLD VENIPUNCTURE: CPT

## 2018-09-01 PROCEDURE — 85025 COMPLETE CBC W/AUTO DIFF WBC: CPT

## 2018-09-01 PROCEDURE — 83970 ASSAY OF PARATHORMONE: CPT

## 2018-09-01 PROCEDURE — 82306 VITAMIN D 25 HYDROXY: CPT

## 2018-09-01 PROCEDURE — 82728 ASSAY OF FERRITIN: CPT

## 2018-09-04 ENCOUNTER — TELEPHONE (OUTPATIENT)
Dept: NEPHROLOGY | Facility: CLINIC | Age: 78
End: 2018-09-04

## 2018-09-04 NOTE — TELEPHONE ENCOUNTER
MD Francie Diallo LPN  Please call the patient and tell her to stop her ergocalciferol and oral calcium supplements.    Called pt  and informed him of above message /verbalzied understanding

## 2018-09-20 ENCOUNTER — OFFICE VISIT (OUTPATIENT)
Dept: NEPHROLOGY | Facility: CLINIC | Age: 78
End: 2018-09-20
Payer: MEDICARE

## 2018-09-20 VITALS
DIASTOLIC BLOOD PRESSURE: 80 MMHG | HEIGHT: 67 IN | SYSTOLIC BLOOD PRESSURE: 120 MMHG | WEIGHT: 151.88 LBS | OXYGEN SATURATION: 98 % | BODY MASS INDEX: 23.84 KG/M2 | HEART RATE: 52 BPM

## 2018-09-20 DIAGNOSIS — N18.30 KIDNEY DISEASE, CHRONIC, STAGE III (GFR 30-59 ML/MIN): ICD-10-CM

## 2018-09-20 PROCEDURE — 99999 PR PBB SHADOW E&M-EST. PATIENT-LVL IV: CPT | Mod: PBBFAC,GC,, | Performed by: INTERNAL MEDICINE

## 2018-09-20 PROCEDURE — 99214 OFFICE O/P EST MOD 30 MIN: CPT | Mod: PBBFAC | Performed by: INTERNAL MEDICINE

## 2018-09-20 PROCEDURE — 99213 OFFICE O/P EST LOW 20 MIN: CPT | Mod: S$PBB,GC,, | Performed by: INTERNAL MEDICINE

## 2018-09-20 NOTE — ASSESSMENT & PLAN NOTE
1. CKD: stage III, +/- some progression since last year, but overall relatively stable, underlying etiology likely HTN     Lab Results   Component Value Date    CREATININE 1.6 (H) 09/01/2018     Protein Creatinine Ratios: negative, good prognosis    Prot/Creat Ratio, Ur   Date Value Ref Range Status   09/27/2017 Unable to calculate 0.00 - 0.20 Final   08/16/2017 0.06 0.00 - 0.20 Final   05/11/2017 0.04 0.00 - 0.20 Final     ·   ·   Acid-Base: not an issue  Lab Results   Component Value Date     09/01/2018    K 4.5 09/01/2018    CO2 27 09/01/2018     2. HTN: Blood pressures well controlled in the office today    3. Renal osteodystrophy: last PTH negative  Lab Results   Component Value Date    PTH 22.0 09/01/2018    CALCIUM 9.8 09/01/2018    PHOS 4.6 (H) 09/01/2018       4. Anemia: borderline, no iron deficiency, will monitor, PCP for CA screening (if felt to be needed)  Lab Results   Component Value Date    HGB 11.7 (L) 09/01/2018        5. DM:  Last HbA1C, not a diabetic  Lab Results   Component Value Date    HGBA1C 5.5 04/11/2018       6. Lipid management: reviewed, PCP managing  Lab Results   Component Value Date    LDLCALC 79.4 04/11/2018       7. ESRD planing: do not anticipate a need at this time    Follow up in one year with labs twice yearly    Patient seen with Dr Douglas

## 2018-09-20 NOTE — PROGRESS NOTES
Subjective:       Patient ID: Daniela Harris is a 78 y.o. 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 Patient has longstanding history of HTN, it is felt her CKD is from HTN.  Renal artery dopplers are negative, renin/aldosterone ratio and levels are negative. No recent hospitalization. Patient denies any recent illness, nausea, vomiting, diarrhea, decreased urine output or any blood on urine. Denies use of NSAIDs. Creatinine 1.6 on most recent labs (1.4 a year prior), may have been slightly dehydrated on this most recent lab check.      Review of Systems   Constitutional: Negative for chills, fatigue and fever.   Respiratory: Negative for chest tightness and shortness of breath.    Cardiovascular: Negative for chest pain and leg swelling.   Gastrointestinal: Negative for abdominal distention, abdominal pain, diarrhea and nausea.   Genitourinary: Negative for decreased urine volume, difficulty urinating, flank pain, frequency, hematuria and urgency.   Skin: Negative for rash.   Neurological: Negative for tremors, speech difficulty and weakness.       Objective:      Physical Exam   HENT:   Head: Atraumatic.   Neck: No JVD present.   Cardiovascular: Normal rate and regular rhythm.   Pulmonary/Chest: Effort normal and breath sounds normal.   Abdominal: Soft. She exhibits no distension.   Musculoskeletal: She exhibits no edema or tenderness.   Neurological: She is alert.   Skin: Skin is warm.   Psychiatric: She has a normal mood and affect.       Assessment & Plan:       Kidney disease, chronic, stage III (GFR 30-59 ml/min)  1. CKD: stage III, +/- some progression since last  year, but overall relatively stable, underlying etiology likely HTN     Lab Results   Component Value Date    CREATININE 1.6 (H) 09/01/2018     Protein Creatinine Ratios: negative, good prognosis    Prot/Creat Ratio, Ur   Date Value Ref Range Status   09/27/2017 Unable to calculate 0.00 - 0.20 Final   08/16/2017 0.06 0.00 - 0.20 Final   05/11/2017 0.04 0.00 - 0.20 Final     ·   ·   Acid-Base: not an issue  Lab Results   Component Value Date     09/01/2018    K 4.5 09/01/2018    CO2 27 09/01/2018     2. HTN: Blood pressures well controlled in the office today    3. Renal osteodystrophy: last PTH negative  Lab Results   Component Value Date    PTH 22.0 09/01/2018    CALCIUM 9.8 09/01/2018    PHOS 4.6 (H) 09/01/2018       4. Anemia: borderline, no iron deficiency, will monitor, PCP for CA screening (if felt to be needed)  Lab Results   Component Value Date    HGB 11.7 (L) 09/01/2018        5. DM:  Last HbA1C, not a diabetic  Lab Results   Component Value Date    HGBA1C 5.5 04/11/2018       6. Lipid management: reviewed, PCP managing  Lab Results   Component Value Date    LDLCALC 79.4 04/11/2018       7. ESRD planing: do not anticipate a need at this time    Follow up in one year with labs twice yearly    Patient seen with Dr Douglas

## 2018-09-27 NOTE — PROGRESS NOTES
[facility] NEPHROLOGY STAFF NOTE    The note from the fellow/resident was reviewed. I have personally interviewed and examined the patient. There were no additional findings with regards to the history or physical exam.    I agree with the assessment and plan of  Dr. Ric Vo

## 2018-10-05 ENCOUNTER — OFFICE VISIT (OUTPATIENT)
Dept: OPTOMETRY | Facility: CLINIC | Age: 78
End: 2018-10-05
Payer: MEDICARE

## 2018-10-05 DIAGNOSIS — Z96.1 PSEUDOPHAKIA: ICD-10-CM

## 2018-10-05 DIAGNOSIS — H35.373 EPIRETINAL MEMBRANE (ERM) OF BOTH EYES: Primary | ICD-10-CM

## 2018-10-05 DIAGNOSIS — I10 HTN (HYPERTENSION), BENIGN: ICD-10-CM

## 2018-10-05 PROCEDURE — 99999 PR PBB SHADOW E&M-EST. PATIENT-LVL II: CPT | Mod: PBBFAC,,, | Performed by: OPTOMETRIST

## 2018-10-05 PROCEDURE — 99212 OFFICE O/P EST SF 10 MIN: CPT | Mod: PBBFAC,PO | Performed by: OPTOMETRIST

## 2018-10-05 PROCEDURE — 92014 COMPRE OPH EXAM EST PT 1/>: CPT | Mod: S$PBB,,, | Performed by: OPTOMETRIST

## 2018-10-05 NOTE — PROGRESS NOTES
Subjective:       Patient ID: Daniela Harris is a 78 y.o. female      Chief Complaint   Patient presents with    Concerns About Ocular Health    Hypertensive Eye Exam     History of Present Illness  Dls: 10/4/17 Dr. Johnson    77 y/o female presents today for hypertensive eye exam.   Pt states no changes in vision. Pt wears single vision glasses for reading.     + tearing ou   No itching  No burning  No pain  No ha's  No floaters  No flashes     Eye meds  None    Assessment/Plan:     1. Epiretinal membrane (ERM) of both eyes  Stable. Monitor.    2. HTN (hypertension), benign  No hypertensive retinopathy. Continue BP control. RTC 1 year for DFE.    3. Pseudophakia - Both Eyes  Well-centered. Clear.   Declined Mrx. Continue with readers.    Follow-up in about 1 year (around 10/5/2019).

## 2018-10-24 ENCOUNTER — OFFICE VISIT (OUTPATIENT)
Dept: INTERNAL MEDICINE | Facility: CLINIC | Age: 78
End: 2018-10-24
Payer: MEDICARE

## 2018-10-24 DIAGNOSIS — I10 HYPERTENSION, UNSPECIFIED TYPE: Primary | ICD-10-CM

## 2018-10-24 PROCEDURE — 99214 OFFICE O/P EST MOD 30 MIN: CPT | Mod: S$PBB,,, | Performed by: INTERNAL MEDICINE

## 2018-10-24 PROCEDURE — 99999 PR PBB SHADOW E&M-EST. PATIENT-LVL IV: CPT | Mod: PBBFAC,,, | Performed by: INTERNAL MEDICINE

## 2018-10-24 PROCEDURE — 99214 OFFICE O/P EST MOD 30 MIN: CPT | Mod: PBBFAC | Performed by: INTERNAL MEDICINE

## 2018-10-24 RX ORDER — TRIAMTERENE AND HYDROCHLOROTHIAZIDE 37.5; 25 MG/1; MG/1
1 CAPSULE ORAL EVERY MORNING
Qty: 30 CAPSULE | Refills: 1 | Status: SHIPPED | OUTPATIENT
Start: 2018-10-24 | End: 2019-02-12 | Stop reason: SDUPTHER

## 2018-10-24 RX ORDER — TRIAMTERENE AND HYDROCHLOROTHIAZIDE 37.5; 25 MG/1; MG/1
1 CAPSULE ORAL EVERY MORNING
Qty: 30 CAPSULE | Refills: 1 | Status: SHIPPED | OUTPATIENT
Start: 2018-10-24 | End: 2018-10-24 | Stop reason: SDUPTHER

## 2018-10-25 RX ORDER — LEVOTHYROXINE SODIUM 75 UG/1
75 TABLET ORAL
Qty: 90 TABLET | Refills: 1 | Status: SHIPPED | OUTPATIENT
Start: 2018-10-25 | End: 2019-02-12 | Stop reason: SDUPTHER

## 2018-10-28 VITALS
DIASTOLIC BLOOD PRESSURE: 94 MMHG | WEIGHT: 155.44 LBS | HEIGHT: 65 IN | BODY MASS INDEX: 25.9 KG/M2 | HEART RATE: 64 BPM | SYSTOLIC BLOOD PRESSURE: 142 MMHG

## 2018-10-28 NOTE — PROGRESS NOTES
Subjective:       Patient ID: Daniela Harris is a 78 y.o. female.    Chief Complaint: Hypertension    HPI  She returns for management of hypertension.  She has had hypertension for over a year.  Current treatment has included medications outlined in medication list.  She denies chest pain or shortness of breath.  No palpitations.  Denies left arm or neck pain.    Past medical history:  Hypertension, peptic ulcer disease, hypothyroidism, hyperlipidemia, osteopenia, vitamin-D deficiency, dementia, anemia.  She had a colonoscopy December 2014    Medications:  Norvasc 10 mg daily, Lipitor 80 mg daily, clonidine 0.1 mg b.i.d., Synthroid 0.075 mg daily, lisinopril 20 mg daily, Namenda    Allergies:  Keflex      Review of Systems   Constitutional: Negative for chills, fatigue, fever and unexpected weight change.   Respiratory: Negative for chest tightness and shortness of breath.    Cardiovascular: Negative for chest pain and palpitations.   Gastrointestinal: Negative for abdominal pain and blood in stool.   Neurological: Negative for dizziness, syncope, numbness and headaches.       Objective:      Physical Exam   HENT:   Right Ear: External ear normal.   Left Ear: External ear normal.   Nose: Nose normal.   Mouth/Throat: Oropharynx is clear and moist.   Eyes: Pupils are equal, round, and reactive to light.   Neck: Normal range of motion.   Cardiovascular: Normal rate and regular rhythm.   No murmur heard.  Pulmonary/Chest: Breath sounds normal.   Abdominal: She exhibits no distension. There is no hepatosplenomegaly. There is no tenderness.   Lymphadenopathy:     She has no cervical adenopathy.     She has no axillary adenopathy.   Neurological: She has normal strength and normal reflexes. No cranial nerve deficit or sensory deficit.       Assessment/Plan       Assessment and plan:  Hypertension:  Discontinue Norvasc.  Start Dyazide 1 p.o. daily.  Return to clinic in 1 month blood pressure check.  Discussed bone density,  Pap smear, pelvic exam.  She declined all

## 2018-11-05 RX ORDER — FERROUS GLUCONATE 324(38)MG
1 TABLET ORAL 2 TIMES DAILY WITH MEALS
Qty: 60 TABLET | Refills: 3 | Status: SHIPPED | OUTPATIENT
Start: 2018-11-05 | End: 2019-02-12 | Stop reason: SDUPTHER

## 2018-11-12 ENCOUNTER — TELEPHONE (OUTPATIENT)
Dept: INTERNAL MEDICINE | Facility: CLINIC | Age: 78
End: 2018-11-12

## 2018-11-12 NOTE — TELEPHONE ENCOUNTER
----- Message from Austyn Arvizu sent at 11/12/2018  9:31 AM CST -----  Contact: Flako/ Batsheva [phone]  Is this a refill or new RX:  Refill    RX name and strength: ferrous gluconate (FERGON) 324 MG tablet    Pharmacy name and phone # [id number] - [place], [place] - [address] AT [address]  [phone] (Phone) [phone] (Fax)

## 2018-12-06 ENCOUNTER — OFFICE VISIT (OUTPATIENT)
Dept: NEUROLOGY | Facility: CLINIC | Age: 78
End: 2018-12-06
Payer: MEDICARE

## 2018-12-06 VITALS
SYSTOLIC BLOOD PRESSURE: 141 MMHG | BODY MASS INDEX: 24.94 KG/M2 | WEIGHT: 149.69 LBS | HEIGHT: 65 IN | DIASTOLIC BLOOD PRESSURE: 61 MMHG | HEART RATE: 57 BPM

## 2018-12-06 DIAGNOSIS — N18.30 KIDNEY DISEASE, CHRONIC, STAGE III (GFR 30-59 ML/MIN): ICD-10-CM

## 2018-12-06 DIAGNOSIS — D50.0 IRON DEFICIENCY ANEMIA DUE TO CHRONIC BLOOD LOSS: ICD-10-CM

## 2018-12-06 DIAGNOSIS — F02.80 LATE ONSET ALZHEIMER'S DISEASE WITHOUT BEHAVIORAL DISTURBANCE: Primary | ICD-10-CM

## 2018-12-06 DIAGNOSIS — I10 HTN (HYPERTENSION), BENIGN: ICD-10-CM

## 2018-12-06 DIAGNOSIS — I67.2 CEREBRAL ATHEROSCLEROSIS: ICD-10-CM

## 2018-12-06 DIAGNOSIS — E55.9 VITAMIN D DEFICIENCY DISEASE: ICD-10-CM

## 2018-12-06 DIAGNOSIS — D51.9 ANEMIA DUE TO VITAMIN B12 DEFICIENCY, UNSPECIFIED B12 DEFICIENCY TYPE: ICD-10-CM

## 2018-12-06 DIAGNOSIS — G30.1 LATE ONSET ALZHEIMER'S DISEASE WITHOUT BEHAVIORAL DISTURBANCE: Primary | ICD-10-CM

## 2018-12-06 DIAGNOSIS — E78.00 PURE HYPERCHOLESTEROLEMIA: ICD-10-CM

## 2018-12-06 PROCEDURE — 99999 PR PBB SHADOW E&M-EST. PATIENT-LVL III: CPT | Mod: PBBFAC,,, | Performed by: NURSE PRACTITIONER

## 2018-12-06 PROCEDURE — 99215 OFFICE O/P EST HI 40 MIN: CPT | Mod: S$PBB,,, | Performed by: NURSE PRACTITIONER

## 2018-12-06 PROCEDURE — 99213 OFFICE O/P EST LOW 20 MIN: CPT | Mod: PBBFAC | Performed by: NURSE PRACTITIONER

## 2018-12-06 RX ORDER — MEMANTINE HYDROCHLORIDE 10 MG/1
TABLET ORAL
Qty: 180 TABLET | Refills: 3 | Status: SHIPPED | OUTPATIENT
Start: 2018-12-06 | End: 2019-02-12 | Stop reason: SDUPTHER

## 2018-12-06 NOTE — LETTER
December 6, 2018      Precious Clemente MD  [address]  [place] [address]           [facility]  [address]  [place] [address]  Phone: [phone]  Fax: [phone]          Patient: Daniela Harris   MR Number: [id number]   YOB: 1940   Date of Visit: 12/6/2018       Dear Dr. Precious Clemente:    Thank you for referring Daniela Harris to me for evaluation. Attached you will find relevant portions of my assessment and plan of care.    If you have questions, please do not hesitate to call me. I look forward to following Daniela Harris along with you.    Sincerely,    Elsy Lovelace, BRANDY    Enclosure  CC:  No Recipients    If you would like to receive this communication electronically, please contact [email or web address] or [phone] to request more information on [employer] Link access.    For providers and/or their staff who would like to refer a patient to [facility], please contact us through our one-stop-shop provider referral line, [facility], at [phone].    If you feel you have received this communication in error or would no longer like to receive these types of communications, please e-mail [email or web address]

## 2018-12-06 NOTE — PROGRESS NOTES
"Name: Daniela Harris  MRN: [id number]   CSN: [id number]      Date: 12-6-18      Referring physician:  Precious Clemente MD  [address] [place] [address]    Subjective:      Chief Complaint: memory     History of Present Illness (HPI):    Daniela Harris is a 78 y.o.  female who presents today for a follow-up evaluation of Dementia and is accompanied by . Last seen in office 8-31-17. At that time, memantine was increased from 5 mg BID to 10 mg BID. She is mary well.     She laughs frequently. When asked about her memory, she says she can "let it  go and does not do end in here."     Sleep is very good. No RBD.   Appetite not as good. She says she likes losing weight because she can walk around the house better.    does all the cooking or they eat out a lot.   She acknowledges that she does not drive. They go to [employer] everyday.   Mood is very good. She says she is happy.   She does laundry and makes the bed every day.   Independent with feeding, dressing, and bathing. Has her hair done weekly at [facility].   They have two dogs that she enjoys.    He manages the finances.     They have someone come to the house monthly for cleaning.       Review of Systems   Unable to perform ROS: Dementia       Past Medical History: The patient  has a past medical history of After-cataract, obscuring vision - Left Eye (2/28/2014), Arthritis, Diverticulitis, Goiter, History of appendectomy, Hyperlipidemia, Hypothyroidism, Osteopenia, Other and unspecified hyperlipidemia, Peptic ulcer, Psoriasis, Stroke, Unspecified essential hypertension, Unspecified vitamin D deficiency, and Vitamin B 12 deficiency.    Social History: The patient  reports that  has never smoked. she has never used smokeless tobacco. She reports that she does not drink alcohol or use drugs.    Family History: Their family history includes Alzheimer's disease in her father; Cataracts in her father; Dementia in her father and maternal uncle; Diabetes in " her mother and sister; Hyperlipidemia in her sister; Hypertension in her father, sister, and sister; No Known Problems in her brother, maternal aunt, maternal grandfather, paternal aunt, paternal grandfather, paternal grandmother, and paternal uncle; Parkinsonism in her sister; Thyroid disease in her cousin, maternal grandmother, and sister.    Allergies: Keflex  [cephalexin]     Meds:   Current Outpatient Medications on File Prior to Visit   Medication Sig Dispense Refill    ascorbic acid (VITAMIN C) 500 MG tablet Take 500 mg by mouth. 1 Tablet Oral Every day      atorvastatin (LIPITOR) 80 MG tablet take 1 tablet by mouth once daily 90 tablet 3    atorvastatin (LIPITOR) 80 MG tablet take 1 tablet by mouth once daily 90 tablet 3    b complex vitamins (VITAMINS B COMPLEX) tablet Take 1 tablet by mouth once daily. 1 Tablet Oral Every day 30 tablet 6    CALCIUM CARBONATE (CALCIUM 600 ORAL) Take 600 mg by mouth. 1  By mouth Every day      cloNIDine (CATAPRES) 0.1 MG tablet take 1 tablet by mouth twice a day 60 tablet 11    ferrous gluconate (FERGON) 324 MG tablet Take 1 tablet (324 mg total) by mouth 2 (two) times daily with meals. 60 tablet 3    FLUAD 9417-4380, 65 YR UP,,PF, 45 mcg (15 mcg x 3)/0.5 mL Syrg inject 0.5 milliliter intramuscularly  0    FLUZONE HIGH-DOSE 2018-19, PF, 180 mcg/0.5 mL vaccine ADM 0.5ML IM UTD  0    glucosamine & chondroit sul.Na 750-400 mg Cmpk Take by mouth. 1  By mouth Every day      levothyroxine (SYNTHROID) 75 MCG tablet Take 1 tablet (75 mcg total) by mouth after dinner. 90 tablet 1    lisinopril (PRINIVIL,ZESTRIL) 20 MG tablet Take 1 tablet (20 mg total) by mouth once daily. 90 tablet 0    multivitamin (THERAGRAN) per tablet Take by mouth. 1 Tablet Oral Every day      pantoprazole (PROTONIX) 40 MG tablet take 1 tablet by mouth once daily 30 tablet 11    triamterene-hydrochlorothiazide 37.5-25 mg (DYAZIDE) 37.5-25 mg per capsule TAKE 1 CAPSULE BY MOUTH EVERY MORNING 30  "capsule 1    [DISCONTINUED] memantine (NAMENDA) 10 MG Tab 1 tablet twice daily as directed 60 tablet 11    aspirin (ECOTRIN) 81 MG EC tablet Take 1 tablet (81 mg total) by mouth once daily. 30 tablet 5     Current Facility-Administered Medications on File Prior to Visit   Medication Dose Route Frequency Provider Last Rate Last Dose    cloNIDine tablet 0.1 mg  0.1 mg Oral 1 time in Clinic/HOD Pranay Brown MD           Objective:     Physical Exam:    Vitals:    12/06/18 1355   BP: (!) 141/61   Pulse: (!) 57   Weight: 67.9 kg (149 lb 11.1 oz)   Height: 5' 5" (1.651 m)     Body mass index is 24.91 kg/m².    Constitutional  Well-developed, well-nourished, well-groomed, appears stated age   Cardiovascular  Radial pulses 2+ and symmetric, no LE edema bilaterally     ..  Neurological    * Mental status  MOCA = 2/8 attempted  Begins to get agitated with questions and says that stuff is not important to her   - Orientation Oriented to: person and situation  Not oriented to: place, day of week, month of year, year, president.   When asked which holiday is next, she says the one for kids. When pressed further, she says it happens in the winter. She is becoming slt irritated but still cooperative.      - Memory     Impaired     - Attention/concentration  Easily distracted     - Language  perseveration and tangential- very talkative, laughs frequently     - Fund of knowledge  Impaired     - Executive  Impaired             ..  * Cranial nerves       - CN III, IV, VI  Extraocular movements full, normal pursuits and saccades     - CN V  Sensation V1 - V3 intact     - CN VII  Face strong and symmetric bilaterally     - CN VIII  Hearing intact bilaterally     - CN IX, X  Palate raises midline and symmetric     - CN XI  SCM and trapezius 5/5 bilaterally     - CN XII  Tongue midline   * Motor  Muscle bulk normal, strength 5/5 throughout   * Coordination  No dysmetria with finger-to-nose   * Gait  Normal and steady "       Laboratory Results:  No visits with results within 3 Month(s) from this visit.   Latest known visit with results is:   Lab Visit on 09/01/2018   Component Date Value Ref Range Status    Specimen UA 09/01/2018 Urine, Clean Catch   Final    Color, UA 09/01/2018 Ofe  Yellow, Straw, Ofe Final    Appearance, UA 09/01/2018 Hazy* Clear Final    pH, UA 09/01/2018 5.0  5.0 - 8.0 Final    Specific Gravity, UA 09/01/2018 1.020  1.005 - 1.030 Final    Protein, UA 09/01/2018 Negative  Negative Final    Glucose, UA 09/01/2018 Negative  Negative Final    Ketones, UA 09/01/2018 Negative  Negative Final    Bilirubin (UA) 09/01/2018 Negative  Negative Final    Occult Blood UA 09/01/2018 Negative  Negative Final    Nitrite, UA 09/01/2018 Negative  Negative Final    Urobilinogen, UA 09/01/2018 Negative  <2.0 EU/dL Final    Leukocytes, UA 09/01/2018 2+* Negative Final    RBC, UA 09/01/2018 2  0 - 4 /hpf Final    WBC, UA 09/01/2018 11* 0 - 5 /hpf Final    Bacteria, UA 09/01/2018 Rare  None-Occ /hpf Final    Squam Epithel, UA 09/01/2018 6  /hpf Final    Microscopic Comment 09/01/2018 SEE COMMENT   Final       Imaging:    Impression      Cortical band of diffusion and flair signal hyperintensity right occipital lobe with question underlying component of gradient susceptibility.  While overall nonspecific concern for possible encephalitis versus sequela of seizure activity.  Small   hemorrhagic contusion as a sequela of recent trauma to be considered with recent ischemia/infarction felt to be much less likely.    Superimposed age-appropriate generalized volume loss with small remote left inferior frontal infarction and scattered small foci of T2/FLAIR signal abnormality within the supratentorial white matter  while nonspecific suggestive for moderate degree of    chronic microvascular ischemic change.    No evidence for acute infarction or enhancing lesion.      Electronically signed by: YOJANA SAUCEDO DO  Date:  12/03/14         Assessment and Plan     Late onset Alzheimer's disease without behavioral disturbance  -     memantine (NAMENDA) 10 MG Tab; 1 tablet twice daily as directed  Dispense: 180 tablet; Refill: 3    Anemia due to vitamin B12 deficiency, unspecified B12 deficiency type    Iron deficiency anemia due to chronic blood loss    Vitamin D deficiency disease    HTN (hypertension), benign    Pure hypercholesterolemia    Kidney disease, chronic, stage III (GFR 30-59 ml/min)    Cerebral atherosclerosis        Medical Decision Making:    Continue memantine 10 mg BID.  Intolerant to donepezil in the past.   Next visit, will obtain B12 level as hx of deficiency in the past.   Call for problems.  Follow up 6 months in [facility]. I doubt that she will agree to testing but would like the opportunity to speak to  to assure he has help he needs for her continued care.   At this point, he does not endorse any issues.       I spent 40 minutes face-to-face with the patient and  with >50% of the time spent with counseling and education regarding:  - results of data, diagnosis, and recommendations stated above  - the prognosis of memory loss  - risks and benefits of memory meds   - importance of diet and exercise    Elsy Lovelace, DNP, NP-C  Division of Movement and Memory Disorders  [facility]  [phone]

## 2018-12-12 ENCOUNTER — OFFICE VISIT (OUTPATIENT)
Dept: INTERNAL MEDICINE | Facility: CLINIC | Age: 78
End: 2018-12-12
Payer: MEDICARE

## 2018-12-12 ENCOUNTER — LAB VISIT (OUTPATIENT)
Dept: LAB | Facility: HOSPITAL | Age: 78
End: 2018-12-12
Attending: INTERNAL MEDICINE
Payer: MEDICARE

## 2018-12-12 DIAGNOSIS — I10 HYPERTENSION, UNSPECIFIED TYPE: ICD-10-CM

## 2018-12-12 DIAGNOSIS — F03.90 DEMENTIA WITHOUT BEHAVIORAL DISTURBANCE, UNSPECIFIED DEMENTIA TYPE: ICD-10-CM

## 2018-12-12 DIAGNOSIS — F03.90 DEMENTIA WITHOUT BEHAVIORAL DISTURBANCE, UNSPECIFIED DEMENTIA TYPE: Primary | ICD-10-CM

## 2018-12-12 LAB
ALBUMIN SERPL BCP-MCNC: 4.5 G/DL
ALP SERPL-CCNC: 71 U/L
ALT SERPL W/O P-5'-P-CCNC: 42 U/L
ANION GAP SERPL CALC-SCNC: 8 MMOL/L
AST SERPL-CCNC: 33 U/L
BILIRUB SERPL-MCNC: 0.6 MG/DL
BUN SERPL-MCNC: 39 MG/DL
CALCIUM SERPL-MCNC: 9.5 MG/DL
CHLORIDE SERPL-SCNC: 105 MMOL/L
CO2 SERPL-SCNC: 28 MMOL/L
CREAT SERPL-MCNC: 2.3 MG/DL
EST. GFR  (AFRICAN AMERICAN): 22 ML/MIN/1.73 M^2
EST. GFR  (NON AFRICAN AMERICAN): 19 ML/MIN/1.73 M^2
FOLATE SERPL-MCNC: 19.6 NG/ML
GLUCOSE SERPL-MCNC: 89 MG/DL
POTASSIUM SERPL-SCNC: 4.6 MMOL/L
PROT SERPL-MCNC: 8.1 G/DL
SODIUM SERPL-SCNC: 141 MMOL/L
VIT B12 SERPL-MCNC: >2000 PG/ML

## 2018-12-12 PROCEDURE — 82607 VITAMIN B-12: CPT

## 2018-12-12 PROCEDURE — 99999 PR PBB SHADOW E&M-EST. PATIENT-LVL V: CPT | Mod: PBBFAC,,, | Performed by: INTERNAL MEDICINE

## 2018-12-12 PROCEDURE — 82746 ASSAY OF FOLIC ACID SERUM: CPT

## 2018-12-12 PROCEDURE — 80053 COMPREHEN METABOLIC PANEL: CPT

## 2018-12-12 PROCEDURE — 36415 COLL VENOUS BLD VENIPUNCTURE: CPT

## 2018-12-12 PROCEDURE — 99214 OFFICE O/P EST MOD 30 MIN: CPT | Mod: S$PBB,,, | Performed by: INTERNAL MEDICINE

## 2018-12-12 PROCEDURE — 99215 OFFICE O/P EST HI 40 MIN: CPT | Mod: PBBFAC | Performed by: INTERNAL MEDICINE

## 2018-12-12 RX ORDER — AMLODIPINE BESYLATE 10 MG/1
10 TABLET ORAL DAILY
Refills: 4 | COMMUNITY
Start: 2018-11-01 | End: 2018-12-12 | Stop reason: ALTCHOICE

## 2018-12-15 ENCOUNTER — TELEPHONE (OUTPATIENT)
Dept: INTERNAL MEDICINE | Facility: CLINIC | Age: 78
End: 2018-12-15

## 2018-12-15 DIAGNOSIS — N18.9 CHRONIC RENAL IMPAIRMENT, UNSPECIFIED CKD STAGE: Primary | ICD-10-CM

## 2018-12-16 VITALS
BODY MASS INDEX: 21.49 KG/M2 | WEIGHT: 150.13 LBS | SYSTOLIC BLOOD PRESSURE: 132 MMHG | HEART RATE: 62 BPM | OXYGEN SATURATION: 98 % | DIASTOLIC BLOOD PRESSURE: 60 MMHG | HEIGHT: 70 IN

## 2018-12-17 NOTE — TELEPHONE ENCOUNTER
Spoke with pt to let her know that she will be put on the waiting list and someone will call her from Nephrology. They were no radha. Available.

## 2018-12-17 NOTE — PROGRESS NOTES
Subjective:       Patient ID: Daniela Harris is a 78 y.o. female.    Chief Complaint: Hypertension    HPI  She returns for management of hypertension.  She has had hypertension for over a year.  Current treatment has included medications outlined in medication list.  She denies chest pain or shortness of breath.  No palpitations.  Denies left arm or neck pain.    Medications:  See med list    Social history:  Does not smoke, does not drink alcohol      Review of Systems   Constitutional: Negative for chills, fatigue, fever and unexpected weight change.   Respiratory: Negative for chest tightness and shortness of breath.    Cardiovascular: Negative for chest pain and palpitations.   Gastrointestinal: Negative for abdominal pain and blood in stool.   Neurological: Negative for dizziness, syncope, numbness and headaches.       Objective:      Physical Exam   HENT:   Right Ear: External ear normal.   Left Ear: External ear normal.   Nose: Nose normal.   Mouth/Throat: Oropharynx is clear and moist.   Eyes: Pupils are equal, round, and reactive to light.   Neck: Normal range of motion.   Cardiovascular: Normal rate and regular rhythm.   No murmur heard.  Pulmonary/Chest: Breath sounds normal.   Abdominal: She exhibits no distension. There is no hepatosplenomegaly. There is no tenderness.   Lymphadenopathy:     She has no cervical adenopathy.     She has no axillary adenopathy.   Neurological: She has normal strength and normal reflexes. No cranial nerve deficit or sensory deficit.       Assessment/Plan       Assessment and plan:  Hypertension:  Check CMP, B12, folate

## 2018-12-18 ENCOUNTER — OUTPATIENT CASE MANAGEMENT (OUTPATIENT)
Dept: ADMINISTRATIVE | Facility: OTHER | Age: 78
End: 2018-12-18

## 2018-12-18 NOTE — PROGRESS NOTES
Thank you for the referral.  Patient has been assigned to Valery Cesar LMSW for low risk screening for Outpatient Case Management.     Reason for referral: Dementia without behavioral disturbance, unspecified dementia type    Please contact [facility] at ext. [phone] with any questions.    Thank you,    Kaila Dumont, [facility]  Outpatient Care Mgmt.  [phone]

## 2018-12-19 ENCOUNTER — OUTPATIENT CASE MANAGEMENT (OUTPATIENT)
Dept: ADMINISTRATIVE | Facility: OTHER | Age: 78
End: 2018-12-19

## 2018-12-19 NOTE — PROGRESS NOTES
This LMSW contacted patient and caregiver regarding referral. Patient reports she is fine. Patient reports no needs . LMSW ask patient may she speak with her spouse. Patient placed spouse on the phone. Spouse reports no needs at this time. Spouse  denied to complete SW assessment.  LMSW will close case at this time.

## 2019-01-07 ENCOUNTER — TELEPHONE (OUTPATIENT)
Dept: INTERNAL MEDICINE | Facility: CLINIC | Age: 79
End: 2019-01-07

## 2019-01-07 DIAGNOSIS — N18.9 CHRONIC RENAL IMPAIRMENT, UNSPECIFIED CKD STAGE: Primary | ICD-10-CM

## 2019-01-07 RX ORDER — LISINOPRIL 20 MG/1
TABLET ORAL
Qty: 90 TABLET | Refills: 0 | Status: SHIPPED | OUTPATIENT
Start: 2019-01-07 | End: 2019-02-12 | Stop reason: SDUPTHER

## 2019-01-08 NOTE — TELEPHONE ENCOUNTER
Spoke with Sada from Nephrology and we scheduled appointment for Mrs. Harris, called pt and informed them of the radha. And that a letter will be mailed out to them.

## 2019-02-04 ENCOUNTER — PATIENT MESSAGE (OUTPATIENT)
Dept: NEPHROLOGY | Facility: CLINIC | Age: 79
End: 2019-02-04

## 2019-02-05 ENCOUNTER — LAB VISIT (OUTPATIENT)
Dept: LAB | Facility: HOSPITAL | Age: 79
End: 2019-02-05
Attending: INTERNAL MEDICINE
Payer: MEDICARE

## 2019-02-05 DIAGNOSIS — N18.30 KIDNEY DISEASE, CHRONIC, STAGE III (GFR 30-59 ML/MIN): ICD-10-CM

## 2019-02-05 LAB
ALBUMIN SERPL BCP-MCNC: 3.8 G/DL
ANION GAP SERPL CALC-SCNC: 8 MMOL/L
BACTERIA #/AREA URNS HPF: ABNORMAL /HPF
BILIRUB UR QL STRIP: NEGATIVE
BUN SERPL-MCNC: 71 MG/DL
CALCIUM SERPL-MCNC: 9 MG/DL
CHLORIDE SERPL-SCNC: 109 MMOL/L
CLARITY UR: CLEAR
CO2 SERPL-SCNC: 23 MMOL/L
COLOR UR: YELLOW
CREAT SERPL-MCNC: 2.7 MG/DL
EST. GFR  (AFRICAN AMERICAN): 19 ML/MIN/1.73 M^2
EST. GFR  (NON AFRICAN AMERICAN): 16 ML/MIN/1.73 M^2
GLUCOSE SERPL-MCNC: 100 MG/DL
GLUCOSE UR QL STRIP: NEGATIVE
HGB UR QL STRIP: NEGATIVE
KETONES UR QL STRIP: NEGATIVE
LEUKOCYTE ESTERASE UR QL STRIP: ABNORMAL
MICROSCOPIC COMMENT: ABNORMAL
NITRITE UR QL STRIP: NEGATIVE
PH UR STRIP: 6 [PH] (ref 5–8)
PHOSPHATE SERPL-MCNC: 4.5 MG/DL
POTASSIUM SERPL-SCNC: 5.3 MMOL/L
PROT UR QL STRIP: NEGATIVE
RBC #/AREA URNS HPF: 0 /HPF (ref 0–4)
SODIUM SERPL-SCNC: 140 MMOL/L
SP GR UR STRIP: 1.01 (ref 1–1.03)
SQUAMOUS #/AREA URNS HPF: 15 /HPF
URN SPEC COLLECT METH UR: ABNORMAL
UROBILINOGEN UR STRIP-ACNC: NEGATIVE EU/DL
WBC #/AREA URNS HPF: 10 /HPF (ref 0–5)

## 2019-02-05 PROCEDURE — 80069 RENAL FUNCTION PANEL: CPT

## 2019-02-05 PROCEDURE — 81000 URINALYSIS NONAUTO W/SCOPE: CPT

## 2019-02-05 PROCEDURE — 36415 COLL VENOUS BLD VENIPUNCTURE: CPT

## 2019-02-06 ENCOUNTER — PES CALL (OUTPATIENT)
Dept: ADMINISTRATIVE | Facility: CLINIC | Age: 79
End: 2019-02-06

## 2019-02-12 ENCOUNTER — PATIENT MESSAGE (OUTPATIENT)
Dept: OPTOMETRY | Facility: CLINIC | Age: 79
End: 2019-02-12

## 2019-02-12 ENCOUNTER — LAB VISIT (OUTPATIENT)
Dept: LAB | Facility: HOSPITAL | Age: 79
End: 2019-02-12
Payer: MEDICARE

## 2019-02-12 ENCOUNTER — TELEPHONE (OUTPATIENT)
Dept: INTERNAL MEDICINE | Facility: CLINIC | Age: 79
End: 2019-02-12

## 2019-02-12 ENCOUNTER — PATIENT MESSAGE (OUTPATIENT)
Dept: INTERNAL MEDICINE | Facility: CLINIC | Age: 79
End: 2019-02-12

## 2019-02-12 ENCOUNTER — PATIENT MESSAGE (OUTPATIENT)
Dept: NEPHROLOGY | Facility: CLINIC | Age: 79
End: 2019-02-12

## 2019-02-12 ENCOUNTER — OFFICE VISIT (OUTPATIENT)
Dept: NEPHROLOGY | Facility: CLINIC | Age: 79
End: 2019-02-12
Payer: MEDICARE

## 2019-02-12 ENCOUNTER — PATIENT MESSAGE (OUTPATIENT)
Dept: NEUROLOGY | Facility: CLINIC | Age: 79
End: 2019-02-12

## 2019-02-12 VITALS
HEIGHT: 70 IN | DIASTOLIC BLOOD PRESSURE: 54 MMHG | OXYGEN SATURATION: 100 % | SYSTOLIC BLOOD PRESSURE: 132 MMHG | HEART RATE: 51 BPM | BODY MASS INDEX: 21.54 KG/M2

## 2019-02-12 DIAGNOSIS — G30.1 LATE ONSET ALZHEIMER'S DISEASE WITHOUT BEHAVIORAL DISTURBANCE: ICD-10-CM

## 2019-02-12 DIAGNOSIS — I10 HTN (HYPERTENSION), BENIGN: ICD-10-CM

## 2019-02-12 DIAGNOSIS — N18.30 KIDNEY DISEASE, CHRONIC, STAGE III (GFR 30-59 ML/MIN): ICD-10-CM

## 2019-02-12 DIAGNOSIS — N17.9 AKI (ACUTE KIDNEY INJURY): ICD-10-CM

## 2019-02-12 DIAGNOSIS — F02.80 LATE ONSET ALZHEIMER'S DISEASE WITHOUT BEHAVIORAL DISTURBANCE: ICD-10-CM

## 2019-02-12 DIAGNOSIS — E78.5 HYPERLIPIDEMIA, UNSPECIFIED HYPERLIPIDEMIA TYPE: ICD-10-CM

## 2019-02-12 DIAGNOSIS — I63.9 ACUTE ISCHEMIC STROKE: ICD-10-CM

## 2019-02-12 DIAGNOSIS — N18.30 KIDNEY DISEASE, CHRONIC, STAGE III (GFR 30-59 ML/MIN): Primary | ICD-10-CM

## 2019-02-12 DIAGNOSIS — I10 ESSENTIAL HYPERTENSION: ICD-10-CM

## 2019-02-12 LAB
CREAT UR-MCNC: 155 MG/DL
PROT UR-MCNC: 11 MG/DL
PROT/CREAT UR: 0.07 MG/G{CREAT}

## 2019-02-12 PROCEDURE — 99999 PR PBB SHADOW E&M-EST. PATIENT-LVL III: ICD-10-PCS | Mod: PBBFAC,,, | Performed by: INTERNAL MEDICINE

## 2019-02-12 PROCEDURE — 99213 OFFICE O/P EST LOW 20 MIN: CPT | Mod: PBBFAC | Performed by: INTERNAL MEDICINE

## 2019-02-12 PROCEDURE — 99999 PR PBB SHADOW E&M-EST. PATIENT-LVL III: CPT | Mod: PBBFAC,,, | Performed by: INTERNAL MEDICINE

## 2019-02-12 PROCEDURE — 99215 PR OFFICE/OUTPT VISIT, EST, LEVL V, 40-54 MIN: ICD-10-PCS | Mod: S$PBB,,, | Performed by: INTERNAL MEDICINE

## 2019-02-12 PROCEDURE — 84156 ASSAY OF PROTEIN URINE: CPT

## 2019-02-12 PROCEDURE — 99215 OFFICE O/P EST HI 40 MIN: CPT | Mod: S$PBB,,, | Performed by: INTERNAL MEDICINE

## 2019-02-12 RX ORDER — MEMANTINE HYDROCHLORIDE 10 MG/1
TABLET ORAL
Qty: 180 TABLET | Refills: 3 | Status: SHIPPED | OUTPATIENT
Start: 2019-02-12 | End: 2019-04-03 | Stop reason: SDUPTHER

## 2019-02-12 RX ORDER — FERROUS GLUCONATE 324(38)MG
1 TABLET ORAL 2 TIMES DAILY WITH MEALS
Qty: 60 TABLET | Refills: 3 | Status: SHIPPED | OUTPATIENT
Start: 2019-02-12 | End: 2019-06-03 | Stop reason: SDUPTHER

## 2019-02-12 RX ORDER — LEVOTHYROXINE SODIUM 75 UG/1
75 TABLET ORAL
Qty: 90 TABLET | Refills: 1 | Status: SHIPPED | OUTPATIENT
Start: 2019-02-12 | End: 2019-02-13 | Stop reason: SDUPTHER

## 2019-02-12 RX ORDER — LISINOPRIL 20 MG/1
20 TABLET ORAL DAILY
Qty: 90 TABLET | Refills: 0 | Status: SHIPPED | OUTPATIENT
Start: 2019-02-12 | End: 2019-02-13 | Stop reason: SDUPTHER

## 2019-02-12 RX ORDER — TRIAMTERENE AND HYDROCHLOROTHIAZIDE 37.5; 25 MG/1; MG/1
1 CAPSULE ORAL EVERY MORNING
Qty: 30 CAPSULE | Refills: 1 | Status: SHIPPED | OUTPATIENT
Start: 2019-02-12 | End: 2019-06-14 | Stop reason: SDUPTHER

## 2019-02-12 NOTE — LETTER
February 12, 2019      Precious Clemente MD  [address]  [place] [address]           [facility] Nephrology  [address]  [place] [address]  Phone: [phone]  Fax: [phone]          Patient: Daniela Harris   MR Number: [id number]   YOB: 1940   Date of Visit: 2/12/2019       Dear Dr. Precious Clemente:    Thank you for referring Daniela Harris to me for evaluation. Attached you will find relevant portions of my assessment and plan of care.    If you have questions, please do not hesitate to call me. I look forward to following Daniela Harris along with you.    Sincerely,    Dinah Nick,     Enclosure  CC:  No Recipients    If you would like to receive this communication electronically, please contact [email or web address] or [phone] to request more information on [employer] access.    For providers and/or their staff who would like to refer a patient to [facility], please contact us through our one-stop-shop provider referral line, [facility], at [phone].    If you feel you have received this communication in error or would no longer like to receive these types of communications, please e-mail [email or web address]

## 2019-02-12 NOTE — TELEPHONE ENCOUNTER
----- Message from Luann Arzate sent at 2/12/2019  9:26 AM CST -----  Contact: Vickie/ daughter  [phone]  Pt daughter Vickie would like to know have office fax over paper work to assistant living. Vickie state that is the only way pt can move in    Please advise

## 2019-02-12 NOTE — PROGRESS NOTES
Subjective:        FOLLOWUP VISIT    She was followed by Dr. Hayden and Dr. Douglas who is seen here for the   first time by me.  The patient was sent here for elevated creatinine.  She was   last seen in September 2018 by Dr. Hayden and Dr. Douglas.  Her baseline   creatinine appears to be 1.4-1.6, but in December, her creatinine was 2.3 and   currently elevated this month when it was checked at 2.7.  The patient states   that she is feeling well without any complaints.  She has good appetite.  No   major weight loss or weight gain.  According to her, she states that she does   not drink much water and barely drinks a bottle of water a day and some Coke.    She denies any NSAIDs.  She was encouraged to hydrate well.  Her potassium is   borderline at 5.3 with a creatinine of 2.7.  We will check an ultrasound, urine   labs, and renal function panel today.  I encouraged her to hydrate well.  I also   asked her to hold lisinopril.    PAST MEDICAL HISTORY:  Significant for CKD, hypertension, hypothyroidism,   hypercholesterolemia.    SOCIAL HISTORY:  Does not smoke, does not drink.    FAMILY HISTORY:  Her mom was on dialysis and it is unclear why her kidneys   failed.    REVIEW OF SYSTEMS:  The patient states that she is feeling well without any   complaints today.  She denies any frequency, urgency, hematuria, dysuria,   nausea, vomiting, chest pain, shortness of breath.  She denies any rashes.    PHYSICAL EXAMINATION:  GENERAL:  Well-nourished, well-developed individual, in no acute distress.  HEENT:  PERRLA, EOMI.  NECK:  No cervical lymphadenopathy.  CARDIOVASCULAR:  Rhythm regular.  No murmurs, gallops or rubs.  LUNGS:  Clear to auscultation bilaterally, no respiratory effort.  ABDOMEN:  Soft, nontender, nondistended.  No lower extremity edema.  MUSCULOSKELETAL:  Normal.  SKIN:  No rashes.    ASSESSMENT AND PLAN:  This is an 78-year-old white female with history of   hypertension and CKD, is coming in for  followup.  1.  CKD stage III with a baseline creatinine of 1.4-1.6, but since December   creatinine is elevated from 2.3-2.7 on the most recent labs.  She denies any   NSAIDs.  She denies any new medications or hospitalizations or any   over-the-counter medications.  She does state that she does not hydrate well.  I   encouraged her to hydrate well.  We will get a renal ultrasound.  Her blood   pressures are stable and she does not recall her blood pressures at home.  She   denies any nausea, vomiting or diarrhea.  2.  Hypertension.  Blood pressures are stable.  I did ask her to hold lisinopril   in light of her HENRIQUE.  We will check labs today.  3.  Anemia.  Hemoglobin is stable.  4.  Renal osteodystrophy.  We will check an intact PTH, calcium, phosphorus,   stable in the past, PTH stable, and she had no proteinuria in the past.  We will   recheck.  We will get a renal ultrasound.  She denies any uremic symptoms.  We   will recheck labs.      CHRISTIANE  dd: 02/12/2019 12:27:33 (CST)  td: 02/13/2019 08:51:46 (CST)  Doc ID   #[id number]  Job ID #[id number]    CC:     [id number]    Phone: [phone]

## 2019-02-13 ENCOUNTER — TELEPHONE (OUTPATIENT)
Dept: NEPHROLOGY | Facility: CLINIC | Age: 79
End: 2019-02-13

## 2019-02-13 DIAGNOSIS — N17.9 AKI (ACUTE KIDNEY INJURY): Primary | ICD-10-CM

## 2019-02-13 RX ORDER — LISINOPRIL 20 MG/1
20 TABLET ORAL DAILY
Qty: 90 TABLET | Refills: 0 | Status: SHIPPED | OUTPATIENT
Start: 2019-02-13 | End: 2019-06-07 | Stop reason: SDUPTHER

## 2019-02-13 RX ORDER — LEVOTHYROXINE SODIUM 75 UG/1
75 TABLET ORAL
Qty: 90 TABLET | Refills: 1 | Status: SHIPPED | OUTPATIENT
Start: 2019-02-13 | End: 2019-08-12 | Stop reason: SDUPTHER

## 2019-02-13 RX ORDER — ATORVASTATIN CALCIUM 80 MG/1
80 TABLET, FILM COATED ORAL DAILY
Qty: 90 TABLET | Refills: 0 | Status: SHIPPED | OUTPATIENT
Start: 2019-02-13 | End: 2019-04-03 | Stop reason: SDUPTHER

## 2019-02-13 RX ORDER — CLONIDINE HYDROCHLORIDE 0.1 MG/1
0.1 TABLET ORAL 2 TIMES DAILY
Qty: 60 TABLET | Refills: 3 | Status: SHIPPED | OUTPATIENT
Start: 2019-02-13 | End: 2019-07-05 | Stop reason: SDUPTHER

## 2019-02-13 NOTE — TELEPHONE ENCOUNTER
Please inform the pt that her creatinine is still high at 2.5 but slightly better from last time.  I would like her to hydrate well and get rfp next week and she needs a f/u with Dr. Hayden in the next few weeks.

## 2019-02-14 ENCOUNTER — TELEPHONE (OUTPATIENT)
Dept: INTERNAL MEDICINE | Facility: CLINIC | Age: 79
End: 2019-02-14

## 2019-02-14 NOTE — TELEPHONE ENCOUNTER
"----- Message from Viraj Eric sent at 2/14/2019  4:18 PM CST -----  Contact: Lisandra Sin [phone]  [facility] calling to check the status of the "Plan of Care" fax that was sent, patient will be moving into assisted home living tomorrow, another fax will be sent to office today requesting for response or returned signed forms asap.    Please call an advise  Thank you    "

## 2019-02-15 ENCOUNTER — TELEPHONE (OUTPATIENT)
Dept: INTERNAL MEDICINE | Facility: CLINIC | Age: 79
End: 2019-02-15

## 2019-02-15 RX ORDER — MULTIVITAMIN
1 TABLET ORAL DAILY
Qty: 30 TABLET | Refills: 3 | COMMUNITY
Start: 2019-02-15 | End: 2022-12-01 | Stop reason: SDUPTHER

## 2019-02-15 RX ORDER — ASPIRIN 81 MG/1
81 TABLET ORAL DAILY
Qty: 30 TABLET | Refills: 5 | Status: SHIPPED | OUTPATIENT
Start: 2019-02-15 | End: 2019-08-12 | Stop reason: SDUPTHER

## 2019-02-15 RX ORDER — ASCORBIC ACID 500 MG
500 TABLET ORAL DAILY
Qty: 30 TABLET | Refills: 3 | Status: SHIPPED | OUTPATIENT
Start: 2019-02-15 | End: 2019-05-14 | Stop reason: SDUPTHER

## 2019-02-15 RX ORDER — CALCIUM CARBONATE 600 MG
600 TABLET ORAL DAILY
Qty: 30 TABLET | Refills: 3 | Status: SHIPPED | OUTPATIENT
Start: 2019-02-15 | End: 2019-05-14 | Stop reason: SDUPTHER

## 2019-02-15 RX ORDER — PANTOPRAZOLE SODIUM 40 MG/1
40 TABLET, DELAYED RELEASE ORAL DAILY
Qty: 30 TABLET | Refills: 11 | Status: SHIPPED | OUTPATIENT
Start: 2019-02-15 | End: 2021-03-02 | Stop reason: SDUPTHER

## 2019-02-15 RX ORDER — MULTIVIT WITH MINERALS/HERBS
1 TABLET ORAL DAILY
Qty: 30 TABLET | Refills: 6 | Status: SHIPPED | OUTPATIENT
Start: 2019-02-15 | End: 2019-09-04 | Stop reason: SDUPTHER

## 2019-02-15 NOTE — TELEPHONE ENCOUNTER
----- Message from Fidel Morgan sent at 2/15/2019 11:51 AM CST -----  Contact: Tyrese/Madhavi Perez/[phone]  Office is calling trying to get the physician's plan of care . Pt is moving in today. They state that they also need to clarify the prescriptions. Also a fax was supposed to be faxed over to the that were never received. Please advise.        Thank You

## 2019-02-15 NOTE — TELEPHONE ENCOUNTER
Please see previous faxes.     I also printed out my progress note and med list. I am not sure what else they would need?

## 2019-02-15 NOTE — TELEPHONE ENCOUNTER
----- Message from Karla Manzo sent at 2/15/2019 12:16 PM CST -----  Contact: [facility] [phone]  [facility] [place] [address]  [phone] (Phone)  [phone] (Fax).    Patient is moving to [facility]  And needs new scripts on file with the nursing home.'   ascorbic acid (VITAMIN C) 500 MG tablet    aspirin (ECOTRIN) 81 MG EC tablet ()    b complex vitamins (VITAMINS B COMPLEX) tablet    CALCIUM CARBONATE (CALCIUM 600 ORAL)    glucosamine & chondroit sul.Na 750-400 mg Cmpk    multivitamin (THERAGRAN) per tablet    pantoprazole (PROTONIX) 40 MG tablet    Please call and advise  Thank you

## 2019-02-15 NOTE — TELEPHONE ENCOUNTER
----- Message from Viraj Eric sent at 2/15/2019 10:37 AM CST -----  Contact: Mark [phone]  Assisted Home Facility, calling requesting for the Dr to give a call back has questions about Rx patient is taking. Mark [phone]    Please call an advise  Thank you

## 2019-02-15 NOTE — TELEPHONE ENCOUNTER
----- Message from Stephani Correa sent at 2/15/2019  3:13 PM CST -----  Contact: Tyrese/Madhavi [facility]/[phone]/ [phone]/fax  Is asking for office to refax it because is not getting the faxes. Please call and advise. Thank you

## 2019-02-18 ENCOUNTER — TELEPHONE (OUTPATIENT)
Dept: INTERNAL MEDICINE | Facility: CLINIC | Age: 79
End: 2019-02-18

## 2019-02-18 NOTE — TELEPHONE ENCOUNTER
Spoke with [facility]. Stated pt moved into a assisted living facility in [place]. Pharmacy needs a d/c order for the patient's gluocosamine-chondroitn. Family states pt has never taken that and doesn't want to take. Pharmacy stated they would accept a verbal d/c order.

## 2019-02-27 ENCOUNTER — TELEPHONE (OUTPATIENT)
Dept: INTERNAL MEDICINE | Facility: CLINIC | Age: 79
End: 2019-02-27

## 2019-02-27 NOTE — TELEPHONE ENCOUNTER
----- Message from Mary Fallon sent at 2/27/2019 11:27 AM CST -----  Contact: Concepcion/ [facility]/ [phone]  [facility] is calling to speak with someone in regards to the pt. She states that she is trying to get something faxed over to her from your office in regards to the pt. She states that she would like to get a call back. Please advise.      Thanks

## 2019-03-07 ENCOUNTER — HOSPITAL ENCOUNTER (OUTPATIENT)
Dept: RADIOLOGY | Facility: HOSPITAL | Age: 79
Discharge: HOME | End: 2019-03-07
Attending: INTERNAL MEDICINE
Payer: MEDICARE

## 2019-03-07 DIAGNOSIS — N18.30 KIDNEY DISEASE, CHRONIC, STAGE III (GFR 30-59 ML/MIN): ICD-10-CM

## 2019-03-07 DIAGNOSIS — N17.9 AKI (ACUTE KIDNEY INJURY): ICD-10-CM

## 2019-03-07 DIAGNOSIS — I10 HTN (HYPERTENSION), BENIGN: ICD-10-CM

## 2019-03-07 PROCEDURE — 76770 US EXAM ABDO BACK WALL COMP: CPT | Mod: 26,,, | Performed by: RADIOLOGY

## 2019-03-07 PROCEDURE — 76770 US EXAM ABDO BACK WALL COMP: CPT | Mod: TC

## 2019-03-07 PROCEDURE — 76770 US RETROPERITONEAL COMPLETE: ICD-10-PCS | Mod: 26,,, | Performed by: RADIOLOGY

## 2019-03-12 ENCOUNTER — TELEPHONE (OUTPATIENT)
Dept: NEPHROLOGY | Facility: CLINIC | Age: 79
End: 2019-03-12

## 2019-03-12 ENCOUNTER — TELEPHONE (OUTPATIENT)
Dept: ADMINISTRATIVE | Facility: CLINIC | Age: 79
End: 2019-03-12

## 2019-03-12 NOTE — TELEPHONE ENCOUNTER
Home Health SOC 02/19/2019 to 04/19/2019 with [facility], Dr Precious Clemente. SN, PT, OT services.

## 2019-03-12 NOTE — TELEPHONE ENCOUNTER
Please let the pt know that her kidney US is stable.  She has not had the repeat blood work requested and she was also suppose to see Dr. Hayden last month.  Please have her f/u with him ASAP as her creatinine was higher than her baseline and she was suppose to get  repeat labs done which I don't see the results for.

## 2019-03-13 ENCOUNTER — TELEPHONE (OUTPATIENT)
Dept: NEPHROLOGY | Facility: CLINIC | Age: 79
End: 2019-03-13

## 2019-03-27 ENCOUNTER — PATIENT MESSAGE (OUTPATIENT)
Dept: NEPHROLOGY | Facility: CLINIC | Age: 79
End: 2019-03-27

## 2019-03-27 ENCOUNTER — TELEPHONE (OUTPATIENT)
Dept: NEPHROLOGY | Facility: CLINIC | Age: 79
End: 2019-03-27

## 2019-03-28 ENCOUNTER — TELEPHONE (OUTPATIENT)
Dept: NEPHROLOGY | Facility: CLINIC | Age: 79
End: 2019-03-28

## 2019-03-29 ENCOUNTER — PATIENT MESSAGE (OUTPATIENT)
Dept: NEPHROLOGY | Facility: CLINIC | Age: 79
End: 2019-03-29

## 2019-03-29 ENCOUNTER — TELEPHONE (OUTPATIENT)
Dept: NEPHROLOGY | Facility: CLINIC | Age: 79
End: 2019-03-29

## 2019-04-01 ENCOUNTER — TELEPHONE (OUTPATIENT)
Dept: NEPHROLOGY | Facility: CLINIC | Age: 79
End: 2019-04-01

## 2019-04-01 ENCOUNTER — PATIENT MESSAGE (OUTPATIENT)
Dept: NEPHROLOGY | Facility: CLINIC | Age: 79
End: 2019-04-01

## 2019-04-01 DIAGNOSIS — N18.30 STAGE 3 CHRONIC KIDNEY DISEASE: Primary | ICD-10-CM

## 2019-04-02 ENCOUNTER — LAB VISIT (OUTPATIENT)
Dept: LAB | Facility: HOSPITAL | Age: 79
End: 2019-04-02
Attending: INTERNAL MEDICINE
Payer: MEDICARE

## 2019-04-02 DIAGNOSIS — N18.30 STAGE 3 CHRONIC KIDNEY DISEASE: ICD-10-CM

## 2019-04-02 LAB
ALBUMIN SERPL BCP-MCNC: 3.9 G/DL (ref 3.5–5.2)
ANION GAP SERPL CALC-SCNC: 8 MMOL/L (ref 8–16)
BASOPHILS # BLD AUTO: 0.02 K/UL (ref 0–0.2)
BASOPHILS NFR BLD: 0.3 % (ref 0–1.9)
BUN SERPL-MCNC: 39 MG/DL (ref 8–23)
CALCIUM SERPL-MCNC: 9.4 MG/DL (ref 8.7–10.5)
CHLORIDE SERPL-SCNC: 109 MMOL/L (ref 95–110)
CO2 SERPL-SCNC: 25 MMOL/L (ref 23–29)
CREAT SERPL-MCNC: 2 MG/DL (ref 0.5–1.4)
DIFFERENTIAL METHOD: ABNORMAL
EOSINOPHIL # BLD AUTO: 0.1 K/UL (ref 0–0.5)
EOSINOPHIL NFR BLD: 2.3 % (ref 0–8)
ERYTHROCYTE [DISTWIDTH] IN BLOOD BY AUTOMATED COUNT: 13.8 % (ref 11.5–14.5)
EST. GFR  (AFRICAN AMERICAN): 26 ML/MIN/1.73 M^2
EST. GFR  (NON AFRICAN AMERICAN): 23 ML/MIN/1.73 M^2
GLUCOSE SERPL-MCNC: 81 MG/DL (ref 70–110)
HCT VFR BLD AUTO: 31.9 % (ref 37–48.5)
HGB BLD-MCNC: 10.1 G/DL (ref 12–16)
LYMPHOCYTES # BLD AUTO: 1.1 K/UL (ref 1–4.8)
LYMPHOCYTES NFR BLD: 18.9 % (ref 18–48)
MCH RBC QN AUTO: 30.4 PG (ref 27–31)
MCHC RBC AUTO-ENTMCNC: 31.7 G/DL (ref 32–36)
MCV RBC AUTO: 96 FL (ref 82–98)
MONOCYTES # BLD AUTO: 0.6 K/UL (ref 0.3–1)
MONOCYTES NFR BLD: 10.2 % (ref 4–15)
NEUTROPHILS # BLD AUTO: 4.1 K/UL (ref 1.8–7.7)
NEUTROPHILS NFR BLD: 68 % (ref 38–73)
PHOSPHATE SERPL-MCNC: 4.5 MG/DL (ref 2.7–4.5)
PLATELET # BLD AUTO: 199 K/UL (ref 150–350)
PMV BLD AUTO: 11 FL (ref 9.2–12.9)
POTASSIUM SERPL-SCNC: 4.5 MMOL/L (ref 3.5–5.1)
RBC # BLD AUTO: 3.32 M/UL (ref 4–5.4)
SODIUM SERPL-SCNC: 142 MMOL/L (ref 136–145)
WBC # BLD AUTO: 5.98 K/UL (ref 3.9–12.7)

## 2019-04-02 PROCEDURE — 80069 RENAL FUNCTION PANEL: CPT

## 2019-04-02 PROCEDURE — 85025 COMPLETE CBC W/AUTO DIFF WBC: CPT

## 2019-04-02 PROCEDURE — 36415 COLL VENOUS BLD VENIPUNCTURE: CPT

## 2019-04-03 ENCOUNTER — OFFICE VISIT (OUTPATIENT)
Dept: NEPHROLOGY | Facility: CLINIC | Age: 79
End: 2019-04-03
Payer: MEDICARE

## 2019-04-03 VITALS
OXYGEN SATURATION: 98 % | WEIGHT: 142.44 LBS | DIASTOLIC BLOOD PRESSURE: 62 MMHG | BODY MASS INDEX: 20.39 KG/M2 | HEIGHT: 70 IN | SYSTOLIC BLOOD PRESSURE: 134 MMHG | HEART RATE: 52 BPM

## 2019-04-03 DIAGNOSIS — N18.30 KIDNEY DISEASE, CHRONIC, STAGE III (GFR 30-59 ML/MIN): ICD-10-CM

## 2019-04-03 DIAGNOSIS — N18.1 CKD (CHRONIC KIDNEY DISEASE) STAGE 1, GFR 90 ML/MIN OR GREATER: Primary | ICD-10-CM

## 2019-04-03 PROCEDURE — 99999 PR PBB SHADOW E&M-EST. PATIENT-LVL IV: CPT | Mod: PBBFAC,GC,, | Performed by: INTERNAL MEDICINE

## 2019-04-03 PROCEDURE — 99213 OFFICE O/P EST LOW 20 MIN: CPT | Mod: S$PBB,GC,, | Performed by: INTERNAL MEDICINE

## 2019-04-03 PROCEDURE — 99213 PR OFFICE/OUTPT VISIT, EST, LEVL III, 20-29 MIN: ICD-10-PCS | Mod: S$PBB,GC,, | Performed by: INTERNAL MEDICINE

## 2019-04-03 PROCEDURE — 99214 OFFICE O/P EST MOD 30 MIN: CPT | Mod: PBBFAC | Performed by: INTERNAL MEDICINE

## 2019-04-03 PROCEDURE — 99999 PR PBB SHADOW E&M-EST. PATIENT-LVL IV: ICD-10-PCS | Mod: PBBFAC,GC,, | Performed by: INTERNAL MEDICINE

## 2019-04-03 RX ORDER — LISINOPRIL 20 MG/1
TABLET ORAL
COMMUNITY
End: 2019-04-03 | Stop reason: SDUPTHER

## 2019-04-03 RX ORDER — MEMANTINE HYDROCHLORIDE 10 MG/1
TABLET ORAL
COMMUNITY
End: 2021-03-01

## 2019-04-03 NOTE — PROGRESS NOTES
Subjective:       Patient ID: Daniela Harris is a 79 y.o. 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 Patient has longstanding history of HTN, it is felt her CKD is from HTN.  Renal artery dopplers are negative, renin/aldosterone ratio and levels are negative. No recent hospitalization. Patient denies any recent illness, nausea, vomiting, diarrhea, decreased urine output or any blood on urine. Denies use of NSAIDs. Creatinine 1.6 on most recent labs (1.4 a year prior), may have been slightly dehydrated on this most recent lab check.    Review of Systems   Constitutional: Negative for chills, fatigue and fever.   Respiratory: Negative for chest tightness and shortness of breath.    Cardiovascular: Negative for chest pain and leg swelling.   Gastrointestinal: Negative for abdominal distention, abdominal pain, diarrhea and nausea.   Genitourinary: Negative for decreased urine volume, difficulty urinating, flank pain, frequency, hematuria and urgency.   Skin: Negative for rash.   Neurological: Negative for tremors, speech difficulty and weakness.       Objective:      Physical Exam   Constitutional: She is oriented to person, place, and time.   HENT:   Head: Normocephalic and atraumatic.   Eyes: EOM are normal.   Neck: No JVD present.   Cardiovascular: Normal rate and regular rhythm.   Pulmonary/Chest: Effort normal and breath sounds normal.   Abdominal: Soft. She exhibits no distension.   Musculoskeletal: She exhibits no edema or tenderness.   Neurological: She is alert and oriented to person, place, and time.   Skin: Skin is warm.   Psychiatric: She has a normal mood and  affect. Her behavior is normal.       Assessment & Plan:       Kidney disease, chronic, stage III (GFR 30-59 ml/min)  Kidney disease, chronic, stage III (GFR 30-59 ml/min)  1. CKD: stage III, +/- some progression since last year, but overall relatively stable, underlying etiology likely HTN           Lab Results   Component Value Date     CREATININE 1.6 (H) 09/01/2018      Protein Creatinine Ratios: negative, good prognosis           · Prot/Creat Ratio, Ur   Date Value Ref Range Status   09/27/2017 Unable to calculate 0.00 - 0.20 Final   08/16/2017 0.06 0.00 - 0.20 Final   05/11/2017 0.04 0.00 - 0.20 Final   ·    ·    ·    Acid-Base: not an issue        Lab Results   Component Value Date      09/01/2018     K 4.5 09/01/2018     CO2 27 09/01/2018      2. HTN: Blood pressures well controlled in the office today     3. Renal osteodystrophy: last PTH negative        Lab Results   Component Value Date     PTH 22.0 09/01/2018     CALCIUM 9.8 09/01/2018     PHOS 4.6 (H) 09/01/2018         4. Anemia: borderline, no iron deficiency, will monitor, PCP for CA screening (if felt to be needed)        Lab Results   Component Value Date     HGB 11.7 (L) 09/01/2018         5. DM:  Last HbA1C, not a diabetic        Lab Results   Component Value Date     HGBA1C 5.5 04/11/2018         6. Lipid management: reviewed, PCP managing        Lab Results   Component Value Date     LDLCALC 79.4 04/11/2018         7. ESRD planing: do not anticipate a need at this time     Follow up in one year with labs twice yearly     Patient seen with Dr Douglas

## 2019-04-03 NOTE — PATIENT INSTRUCTIONS
1) stay off the lisinopril  2) try to stay hydrated as much as possible  3) no NSAIDs  4) follow up with us in 6-8 months

## 2019-04-04 DIAGNOSIS — E78.5 HYPERLIPIDEMIA, UNSPECIFIED HYPERLIPIDEMIA TYPE: ICD-10-CM

## 2019-04-04 DIAGNOSIS — I63.9 ACUTE ISCHEMIC STROKE: ICD-10-CM

## 2019-04-05 ENCOUNTER — TELEPHONE (OUTPATIENT)
Dept: NEUROLOGY | Facility: CLINIC | Age: 79
End: 2019-04-05

## 2019-04-05 RX ORDER — ATORVASTATIN CALCIUM 80 MG/1
80 TABLET, FILM COATED ORAL DAILY
Qty: 90 TABLET | Refills: 1 | Status: SHIPPED | OUTPATIENT
Start: 2019-04-05 | End: 2020-12-08

## 2019-04-05 NOTE — TELEPHONE ENCOUNTER
Pt daughter in law states she will keep thr appt      ----- Message from Tae Joel sent at 4/5/2019  2:17 PM CDT -----  Needs Advice    Reason for call: Fabrizio is calling to reschedule appt on 06/12 to a Tuesday or Thursday.        Communication Preference: Araceli (daughter in law) @ [facility] or     Additional Information:

## 2019-04-11 NOTE — PROGRESS NOTES
[facility]    The note from the fellow/resident was reviewed. I have personally interviewed and examined the patient. There were no additional findings with regards to the history or physical exam.    I agree with the assessment and plan of  Dr. Rudy Hayden

## 2019-05-15 RX ORDER — ASCORBIC ACID 500 MG
500 TABLET ORAL DAILY
Qty: 30 TABLET | Refills: 3 | Status: SHIPPED | OUTPATIENT
Start: 2019-05-15 | End: 2022-12-15 | Stop reason: SDUPTHER

## 2019-05-15 RX ORDER — CALCIUM CARBONATE 600 MG
600 TABLET ORAL DAILY
Qty: 30 TABLET | Refills: 3 | Status: SHIPPED | OUTPATIENT
Start: 2019-05-15 | End: 2019-10-16 | Stop reason: SDUPTHER

## 2019-05-15 NOTE — TELEPHONE ENCOUNTER
Approved Medications      ascorbic acid, vitamin C, (VITAMIN C) 500 MG tablet         Sig: Take 1 tablet (500 mg total) by mouth once daily. 1 Tablet Oral Every day    Disp:  30 tablet    Refills:  3    Start: 5/15/2019    Class: Normal    Authorized by: Precious Clemente MD        To be filled at: [facility] - [place]  [address] [place] [address]

## 2019-05-15 NOTE — TELEPHONE ENCOUNTER
Approved Medications      calcium carbonate (CALCIUM 600) 600 mg calcium (1,500 mg) Tab         Sig: Take 1 tablet (600 mg total) by mouth once daily. 1  By mouth Every day    Disp:  30 tablet    Refills:  3    Start: 5/15/2019 - 5/14/2020    Class: Normal    Authorized by: Precious Clemente MD        To be filled at: [facility] - [place] - [address] [place] [address]

## 2019-05-19 ENCOUNTER — PATIENT MESSAGE (OUTPATIENT)
Dept: NEUROLOGY | Facility: CLINIC | Age: 79
End: 2019-05-19

## 2019-05-23 RX ORDER — CALCIUM CARBONATE 600 MG
TABLET ORAL
Qty: 30 TABLET | Refills: 3 | OUTPATIENT
Start: 2019-05-23

## 2019-05-23 RX ORDER — TRIAMTERENE AND HYDROCHLOROTHIAZIDE 37.5; 25 MG/1; MG/1
CAPSULE ORAL
Qty: 30 CAPSULE | Refills: 1 | OUTPATIENT
Start: 2019-05-23

## 2019-05-23 RX ORDER — BLACK COHOSH ROOT 200 MG
CAPSULE ORAL
Qty: 30 TABLET | Refills: 3 | OUTPATIENT
Start: 2019-05-23

## 2019-05-29 ENCOUNTER — PATIENT MESSAGE (OUTPATIENT)
Dept: INTERNAL MEDICINE | Facility: CLINIC | Age: 79
End: 2019-05-29

## 2019-05-29 DIAGNOSIS — Z12.39 SCREENING BREAST EXAMINATION: Primary | ICD-10-CM

## 2019-05-30 NOTE — TELEPHONE ENCOUNTER
Left message for Mrs. Harris on her Neighborlandhart. Call pt no answer no voice mail. Mammogram order was placed pt just has to call and schedule when ready.

## 2019-06-04 ENCOUNTER — PATIENT MESSAGE (OUTPATIENT)
Dept: INTERNAL MEDICINE | Facility: CLINIC | Age: 79
End: 2019-06-04

## 2019-06-05 RX ORDER — FERROUS GLUCONATE 324(38)MG
TABLET ORAL
Qty: 60 TABLET | Refills: 3 | Status: SHIPPED | OUTPATIENT
Start: 2019-06-05 | End: 2019-11-11 | Stop reason: SDUPTHER

## 2019-06-07 ENCOUNTER — TELEPHONE (OUTPATIENT)
Dept: INTERNAL MEDICINE | Facility: CLINIC | Age: 79
End: 2019-06-07

## 2019-06-07 RX ORDER — LISINOPRIL 20 MG/1
TABLET ORAL
Qty: 90 TABLET | Refills: 0 | Status: SHIPPED | OUTPATIENT
Start: 2019-06-07 | End: 2019-06-07

## 2019-06-07 NOTE — TELEPHONE ENCOUNTER
Spoke with [facility] to inform them of Dr. Chyna villarreal of the medication Lisinopril 20 mg tablet. Because it did not go through electronically.

## 2019-06-08 NOTE — TELEPHONE ENCOUNTER
----- Message from Ness Wilson sent at 6/8/2019 12:24 PM CDT -----  Pt requests refill on the following medication: Lisinopril 20 MG tablet. 90 day supply    Pharmacy:[facility] [place] - [id number]      Thank you

## 2019-06-10 RX ORDER — LISINOPRIL 20 MG/1
20 TABLET ORAL DAILY
Qty: 90 TABLET | Refills: 0 | OUTPATIENT
Start: 2019-06-10

## 2019-06-12 ENCOUNTER — OFFICE VISIT (OUTPATIENT)
Dept: NEUROLOGY | Facility: CLINIC | Age: 79
End: 2019-06-12
Payer: MEDICARE

## 2019-06-12 DIAGNOSIS — F03.90 MAJOR NEUROCOGNITIVE DISORDER DUE TO ALZHEIMER'S DISEASE, PROBABLE, WITHOUT BEHAVIORAL DISTURBANCE: Primary | ICD-10-CM

## 2019-06-12 PROCEDURE — 99999 PR PBB SHADOW E&M-EST. PATIENT-LVL I: ICD-10-PCS | Mod: PBBFAC,,,

## 2019-06-12 PROCEDURE — 99214 PR OFFICE/OUTPT VISIT, EST, LEVL IV, 30-39 MIN: ICD-10-PCS | Mod: S$PBB,,, | Performed by: PSYCHIATRY & NEUROLOGY

## 2019-06-12 PROCEDURE — 90791 PR PSYCHIATRIC DIAGNOSTIC EVALUATION: ICD-10-PCS | Mod: ,,, | Performed by: CLINICAL NEUROPSYCHOLOGIST

## 2019-06-12 PROCEDURE — 99999 PR PBB SHADOW E&M-EST. PATIENT-LVL I: CPT | Mod: PBBFAC,,,

## 2019-06-12 PROCEDURE — 90791 PSYCH DIAGNOSTIC EVALUATION: CPT | Mod: ,,, | Performed by: CLINICAL NEUROPSYCHOLOGIST

## 2019-06-12 PROCEDURE — 99214 OFFICE O/P EST MOD 30 MIN: CPT | Mod: S$PBB,,, | Performed by: PSYCHIATRY & NEUROLOGY

## 2019-06-12 PROCEDURE — 99211 OFF/OP EST MAY X REQ PHY/QHP: CPT | Mod: PBBFAC

## 2019-06-12 NOTE — PROGRESS NOTES
Name: Daniela Harris  MRN: [id number]   CSN: [id number]      Date: 6-12-19      Referring physician:  Elsy Lovelace, BRANDY  [address] [place] [address]    Subjective:      Chief Complaint: memroy    History of Present Illness (HPI):    Daniela Harris is a 79 y.o.  female who presents today for an initial evaluation in [facility] for dementia, probable AD, late onset, without behavioral disturbance. She is accompanied by her . She is known to me from neuro clinic with last visit 12-6-18. At that time, memantine cont at 10 mg BID. She has been intolerant to donepezil.       In Feb, they moved to [facility] in [place]. They have an apartment there and are both very happy. She says she loves it and does not have to do a thing.     She is not able to tell us what she did today or what she has had to eat.     He says she could not do much without him. She looks for him to make all the decisions. He has to tell her the same thing over and over, which does frustrate him at times. She is not able to pick out her own clothes. She will ask him what to wear but is able to then dress herself.  He offers that [facility] does have a Memory Care Unit if needed.     She has a bath aide. She does not mind this at all and in fact tells her  she enjoys it.   She is independent with feeding.    No issues with toileting.   Not argumentative.   Appetite fair.   Sleep okay.   No paranoia.           Review of Systems   Unable to perform ROS: Dementia       Past Medical History: The patient  has a past medical history of After-cataract, obscuring vision - Left Eye (2/28/2014), Arthritis, Diverticulitis, Goiter, History of appendectomy, Hyperlipidemia, Hypothyroidism, Osteopenia, Other and unspecified hyperlipidemia, Peptic ulcer, Psoriasis, Stroke, Unspecified essential hypertension, Unspecified vitamin D deficiency, and Vitamin B 12 deficiency.    Social History: The patient  reports that  she has never smoked. She has never used smokeless tobacco. She reports that she does not drink alcohol or use drugs.    Family History: Their family history includes Alzheimer's disease in her father; Cataracts in her father; Dementia in her father and maternal uncle; Diabetes in her mother and sister; Hyperlipidemia in her sister; Hypertension in her father, sister, and sister; No Known Problems in her brother, maternal aunt, maternal grandfather, paternal aunt, paternal grandfather, paternal grandmother, and paternal uncle; Parkinsonism in her sister; Thyroid disease in her cousin, maternal grandmother, and sister.    Allergies: Cephalexin     Meds:   Current Outpatient Medications on File Prior to Visit   Medication Sig Dispense Refill    ascorbic acid, vitamin C, (VITAMIN C) 500 MG tablet Take 1 tablet (500 mg total) by mouth once daily. 1 Tablet Oral Every day 30 tablet 3    aspirin (ECOTRIN) 81 MG EC tablet Take 1 tablet (81 mg total) by mouth once daily. 30 tablet 5    atorvastatin (LIPITOR) 80 MG tablet Take 1 tablet (80 mg total) by mouth once daily. 90 tablet 1    b complex vitamins (VITAMINS B COMPLEX) tablet Take 1 tablet by mouth once daily. 1 Tablet Oral Every day 30 tablet 6    calcium carbonate (CALCIUM 600) 600 mg calcium (1,500 mg) Tab Take 1 tablet (600 mg total) by mouth once daily. 1  By mouth Every day 30 tablet 3    cloNIDine (CATAPRES) 0.1 MG tablet Take 1 tablet (0.1 mg total) by mouth 2 (two) times daily. 60 tablet 3    ferrous gluconate (FERGON) 324 MG tablet TAKE ONE TABLET BY MOUTH TWICE DAILY with meals. 60 tablet 3    glucosamine-chondroitn sulf.Na 750-400 mg Cmpk Take 1 tablet by mouth once daily. 1  By mouth Every day 30 each 3    levothyroxine (SYNTHROID) 75 MCG tablet Take 1 tablet (75 mcg total) by mouth after dinner. 90 tablet 1    memantine (NAMENDA) 10 MG Tab memantine 10 mg tablet   Take 1 tablet twice a day by oral route for 30 days.      multivitamin (THERAGRAN)  per tablet Take 1 tablet by mouth once daily. 1 Tablet Oral Every day 30 tablet 3    pantoprazole (PROTONIX) 40 MG tablet Take 1 tablet (40 mg total) by mouth once daily. prn 30 tablet 11    triamterene-hydrochlorothiazide 37.5-25 mg (DYAZIDE) 37.5-25 mg per capsule Take 1 capsule by mouth every morning. 30 capsule 1     Current Facility-Administered Medications on File Prior to Visit   Medication Dose Route Frequency Provider Last Rate Last Dose    cloNIDine tablet 0.1 mg  0.1 mg Oral 1 time in Clinic/HOD Pranay Brown MD           Objective:     Physical Exam:      Constitutional  Well-developed, well-nourished, appears stated age     Awake, alert, pleasant. Crocker to person but not situation. She thinks they are here for his appointment today.   Relies on  for history.   Smiles frequently. Will engage some when talking about their new apartment.   Gait a little guarded and a bit slowed but steady. No assistive device needed.      Laboratory Results:  Lab Visit on 04/02/2019   Component Date Value Ref Range Status    Glucose 04/02/2019 81  70 - 110 mg/dL Final    Sodium 04/02/2019 142  136 - 145 mmol/L Final    Potassium 04/02/2019 4.5  3.5 - 5.1 mmol/L Final    Chloride 04/02/2019 109  95 - 110 mmol/L Final    CO2 04/02/2019 25  23 - 29 mmol/L Final    BUN, Bld 04/02/2019 39* 8 - 23 mg/dL Final    Calcium 04/02/2019 9.4  8.7 - 10.5 mg/dL Final    Creatinine 04/02/2019 2.0* 0.5 - 1.4 mg/dL Final    Albumin 04/02/2019 3.9  3.5 - 5.2 g/dL Final    Phosphorus 04/02/2019 4.5  2.7 - 4.5 mg/dL Final    eGFR if  04/02/2019 26* >60 mL/min/1.73 m^2 Final    eGFR if non African American 04/02/2019 23* >60 mL/min/1.73 m^2 Final    Anion Gap 04/02/2019 8  8 - 16 mmol/L Final    WBC 04/02/2019 5.98  3.90 - 12.70 K/uL Final    RBC 04/02/2019 3.32* 4.00 - 5.40 M/uL Final    Hemoglobin 04/02/2019 10.1* 12.0 - 16.0 g/dL Final    Hematocrit 04/02/2019 31.9* 37.0 - 48.5 % Final    Mean  Corpuscular Volume 04/02/2019 96  82 - 98 fL Final    Mean Corpuscular Hemoglobin 04/02/2019 30.4  27.0 - 31.0 pg Final    Mean Corpuscular Hemoglobin Conc 04/02/2019 31.7* 32.0 - 36.0 g/dL Final    RDW 04/02/2019 13.8  11.5 - 14.5 % Final    Platelets 04/02/2019 199  150 - 350 K/uL Final    MPV 04/02/2019 11.0  9.2 - 12.9 fL Final    Gran # (ANC) 04/02/2019 4.1  1.8 - 7.7 K/uL Final    Lymph # 04/02/2019 1.1  1.0 - 4.8 K/uL Final    Mono # 04/02/2019 0.6  0.3 - 1.0 K/uL Final    Eos # 04/02/2019 0.1  0.0 - 0.5 K/uL Final    Baso # 04/02/2019 0.02  0.00 - 0.20 K/uL Final    Gran% 04/02/2019 68.0  38.0 - 73.0 % Final    Lymph% 04/02/2019 18.9  18.0 - 48.0 % Final    Mono% 04/02/2019 10.2  4.0 - 15.0 % Final    Eosinophil% 04/02/2019 2.3  0.0 - 8.0 % Final    Basophil% 04/02/2019 0.3  0.0 - 1.9 % Final    Differential Method 04/02/2019 Automated   Final   Lab Visit on 04/02/2019   Component Date Value Ref Range Status    Protein, Urine Random 04/02/2019 <7  0 - 15 mg/dL Final    Creatinine, Random Ur 04/02/2019 79.1  15.0 - 325.0 mg/dL Final    Prot/Creat Ratio, Ur 04/02/2019 Unable to calculate  0.00 - 0.20 Final    Specimen UA 04/02/2019 Urine, Clean Catch   Final    Color, UA 04/02/2019 Yellow  Yellow, Straw, Ofe Final    Appearance, UA 04/02/2019 Hazy* Clear Final    pH, UA 04/02/2019 6.0  5.0 - 8.0 Final    Specific Gravity, UA 04/02/2019 1.010  1.005 - 1.030 Final    Protein, UA 04/02/2019 Negative  Negative Final    Glucose, UA 04/02/2019 Negative  Negative Final    Ketones, UA 04/02/2019 Negative  Negative Final    Bilirubin (UA) 04/02/2019 Negative  Negative Final    Occult Blood UA 04/02/2019 Negative  Negative Final    Nitrite, UA 04/02/2019 Negative  Negative Final    Urobilinogen, UA 04/02/2019 Negative  <2.0 EU/dL Final    Leukocytes, UA 04/02/2019 2+* Negative Final    WBC, UA 04/02/2019 8* 0 - 5 /hpf Final    Microscopic Comment 04/02/2019 SEE COMMENT   Final        Imaging:   Independent review of images:             Impression        Cortical band of diffusion and flair signal hyperintensity right occipital lobe with question underlying component of gradient susceptibility.  While overall nonspecific concern for possible encephalitis versus sequela of seizure activity.  Small   hemorrhagic contusion as a sequela of recent trauma to be considered with recent ischemia/infarction felt to be much less likely.    Superimposed age-appropriate generalized volume loss with small remote left inferior frontal infarction and scattered small foci of T2/FLAIR signal abnormality within the supratentorial white matter  while nonspecific suggestive for moderate degree of    chronic microvascular ischemic change.    No evidence for acute infarction or enhancing lesion.      Electronically signed by: YOJANA SAUCEDO DO  Date: 12/03/14       Assessment and Plan     Major neurocognitive disorder due to Alzheimer's disease, probable, without behavioral disturbance        Medical Decision Making:    Continue memantine without change. She does have CKD, so should use with caution. Certainly, this can be lowered or weaned if deemed necessary by nephrololgy. I have discussed this with Mr. Harris and he verbalizes understanding.   Follow up as needed.     I spent 35 minutes face-to-face with the patient and  with >95% of the time spent with counseling and education regarding:  - results of data, diagnosis, and recommendations stated above  - the prognosis of dementia  - risks and benefits of memantine    Elsy Lovelace, RAINA, NP-C  Division of Movement and Memory Disorders  [facility]  [phone]

## 2019-06-12 NOTE — PROGRESS NOTES
INTERDISCIPLINARY MEMORY CLINIC  Outpatient Neuropsychology Follow-up    Referral Information  Name: Daniela Harris  MRN: [id number]  COATS: 2019  : 1940  Age: 79 y.o.  Handedness: Right  Race: White  Gender: female  Referring Provider: Elsy Lovelace Np  [address] [place] [address]  Referral Reason/Medical Necessity: Cognitive and functional decline in the past 6-months; family wonders about her dementia stage and level of care needs.  Billing: See below for details as coding/billing has changed   Consent: The patient expressed an understanding of the purpose of the evaluation and consented to all procedures.    SUMMARY/TREATMENT PLAN   Results from interdisciplinary neuropsychology and neurology Memory Cllinic Evaluation (cognitive testing, clinical interviews, physical exam, review of records) indicate the following diagnoses and treatment plan recommendations. This was discussed with  and via phone with son and daughter-in-law.     Diagnoses  -Major Neurocognitive Disorder: Ms. Harris has Alzheimer's dementia that is now in the advanced/severe stage (MMSE=5/30). See recommendations below:    Recommendations/Treatment Plan: Diagnoses and recommendations were discussed The patient cannot follow a treatment plan without help from family.    · Memory Clinic Follow-up: As patient is in the advanced stage with no behavioral/caregiver issues, no need for scheduled follow-up. Follow-up PRN.   Updated Labs: See Dr. Lovelace's Neurology note    · Medication Review: Please review Dr. Lovelace's Neurology note for any medication changes.    · Primary Care Follow-up:  Recommend reviewing kidney labs and whether namenda remains a good choice.     · Physical Activity: Recommend daily supported physical activity and discussed with the daughter.     · Supervision/Monitoring: Reviewed supervision recommendations with family. She is now in an half-way and that is optimal. She is not in a memory care unit at this  time. No need for placement there until treatment feels that level of care is necessary.    Recommendations for Ms. Harris and Caregivers/Family:   · Brain Health: Discussed vascular health and brain health with .     MEHDI Arellano II, Ph.D., ABPP-CN  Board Certified Clinical Neuropsychologist  Co-Director, Cognitive Disorders and Brain Health Program  Department of Neurology and Neurosciences  [facility]    HISTORY OF PRESENT ILLNESS AND CURRENT SYMPTOMS  Ms. Harris has had Alzehimer's dementia for several years. She and her  were recently living in their own home.     Current Cognitive Symptoms:   Symptoms:Patient has severe memory loss, absent insight, and increased language trouble.    Current Course: Recently moved to [facility] in February 2019 with her .    Response to recommendations/strategies: She can follow activities better when her  prompts.     Current Psychiatric/Behavioral Symptoms:   Mood: No issues   Behavior: No issues   Neurovegetative: Sleep is okay. Appetite is fine.    Hallucinations/Delusions: None     Current Functional Status/Needs:   ADLs: Someone bathes her at the NH. She can feed herself.  picks out her clothes and she can put on her clothes. No trouble with bathroom   IADLs: Dependent fully    Current Physical Symptoms: See neurology note from Memory Clinic Assessment     PERTINENT BACKGROUND INFORMATION  Social History:  · Family Status:  to  for >50-years + 2 sons who are involved  · [facility] and moved in February 2019  · Primary Source of Support: Family  · Caregiver Issues: Mild frustration with her memory loss and dependency now, but he reports that he tries to be less irritable  · Daily Activities:   · Stressors:  · Other Pertinent Factors:  · Educational Level:  HS    MEDICAL STATUS  Patient Active Problem List   Diagnosis    Osteopenia    Hyperlipidemia    HTN  (hypertension), benign    Epiretinal membrane - Both Eyes    Pseudophakia - Both Eyes    Refractive error - Both Eyes    Vitamin D deficiency disease    Hypothyroidism    Iron deficiency anemia    Cerebral atherosclerosis    B12 deficiency anemia    Peptic ulcer disease with hemorrhage    Encephalopathies    Gastric ulcer    Kidney disease, chronic, stage III (GFR 30-59 ml/min)    Late onset Alzheimer's disease without behavioral disturbance     Past Medical History:   Diagnosis Date    After-cataract, obscuring vision - Left Eye 2/28/2014    Arthritis     Diverticulitis     Goiter     MNG    History of appendectomy     Hyperlipidemia     following diet    Hypothyroidism     s/p surgery    Osteopenia     Other and unspecified hyperlipidemia     Peptic ulcer     x 2    Psoriasis     no active illness for years    Stroke     Unspecified essential hypertension     Unspecified vitamin D deficiency     Vitamin B 12 deficiency      Past Surgical History:   Procedure Laterality Date    APPENDECTOMY      CATARACT EXTRACTION      ou    CATARACT EXTRACTION W/ INTRAOCULAR LENS  IMPLANT, BILATERAL      COLONOSCOPY      COLONOSCOPY N/A 12/4/2014    Performed by [facility]je [facility]court MD at [facility] (2ND FLR)    ESOPHAGOGASTRODUODENOSCOPY      ESOPHAGOGASTRODUODENOSCOPY (EGD) N/A 3/24/2015    Performed by Dereje [facility]court MD at [facility] (4TH FLR)    ESOPHAGOGASTRODUODENOSCOPY (EGD) N/A 12/4/2014    Performed by [facility]je [facility]court MD at [facility] (2ND FLR)    THYROID SURGERY      Total    TONSILLECTOMY      TONSILLECTOMY, ADENOIDECTOMY      TUBAL LIGATION         Updated/Relevant Neurologic History:  · Falls: None  · TBI: None  · Seizures: None  · Stroke: None  · Movement Concerns: None    Updated/Relevant Psychiatric History: None    Recent Labs  Lab Results   Component Value Date    OWYJWUSA98 >2000 (H) 12/12/2018     No results found for: RPR  Lab Results   Component Value Date    FOLATE 19.6  12/12/2018     Lab Results   Component Value Date    TSH 0.896 04/11/2018    N5BUWSM 6.7 12/02/2014     Lab Results   Component Value Date    HGBA1C 5.5 04/11/2018     No results found for: HIV1X2, GSL02FEHC    Recent Imaging  None  Current Medications    Current Outpatient Medications:     ascorbic acid, vitamin C, (VITAMIN C) 500 MG tablet, Take 1 tablet (500 mg total) by mouth once daily. 1 Tablet Oral Every day, Disp: 30 tablet, Rfl: 3    aspirin (ECOTRIN) 81 MG EC tablet, Take 1 tablet (81 mg total) by mouth once daily., Disp: 30 tablet, Rfl: 5    atorvastatin (LIPITOR) 80 MG tablet, Take 1 tablet (80 mg total) by mouth once daily., Disp: 90 tablet, Rfl: 1    b complex vitamins (VITAMINS B COMPLEX) tablet, Take 1 tablet by mouth once daily. 1 Tablet Oral Every day, Disp: 30 tablet, Rfl: 6    calcium carbonate (CALCIUM 600) 600 mg calcium (1,500 mg) Tab, Take 1 tablet (600 mg total) by mouth once daily. 1  By mouth Every day, Disp: 30 tablet, Rfl: 3    cloNIDine (CATAPRES) 0.1 MG tablet, Take 1 tablet (0.1 mg total) by mouth 2 (two) times daily., Disp: 60 tablet, Rfl: 3    ferrous gluconate (FERGON) 324 MG tablet, TAKE ONE TABLET BY MOUTH TWICE DAILY with meals., Disp: 60 tablet, Rfl: 3    glucosamine-chondroitn sulf.Na 750-400 mg Cmpk, Take 1 tablet by mouth once daily. 1  By mouth Every day, Disp: 30 each, Rfl: 3    levothyroxine (SYNTHROID) 75 MCG tablet, Take 1 tablet (75 mcg total) by mouth after dinner., Disp: 90 tablet, Rfl: 1    memantine (NAMENDA) 10 MG Tab, memantine 10 mg tablet  Take 1 tablet twice a day by oral route for 30 days., Disp: , Rfl:     multivitamin (THERAGRAN) per tablet, Take 1 tablet by mouth once daily. 1 Tablet Oral Every day, Disp: 30 tablet, Rfl: 3    pantoprazole (PROTONIX) 40 MG tablet, Take 1 tablet (40 mg total) by mouth once daily. prn, Disp: 30 tablet, Rfl: 11    triamterene-hydrochlorothiazide 37.5-25 mg (DYAZIDE) 37.5-25 mg per capsule, Take 1 capsule by mouth  "every morning., Disp: 30 capsule, Rfl: 1    Current Facility-Administered Medications:     cloNIDine tablet 0.1 mg, 0.1 mg, Oral, 1 time in Clinic/HOD, Pranay Brown MD    MENTAL STATUS AND OBSERVATIONS:  APPEARANCE: Casually dressed and adequate grooming/hygiene.   ALERTNESS/ORIENTATION: Attentive and alert. Disoriented fully to time and place  GAIT: Review Neurology Note from Clinic  MOTOR MOVEMENTS/MANNERISMS: Review Neurology Note from Clinic  SPEECH/LANGUAGE: Normal in rate, rhythm, tone, and volume. Mild to moderate word finding difficulty noted. Expressive language was normal. Receptive language was normal for one step commands. Commands beyond one step required repetition and simplification  STATED MOOD/AFFECT: The patients stated mood was "good." Affect was congruent with stated mood.   INTERPERSONAL BEHAVIOR: Rapport was quickly and easily established   SUICIDALITY/HOMICIDALITY: Denied  HALLUCINATIONS/DELUSIONS: None evidenced or endorsed  THOUGHT PROCESSES/INSIGHT: Minimal insight. Defensive when prompted with cognitive questions.   TEST TAKING BEHAVIOR and VALIDITY: Observation of effort was suggestive of adequate engagement. The current results, therefore, are likely a valid reflection of the patient's current functioning.     PROCEDURES/TESTS ADMINISTERED:  In addition to performing a review of pertinent medical records, reviewing limits to confidentiality, conducting a clinical interview, and explaining procedures, the following measures were administered: Mini-Mental Status Exam (MMSE) Manual norms were used unless otherwise indicated.      TEST RESULTS  MMSE=5/30 (Fully disoriented to time and place).     BILLING  Service Description CPT Code Minutes Units   Psychiatric diagnostic evaluation by physician [id number]   Neurobehavioral status exam by physician [id number]  0   Each additional hour by physician [id number]  0   Test Evaluation Services --  --   Neuropsychological testing evaluation services by " physician [id number]  0   Each additional hour by physician 56261  0   Test Administration and Scoring --  --   Psychological or neuropsychological test administration and scoring by physician [id number]  0   Each additional 30 minutes by physician [id number]  0   Psychological or neuropsychological test administration and scoring by technician [id number]  0   Each additional 30 minutes by technician [id number]  0

## 2019-06-14 RX ORDER — TRIAMTERENE AND HYDROCHLOROTHIAZIDE 37.5; 25 MG/1; MG/1
CAPSULE ORAL
Qty: 30 CAPSULE | Refills: 1 | Status: SHIPPED | OUTPATIENT
Start: 2019-06-14 | End: 2019-08-05 | Stop reason: SDUPTHER

## 2019-06-18 ENCOUNTER — HOSPITAL ENCOUNTER (OUTPATIENT)
Dept: RADIOLOGY | Facility: HOSPITAL | Age: 79
Discharge: HOME | End: 2019-06-18
Attending: INTERNAL MEDICINE
Payer: MEDICARE

## 2019-06-18 DIAGNOSIS — Z12.39 SCREENING BREAST EXAMINATION: ICD-10-CM

## 2019-06-18 PROCEDURE — 77067 MAMMO DIGITAL SCREENING BILAT WITH TOMOSYNTHESIS_CAD: ICD-10-PCS | Mod: 26,,, | Performed by: RADIOLOGY

## 2019-06-18 PROCEDURE — 77063 MAMMO DIGITAL SCREENING BILAT WITH TOMOSYNTHESIS_CAD: ICD-10-PCS | Mod: 26,,, | Performed by: RADIOLOGY

## 2019-06-18 PROCEDURE — 77067 SCR MAMMO BI INCL CAD: CPT | Mod: 26,,, | Performed by: RADIOLOGY

## 2019-06-18 PROCEDURE — 77067 SCR MAMMO BI INCL CAD: CPT | Mod: TC

## 2019-06-18 PROCEDURE — 77063 BREAST TOMOSYNTHESIS BI: CPT | Mod: 26,,, | Performed by: RADIOLOGY

## 2019-07-05 DIAGNOSIS — I10 ESSENTIAL HYPERTENSION: ICD-10-CM

## 2019-07-05 RX ORDER — CLONIDINE HYDROCHLORIDE 0.1 MG/1
TABLET ORAL
Qty: 60 TABLET | Refills: 3 | Status: SHIPPED | OUTPATIENT
Start: 2019-07-05 | End: 2020-10-27

## 2019-08-06 RX ORDER — TRIAMTERENE AND HYDROCHLOROTHIAZIDE 37.5; 25 MG/1; MG/1
CAPSULE ORAL
Qty: 30 CAPSULE | Refills: 1 | Status: SHIPPED | OUTPATIENT
Start: 2019-08-06 | End: 2019-09-26 | Stop reason: SDUPTHER

## 2019-08-08 ENCOUNTER — PES CALL (OUTPATIENT)
Dept: ADMINISTRATIVE | Facility: CLINIC | Age: 79
End: 2019-08-08

## 2019-08-12 RX ORDER — ASPIRIN 81 MG/1
TABLET ORAL
Qty: 30 TABLET | Refills: 0 | Status: SHIPPED | OUTPATIENT
Start: 2019-08-12 | End: 2019-09-16 | Stop reason: SDUPTHER

## 2019-08-12 RX ORDER — LEVOTHYROXINE SODIUM 75 UG/1
TABLET ORAL
Qty: 90 TABLET | Refills: 0 | Status: SHIPPED | OUTPATIENT
Start: 2019-08-12 | End: 2019-11-01 | Stop reason: SDUPTHER

## 2019-09-12 ENCOUNTER — PES CALL (OUTPATIENT)
Dept: ADMINISTRATIVE | Facility: CLINIC | Age: 79
End: 2019-09-12

## 2019-09-13 ENCOUNTER — TELEPHONE (OUTPATIENT)
Dept: NEPHROLOGY | Facility: CLINIC | Age: 79
End: 2019-09-13

## 2019-09-13 NOTE — TELEPHONE ENCOUNTER
More Detail >>   Appointment Request   Daniela Harris   Sent:   8:21 PM   To: [facility] Nephro Clinical Staff    Daniela Harris   MRN: [id number] : 1940   Pt Home: [phone]     Entered: [phone]        Message     Appointment Request From: Daniela Harris      With Provider: Dinah Nick DO [[facility] - Nephrology]      Preferred Date Range: 10/1/2019 - 10/31/2019      Preferred Times: Any time      Reason for visit: check up      Comments:   Please schedule Daniela's annual visit for a Tuesday and Thursday. Thanks!   Primary Coverage (Effective 3/1/2003)     Payer Plan Group Number Group Name Effective From Effective To   MEDICARE MEDICARE PART A & B   3/1/2003    Secondary Coverage (Effective 2017)     Payer Plan Group Number Group Name Effective From Effective To   BLUE CROSS BLUE SHIELD BCBS OF [place] PLUS

## 2019-09-17 RX ORDER — ASPIRIN 81 MG/1
TABLET ORAL
Qty: 30 TABLET | Refills: 6 | Status: SHIPPED | OUTPATIENT
Start: 2019-09-17 | End: 2020-04-01

## 2019-09-26 RX ORDER — TRIAMTERENE AND HYDROCHLOROTHIAZIDE 37.5; 25 MG/1; MG/1
CAPSULE ORAL
Qty: 30 CAPSULE | Refills: 1 | Status: SHIPPED | OUTPATIENT
Start: 2019-09-26 | End: 2022-08-02 | Stop reason: ALTCHOICE

## 2019-10-04 ENCOUNTER — PATIENT OUTREACH (OUTPATIENT)
Dept: ADMINISTRATIVE | Facility: OTHER | Age: 79
End: 2019-10-04

## 2019-10-08 ENCOUNTER — OFFICE VISIT (OUTPATIENT)
Dept: OPTOMETRY | Facility: CLINIC | Age: 79
End: 2019-10-08
Payer: MEDICARE

## 2019-10-08 DIAGNOSIS — I10 HTN (HYPERTENSION), BENIGN: ICD-10-CM

## 2019-10-08 DIAGNOSIS — H35.373 EPIRETINAL MEMBRANE (ERM) OF BOTH EYES: Primary | ICD-10-CM

## 2019-10-08 DIAGNOSIS — Z96.1 PSEUDOPHAKIA: ICD-10-CM

## 2019-10-08 PROCEDURE — 99211 OFF/OP EST MAY X REQ PHY/QHP: CPT | Mod: PBBFAC,PO | Performed by: OPTOMETRIST

## 2019-10-08 PROCEDURE — 92014 PR EYE EXAM, EST PATIENT,COMPREHESV: ICD-10-PCS | Mod: S$PBB,,, | Performed by: OPTOMETRIST

## 2019-10-08 PROCEDURE — 99999 PR PBB SHADOW E&M-EST. PATIENT-LVL I: CPT | Mod: PBBFAC,,, | Performed by: OPTOMETRIST

## 2019-10-08 PROCEDURE — 99999 PR PBB SHADOW E&M-EST. PATIENT-LVL I: ICD-10-PCS | Mod: PBBFAC,,, | Performed by: OPTOMETRIST

## 2019-10-08 PROCEDURE — 92014 COMPRE OPH EXAM EST PT 1/>: CPT | Mod: S$PBB,,, | Performed by: OPTOMETRIST

## 2019-10-08 NOTE — PROGRESS NOTES
Subjective:       Patient ID: Daniela Harris is a 79 y.o. female      Chief Complaint   Patient presents with    Concerns About Ocular Health     History of Present Illness  Dls: 10/5/18 Dr. Johnson     78 y/o female presents today for ocular health check.  Pt does not wear any glasses.     No tearing  No itching  No burning  No pain  No ha's  No floaters  No flashes    Eye meds  None       Assessment/Plan:     1. Epiretinal membrane (ERM) of both eyes  Stable. Monitor     2. HTN (hypertension), benign  No hypertensive retinopathy. Continue BP control. RTC 1 year for DFE.    3. Pseudophakia - Both Eyes  Well-centered. Clear.   Declined MRx.     Follow up in about 1 year (around 10/8/2020).

## 2019-10-14 ENCOUNTER — TELEPHONE (OUTPATIENT)
Dept: NEPHROLOGY | Facility: CLINIC | Age: 79
End: 2019-10-14

## 2019-10-14 NOTE — TELEPHONE ENCOUNTER
Preferred Name:   Daniela Coello, 79 yrs, None  MRN:   [id number]  ::   1940  Lang:   English  Specialty Comments:     Length of Stay (Hrs):   None  Pt Efe Status:   Efe  Prim Cvg::   MEDICARE/MEDICARE PART A & B  Cvg Last Verified Date:   10/4/2019  Patient Types:   None  PCP:   Precious Clemente MD  Care Team:     Allergies:   Cephalexin (Reaction: Rash)  Patient Portal:   Active, 10/13/2019 9:50 PM  FYI  None  Preferred Scheduling Day(s):      More Detail >>   Appointment Request   Daniela Harris   Sent: Sun 2019  9:53 PM   To: [facility] Nephro Clinical Staff    Daniela Harris   MRN: [id number] : 1940   Pt Home: [phone]     Entered: [phone]        Message     Appointment Request From: Daniela Harris      With Provider: Dinah Nick DO [[facility] - Nephrology]      Preferred Date Range: 10/24/2019 - 2019      Preferred Times: Thursday Afternoon      Reason for visit: Existing Patient      Comments:   Please let us know availability.    Primary Coverage (Effective 3/1/2003)     Payer Plan Group Number Group Name Effective From Effective To   MEDICARE MEDICARE PART A & B   3/1/2003    Secondary Coverage (Effective 2017)     Payer Plan Group Number Group Name Effective From Effective To   [facility] BCBS OF [place] [id number]  2017    Patient Demographics     Address  [facility] [address] Fitzgerald [address] [place] [address] Phone  [phone] (Home) *Preferred*  [phone] (Mobile) E-mail Address  [email or web address]   # of No Show in Current Department:0

## 2019-10-16 ENCOUNTER — TELEPHONE (OUTPATIENT)
Dept: INTERNAL MEDICINE | Facility: CLINIC | Age: 79
End: 2019-10-16

## 2019-10-16 RX ORDER — CALCIUM CARBONATE 600 MG
TABLET ORAL
Qty: 30 TABLET | Refills: 3 | Status: SHIPPED | OUTPATIENT
Start: 2019-10-16 | End: 2021-03-02 | Stop reason: SDUPTHER

## 2019-10-16 NOTE — TELEPHONE ENCOUNTER
Spoke with Maryan from the pharmacy and informed her of Dr. Clemente message, and she stated an verbal understanding.

## 2019-10-16 NOTE — TELEPHONE ENCOUNTER
----- Message from Viraj Eric sent at 10/16/2019 11:59 AM CDT -----  Contact: [facility], Maryan [phone]  Pharmacy is calling to clarify an RX.  RX name:  Vitamin C  What do they need to clarify:    Comments: Pharmacy calling to check if the Rx has been discontinued for patient, would like response back.    Please call an advise  Thank you

## 2019-11-01 RX ORDER — LEVOTHYROXINE SODIUM 75 UG/1
TABLET ORAL
Qty: 90 TABLET | Refills: 0 | Status: SHIPPED | OUTPATIENT
Start: 2019-11-01 | End: 2020-10-19

## 2019-11-08 ENCOUNTER — TELEPHONE (OUTPATIENT)
Dept: NEPHROLOGY | Facility: CLINIC | Age: 79
End: 2019-11-08

## 2019-11-08 DIAGNOSIS — N18.30 STAGE 3 CHRONIC KIDNEY DISEASE: Primary | ICD-10-CM

## 2019-11-11 RX ORDER — FERROUS GLUCONATE 324(38)MG
TABLET ORAL
Qty: 60 TABLET | Refills: 0 | Status: SHIPPED | OUTPATIENT
Start: 2019-11-11 | End: 2020-10-27

## 2019-12-03 ENCOUNTER — LAB VISIT (OUTPATIENT)
Dept: LAB | Facility: HOSPITAL | Age: 79
End: 2019-12-03
Attending: HOSPITALIST
Payer: MEDICARE

## 2019-12-03 DIAGNOSIS — N18.30 STAGE 3 CHRONIC KIDNEY DISEASE: ICD-10-CM

## 2019-12-03 LAB
ANION GAP SERPL CALC-SCNC: 10 MMOL/L (ref 8–16)
BASOPHILS # BLD AUTO: 0.02 K/UL (ref 0–0.2)
BASOPHILS NFR BLD: 0.3 % (ref 0–1.9)
BUN SERPL-MCNC: 22 MG/DL (ref 8–23)
CALCIUM SERPL-MCNC: 9.3 MG/DL (ref 8.7–10.5)
CHLORIDE SERPL-SCNC: 107 MMOL/L (ref 95–110)
CO2 SERPL-SCNC: 28 MMOL/L (ref 23–29)
CREAT SERPL-MCNC: 1.7 MG/DL (ref 0.5–1.4)
DIFFERENTIAL METHOD: ABNORMAL
EOSINOPHIL # BLD AUTO: 0.2 K/UL (ref 0–0.5)
EOSINOPHIL NFR BLD: 2.7 % (ref 0–8)
ERYTHROCYTE [DISTWIDTH] IN BLOOD BY AUTOMATED COUNT: 13 % (ref 11.5–14.5)
EST. GFR  (AFRICAN AMERICAN): 32 ML/MIN/1.73 M^2
EST. GFR  (NON AFRICAN AMERICAN): 28 ML/MIN/1.73 M^2
GLUCOSE SERPL-MCNC: 95 MG/DL (ref 70–110)
HCT VFR BLD AUTO: 38.2 % (ref 37–48.5)
HGB BLD-MCNC: 12.1 G/DL (ref 12–16)
LYMPHOCYTES # BLD AUTO: 1.7 K/UL (ref 1–4.8)
LYMPHOCYTES NFR BLD: 21.7 % (ref 18–48)
MCH RBC QN AUTO: 30.3 PG (ref 27–31)
MCHC RBC AUTO-ENTMCNC: 31.7 G/DL (ref 32–36)
MCV RBC AUTO: 96 FL (ref 82–98)
MONOCYTES # BLD AUTO: 0.7 K/UL (ref 0.3–1)
MONOCYTES NFR BLD: 9.4 % (ref 4–15)
NEUTROPHILS # BLD AUTO: 5.1 K/UL (ref 1.8–7.7)
NEUTROPHILS NFR BLD: 65.9 % (ref 38–73)
PLATELET # BLD AUTO: 250 K/UL (ref 150–350)
PMV BLD AUTO: 11 FL (ref 9.2–12.9)
POTASSIUM SERPL-SCNC: 4.1 MMOL/L (ref 3.5–5.1)
RBC # BLD AUTO: 3.99 M/UL (ref 4–5.4)
SODIUM SERPL-SCNC: 145 MMOL/L (ref 136–145)
WBC # BLD AUTO: 7.68 K/UL (ref 3.9–12.7)

## 2019-12-03 PROCEDURE — 85025 COMPLETE CBC W/AUTO DIFF WBC: CPT

## 2019-12-03 PROCEDURE — 36415 COLL VENOUS BLD VENIPUNCTURE: CPT

## 2019-12-03 PROCEDURE — 80048 BASIC METABOLIC PNL TOTAL CA: CPT

## 2019-12-04 ENCOUNTER — PATIENT OUTREACH (OUTPATIENT)
Dept: ADMINISTRATIVE | Facility: OTHER | Age: 79
End: 2019-12-04

## 2019-12-05 ENCOUNTER — OFFICE VISIT (OUTPATIENT)
Dept: NEPHROLOGY | Facility: CLINIC | Age: 79
End: 2019-12-05
Payer: MEDICARE

## 2019-12-05 VITALS
SYSTOLIC BLOOD PRESSURE: 138 MMHG | HEIGHT: 70 IN | DIASTOLIC BLOOD PRESSURE: 82 MMHG | HEART RATE: 61 BPM | OXYGEN SATURATION: 98 % | WEIGHT: 159.63 LBS | BODY MASS INDEX: 22.85 KG/M2

## 2019-12-05 DIAGNOSIS — N18.30 KIDNEY DISEASE, CHRONIC, STAGE III (GFR 30-59 ML/MIN): ICD-10-CM

## 2019-12-05 DIAGNOSIS — I10 HTN (HYPERTENSION), BENIGN: Primary | ICD-10-CM

## 2019-12-05 PROCEDURE — 99213 PR OFFICE/OUTPT VISIT, EST, LEVL III, 20-29 MIN: ICD-10-PCS | Mod: S$PBB,GC,, | Performed by: INTERNAL MEDICINE

## 2019-12-05 PROCEDURE — 99214 OFFICE O/P EST MOD 30 MIN: CPT | Mod: PBBFAC | Performed by: HOSPITALIST

## 2019-12-05 PROCEDURE — 99999 PR PBB SHADOW E&M-EST. PATIENT-LVL IV: ICD-10-PCS | Mod: PBBFAC,GC,, | Performed by: HOSPITALIST

## 2019-12-05 PROCEDURE — 99213 OFFICE O/P EST LOW 20 MIN: CPT | Mod: S$PBB,GC,, | Performed by: INTERNAL MEDICINE

## 2019-12-05 PROCEDURE — 99999 PR PBB SHADOW E&M-EST. PATIENT-LVL IV: CPT | Mod: PBBFAC,GC,, | Performed by: HOSPITALIST

## 2019-12-05 NOTE — PROGRESS NOTES
I have personally interviewed and examined the patient. Clinical, laboratorial and imaging information available in medical records were reveiwed by me. I agree with the assessment and recommendations provided by Dr. Terrazas who was under my supervision.

## 2019-12-05 NOTE — PROGRESS NOTES
Nephrology Clinic Progress Note    Patient ID: Daniela Harris is a 79 y.o. White female who presents for follow up of CKD.    HPI:  Daniela Harris is a 79 y.o. White female with PMHx of HTN, Hypothyroidism s/p surgery for a benign MNG on 3/27/12 and HLD who was sent by her primary physician to establish care due to CKD and management of severe hypertension, patient was first seen in April of this year by Dr Hayden, her BP currently well controlled on Triamterene/HCTZ and clonidine. ACEI cancelled by primary care doctor. Patient has longstanding history of HTN, it is felt her CKD is from HTN.  Renal artery dopplers are negative, renin/aldosterone ratio and levels are negative. No recent hospitalization. Patient denies any recent illness, nausea, vomiting, diarrhea, decreased urine output or any blood on urine. Denies use of NSAIDs. She currently lives with her  of 62 years at an [facility] where they have been living since last February.     The patient denies taking NSAIDs or new antibiotics, recreational drugs, recent episode of dehydration, diarrhea, nausea or vomiting, acute illness, hospitalization or exposure to IV radiocontrast.    Past Medical History:   Diagnosis Date    After-cataract, obscuring vision - Left Eye 2/28/2014    Arthritis     Diverticulitis     Goiter     MNG    History of appendectomy     Hyperlipidemia     following diet    Hypothyroidism     s/p surgery    Osteopenia     Other and unspecified hyperlipidemia     Peptic ulcer     x 2    Psoriasis     no active illness for years    Stroke     Unspecified essential hypertension     Unspecified vitamin D deficiency     Vitamin B 12 deficiency        Family History   Problem Relation Age of Onset    Alzheimer's disease Father     Hypertension Father     Cataracts Father     Dementia Father     Diabetes Sister     Hypertension Sister     Hyperlipidemia Sister     Thyroid disease Sister      Parkinsonism Sister     Hypertension Sister     Diabetes Mother     Thyroid disease Maternal Grandmother         MNG    Thyroid disease Cousin     No Known Problems Brother     No Known Problems Maternal Aunt     Dementia Maternal Uncle     No Known Problems Paternal Aunt     No Known Problems Paternal Uncle     No Known Problems Maternal Grandfather     No Known Problems Paternal Grandmother     No Known Problems Paternal Grandfather     Amblyopia Neg Hx     Blindness Neg Hx     Cancer Neg Hx     Glaucoma Neg Hx     Macular degeneration Neg Hx     Retinal detachment Neg Hx     Strabismus Neg Hx     Stroke Neg Hx        Past Surgical History:   Procedure Laterality Date    APPENDECTOMY      CATARACT EXTRACTION      ou    CATARACT EXTRACTION W/ INTRAOCULAR LENS  IMPLANT, BILATERAL      COLONOSCOPY      ESOPHAGOGASTRODUODENOSCOPY      THYROID SURGERY      Total    TONSILLECTOMY      TONSILLECTOMY, ADENOIDECTOMY      TUBAL LIGATION           Current Outpatient Medications:     ascorbic acid, vitamin C, (VITAMIN C) 500 MG tablet, Take 1 tablet (500 mg total) by mouth once daily. 1 Tablet Oral Every day, Disp: 30 tablet, Rfl: 3    aspirin (ECOTRIN) 81 MG EC tablet, TAKE ONE TABLET BY MOUTH ONCE DAILY, Disp: 30 tablet, Rfl: 6    atorvastatin (LIPITOR) 80 MG tablet, Take 1 tablet (80 mg total) by mouth once daily., Disp: 90 tablet, Rfl: 1    ferrous gluconate (FERGON) 324 MG tablet, TAKE ONE TABLET BY MOUTH TWICE DAILY with meals., Disp: 60 tablet, Rfl: 0    multivitamin (THERAGRAN) per tablet, Take 1 tablet by mouth once daily. 1 Tablet Oral Every day, Disp: 30 tablet, Rfl: 3    triamterene-hydrochlorothiazide 37.5-25 mg (DYAZIDE) 37.5-25 mg per capsule, TAKE ONE CAPSULE BY MOUTH EVERY MORNING., Disp: 30 capsule, Rfl: 1    B COMPLEX 1 tablet, TAKE ONE TABLET BY MOUTH ONCE DAILY, Disp: 30 tablet, Rfl: 6    calcium carbonate (OS-GRIFFIN) 600 mg calcium (1,500 mg) Tab, TAKE ONE TABLET BY  MOUTH ONCE DAILY, Disp: 30 tablet, Rfl: 3    cloNIDine (CATAPRES) 0.1 MG tablet, TAKE ONE TABLET BY MOUTH TWICE DAILY, Disp: 60 tablet, Rfl: 3    glucosamine-chondroitn sulf.Na 750-400 mg Cmpk, Take 1 tablet by mouth once daily. 1  By mouth Every day, Disp: 30 each, Rfl: 3    levothyroxine (SYNTHROID) 75 MCG tablet, TAKE ONE TABLET BY MOUTH DAILY AFTER DINNER, Disp: 90 tablet, Rfl: 0    memantine (NAMENDA) 10 MG Tab, memantine 10 mg tablet  Take 1 tablet twice a day by oral route for 30 days., Disp: , Rfl:     pantoprazole (PROTONIX) 40 MG tablet, Take 1 tablet (40 mg total) by mouth once daily. prn, Disp: 30 tablet, Rfl: 11    Current Facility-Administered Medications:     cloNIDine tablet 0.1 mg, 0.1 mg, Oral, 1 time in Clinic/HOD, Pranay Brown MD    Patient's medical, family, surgical, and medication hx reviewed.    Review of Systems    Constitutional: Negative for chills, diaphoresis, fever and weight loss.   HENT: Negative for nosebleeds and tinnitus.    Eyes: Negative for blurred vision, double vision and photophobia.   Respiratory: Negative for cough and shortness of breath.    Cardiovascular: . Negative for chest pain, palpitations, swelling orthopnea and PND.   Gastrointestinal: Negative for abdominal pain, diarrhea, constipation nausea and vomiting.   Genitourinary: Negative for dysuria, flank pain, frequency, hematuria and urgency.   Musculoskeletal: Negative for back pain, falls, joint pain, myalgias and neck pain.   Skin: Negative.    Neurological: Negative for dizziness, tingling, tremors, sensory change, speech change, focal weakness, seizures, loss of consciousness, weakness and headaches.   Endo/Heme/Allergies: Negative for environmental allergies and polydipsia. Does not bruise/bleed easily.   Psychiatric/Behavioral: Negative for depression, hallucinations, memory loss, substance abuse and suicidal ideas. The patient is not nervous/anxious and does not have insomnia.        Objective:      Wt Readings from Last 3 Encounters:   12/05/19 72.4 kg (159 lb 9.8 oz)   04/03/19 64.6 kg (142 lb 6.7 oz)   12/12/18 68.1 kg (150 lb 2.1 oz)     Temp Readings from Last 3 Encounters:   03/23/17 98.4 °F (36.9 °C) (Oral)   03/24/15 97 °F (36.1 °C) (Axillary)   12/05/14 98.9 °F (37.2 °C) (Oral)     BP Readings from Last 3 Encounters:   12/05/19 138/82   04/03/19 134/62   02/12/19 (!) 132/54     Pulse Readings from Last 3 Encounters:   12/05/19 61   04/03/19 (!) 52   02/12/19 (!) 51        Physical Exam    Constitutional:  well-developed and well-nourished. No distress.   HENT:   Head: Normocephalic and atraumatic.   Neck: Normal range of motion. Neck supple.   Cardiovascular: Normal rate, regular rhythm, normal heart sounds and intact distal pulses.  Exam reveals no gallop and no friction rub.    No murmur heard.  Pulmonary/Chest: Effort normal and breath sounds normal. No respiratory distress. no wheezes, no rales, no tenderness.   Abdominal: Soft. Bowel sounds are normal, no distension. There is no tenderness. There is no rebound and no guarding.   Musculoskeletal: Normal range of motion. No edema or deformity.   Neurological: Awake alert and oriented x 4. Motor strength preserved 5/5. DTR symmetrical.  Skin: Skin is warm and dry. No rash noted. She is not diaphoretic. No erythema. No pallor.       Assessment:       No diagnosis found.     Plan:   1. CKD stage IV most likely hypertensive nephropathy  Creatinine has improved since last visit, previous one at 2.0  Patient has good urine output  UPCR normal  No acid base disturbances      Baseline Creatinine:  Lab Results   Component Value Date    CREATININE 1.7 (H) 12/03/2019       Urine Protein:   Prot/Creat Ratio, Ur   Date Value Ref Range Status   12/03/2019 0.13 0.00 - 0.20 Final   04/02/2019 Unable to calculate 0.00 - 0.20 Final   02/12/2019 0.07 0.00 - 0.20 Final       Acid-Base:   Lab Results   Component Value Date     12/03/2019    K 4.1 12/03/2019     CO2 28 12/03/2019       2. HTN: Blood pressures 138/82, cont clonidine, triamterene, HCTZ      3. DM:  Last HbA1C   Lab Results   Component Value Date    HGBA1C 5.5 04/11/2018   Patient not diabetic     4. CKD-MBD:    Lab Results   Component Value Date    PTH 21.0 02/12/2019    CALCIUM 9.3 12/03/2019    PHOS 4.5 04/02/2019   Last PTH normal     5. Anemia:   Lab Results   Component Value Date    HGB 12.1 12/03/2019     Lab Results   Component Value Date    IRON 118 09/01/2018    TIBC 326 09/01/2018    FERRITIN 173 09/01/2018       6. Lipid management:   Lab Results   Component Value Date    LDLCALC 79.4 04/11/2018   cont Lipitor     7.   ESRD planing: do not anticipate a need at this time    Follow up in with labs and Urine    Jacky Hunt MD  Nephrology Fellow PGY4  Department of Nephrology and Hypertension  [facility]

## 2019-12-09 ENCOUNTER — TELEPHONE (OUTPATIENT)
Dept: INTERNAL MEDICINE | Facility: CLINIC | Age: 79
End: 2019-12-09

## 2019-12-09 NOTE — TELEPHONE ENCOUNTER
----- Message from Karla Manzo sent at 12/9/2019 12:21 PM CST -----  Contact: Lauren Styles  [phone]   Patient has a runey nose and is complaining. Has a temp of  98.7.  Not coughing up anything.  Request call back. Will be there until 3:00pm and back tomorrow.    Please call and advise  Thank you

## 2020-03-21 ENCOUNTER — HOSPITAL ENCOUNTER (EMERGENCY)
Facility: HOSPITAL | Age: 80
Discharge: OTHER INSTITUTION | End: 2020-03-22
Attending: EMERGENCY MEDICINE
Payer: MEDICARE

## 2020-03-21 ENCOUNTER — TELEPHONE (OUTPATIENT)
Dept: INTERNAL MEDICINE | Facility: CLINIC | Age: 80
End: 2020-03-21

## 2020-03-21 DIAGNOSIS — R50.9 LOW GRADE FEVER: Primary | ICD-10-CM

## 2020-03-21 PROCEDURE — 99283 EMERGENCY DEPT VISIT LOW MDM: CPT

## 2020-03-21 NOTE — TELEPHONE ENCOUNTER
----- Message from Nahomy De sent at 3/21/2020  1:24 PM CDT -----  Contact: Nurse Gabby from [facility]  Living Assistant call to inform that Pt has low grade fever 99.8-99.1 & Pt will stay in room until get authorization from . Pt is clear to come out.    Facility can be reached at [phone]

## 2020-03-22 VITALS
BODY MASS INDEX: 22.76 KG/M2 | RESPIRATION RATE: 18 BRPM | SYSTOLIC BLOOD PRESSURE: 167 MMHG | HEART RATE: 79 BPM | TEMPERATURE: 99 F | DIASTOLIC BLOOD PRESSURE: 70 MMHG | HEIGHT: 70 IN | WEIGHT: 159 LBS | OXYGEN SATURATION: 98 %

## 2020-03-22 LAB
BILIRUB UR QL STRIP: NEGATIVE
CLARITY UR: CLEAR
COLOR UR: YELLOW
GLUCOSE UR QL STRIP: NEGATIVE
HGB UR QL STRIP: ABNORMAL
INFLUENZA A, MOLECULAR: NEGATIVE
INFLUENZA B, MOLECULAR: NEGATIVE
KETONES UR QL STRIP: NEGATIVE
LEUKOCYTE ESTERASE UR QL STRIP: NEGATIVE
NITRITE UR QL STRIP: NEGATIVE
PH UR STRIP: 6 [PH] (ref 5–8)
PROT UR QL STRIP: NEGATIVE
SP GR UR STRIP: 1.01 (ref 1–1.03)
SPECIMEN SOURCE: NORMAL
URN SPEC COLLECT METH UR: ABNORMAL
UROBILINOGEN UR STRIP-ACNC: NEGATIVE EU/DL

## 2020-03-22 PROCEDURE — 81003 URINALYSIS AUTO W/O SCOPE: CPT

## 2020-03-22 PROCEDURE — 87502 INFLUENZA DNA AMP PROBE: CPT

## 2020-03-22 NOTE — ED NOTES
Spoke with patient's son and daughter-in-law to give them an update on patient. Let then know that we did a flu swab, chest xray and urine. Awaiting results on all test.

## 2020-03-22 NOTE — ED PROVIDER NOTES
Encounter Date: 3/21/2020    SCRIBE #1 NOTE: I, Debra Brown, am scribing for, and in the presence of,  Dr. Mota. I have scribed the entire note.       History     Chief Complaint   Patient presents with    Fever     Patient presents to the ED via [facility] from assisted living facility [facility]. Patient reports having fever. Denies any cough or congestion. States  with fever as well.      This is a 79 y.o. female who has a past medical history of After-cataract, obscuring vision - Left Eye (2/28/2014), Arthritis, Diverticulitis, Goiter, History of appendectomy, Hyperlipidemia, Hypothyroidism, Osteopenia, Other and unspecified hyperlipidemia, Peptic ulcer, Psoriasis, Stroke, Unspecified essential hypertension, Unspecified vitamin D deficiency, and Vitamin B 12 deficiency.     The patient presents to the Emergency Department with fever.   The patient's symptoms began just prior to arrival in the ED.   She is a resident of [facility] and staff found the patient to have a temperature of 99.1  Pt denies cough, congestion, shortness of breath, sore throat, body aches, nausea, vomiting or diarrhea.   The patient did not receive any Tylenol or Motrin for the symptoms.  She has positive sick contact of her spouse who had a fever as well.       The history is provided by the patient.     Review of patient's allergies indicates:   Allergen Reactions    Cephalexin Rash     Other reaction(s): Rash     Past Medical History:   Diagnosis Date    After-cataract, obscuring vision - Left Eye 2/28/2014    Arthritis     Diverticulitis     Goiter     MNG    History of appendectomy     Hyperlipidemia     following diet    Hypothyroidism     s/p surgery    Osteopenia     Other and unspecified hyperlipidemia     Peptic ulcer     x 2    Psoriasis     no active illness for years    Stroke     Unspecified essential hypertension     Unspecified vitamin D deficiency     Vitamin B 12 deficiency      Past  Surgical History:   Procedure Laterality Date    APPENDECTOMY      CATARACT EXTRACTION      ou    CATARACT EXTRACTION W/ INTRAOCULAR LENS  IMPLANT, BILATERAL      COLONOSCOPY      ESOPHAGOGASTRODUODENOSCOPY      THYROID SURGERY      Total    TONSILLECTOMY      TONSILLECTOMY, ADENOIDECTOMY      TUBAL LIGATION       Family History   Problem Relation Age of Onset    Alzheimer's disease Father     Hypertension Father     Cataracts Father     Dementia Father     Diabetes Sister     Hypertension Sister     Hyperlipidemia Sister     Thyroid disease Sister     Parkinsonism Sister     Hypertension Sister     Diabetes Mother     Thyroid disease Maternal Grandmother         MNG    Thyroid disease Cousin     No Known Problems Brother     No Known Problems Maternal Aunt     Dementia Maternal Uncle     No Known Problems Paternal Aunt     No Known Problems Paternal Uncle     No Known Problems Maternal Grandfather     No Known Problems Paternal Grandmother     No Known Problems Paternal Grandfather     Amblyopia Neg Hx     Blindness Neg Hx     Cancer Neg Hx     Glaucoma Neg Hx     Macular degeneration Neg Hx     Retinal detachment Neg Hx     Strabismus Neg Hx     Stroke Neg Hx      Social History     Tobacco Use    Smoking status: Never Smoker    Smokeless tobacco: Never Used   Substance Use Topics    Alcohol use: No    Drug use: No     Review of Systems   Unable to perform ROS: Dementia       Physical Exam     Initial Vitals [03/21/20 2323]   BP Pulse Resp Temp SpO2   (!) 144/76 64 18 99.3 °F (37.4 °C) 98 %      MAP       --         Physical Exam    Nursing note and vitals reviewed.  Constitutional: She appears well-developed and well-nourished. She is not diaphoretic. No distress.   HENT:   Head: Normocephalic and atraumatic.   Mouth/Throat: Oropharynx is clear and moist.   Eyes: Conjunctivae and EOM are normal.   Neck: Normal range of motion. Neck supple.   Cardiovascular: Normal rate,  regular rhythm and normal heart sounds. Exam reveals no gallop and no friction rub.    No murmur heard.  Pulmonary/Chest: Breath sounds normal. She has no wheezes. She has no rhonchi. She has no rales.   Abdominal: Soft. Bowel sounds are normal. She exhibits no distension. There is no tenderness.   Musculoskeletal: Normal range of motion. She exhibits no edema or tenderness.   Lymphadenopathy:     She has no cervical adenopathy.   Neurological: She is alert. She has normal strength. GCS score is 15. GCS eye subscore is 4. GCS verbal subscore is 5. GCS motor subscore is 6.   Oriented x2   Skin: Skin is warm and dry. No rash noted.   Psychiatric: She has a normal mood and affect.         ED Course   Procedures  Labs Reviewed   URINALYSIS, REFLEX TO URINE CULTURE - Abnormal; Notable for the following components:       Result Value    Occult Blood UA Trace (*)     All other components within normal limits    Narrative:     Preferred Collection Type->Urine, Clean Catch   INFLUENZA A & B BY MOLECULAR          Imaging Results          X-Ray Chest AP Portable (Final result)  Result time 03/22/20 01:35:45    Final result by Dereje Friedman MD (03/22/20 01:35:45)                 Impression:      Coarse accentuated interstitial lung markings bilaterally which may relate to viral respiratory illness, interstitial edema, or reactive airways disease.  No confluent airspace consolidation appreciated.      Electronically signed by: Dereje Friedman MD  Date:    03/22/2020  Time:    01:35             Narrative:    EXAMINATION:  XR CHEST AP PORTABLE    CLINICAL HISTORY:  Cough    TECHNIQUE:  Single frontal view of the chest was performed.    COMPARISON:  None    FINDINGS:  Cardiomediastinal silhouette appears within normal limits.  There is atherosclerotic calcification of the thoracic aorta.  The lungs appear symmetrically expanded.  There are coarse accentuated interstitial lung markings bilaterally.  No large confluent airspace  consolidation appreciated.  No evidence of significant pleural effusion or pneumothorax.  Osseous structures demonstrate degenerative changes.                                 Medical Decision Making:   Initial Assessment:   Patient was seen in the Emergency Department with low-grade temperature without any further complaints.   The patient was tested for flu and was negative. CXR showed nonspecific coarse interstitial markings. O2 sats high 90's, no respiratory distress. UA testing was unremarkable.     Based on the most recent recommendations from our hospital administration/infectious disease department at this time, the patient does NOT meet criteria for COVID-19 testing. Vital signs do not indicate sepsis, hypoxia nor respiratory distress, and in my professional opinion the patient is well enough for discharge. The patient was provided with discharge instructions on how to quarantine at home.  Clinical Tests:   Lab Tests: Ordered and Reviewed  Radiological Study: Ordered and Reviewed                   ED Course as of Mar 23 2145   Sun Mar 22, 2020   0204 Leukocytes, UA: Negative [NP]   0204 UROBILINOGEN UA: Negative [NP]   0204 NITRITE UA: Negative [NP]   0204 Occult Blood UA(!): Trace [NP]   0204 Bilirubin (UA): Negative [NP]   0204 Ketones, UA: Negative [NP]   0235 Influenza A, Molecular: Negative [NP]   0235 Influenza B, Molecular: Negative [NP]      ED Course User Index  [NP] Kyle Mota MD                Clinical Impression:       ICD-10-CM ICD-9-CM   1. Low grade fever R50.9 780.60           ED Disposition Condition    Discharge Stable        ED Prescriptions     None        Follow-up Information     Follow up With Specialties Details Why Contact Info    Precious Clemente MD Internal Medicine   [address] [place] [id number]  [phone]                        Scribe attestation:  I, Dr. Kyle Mota, personally performed the services described in this documentation.   All medical record  entries made by the scribe were at my direction and in my presence.   I have reviewed the chart and agree that the record is accurate and complete.   Kyle Mota MD.              Kyle Mota MD  03/23/20 [id number]

## 2020-03-22 NOTE — ED NOTES
Pt given instructions for clean catch urine sample. Pt verbalized understanding.  Pt ambulatory to bathroom with assist by RN.

## 2020-03-22 NOTE — DISCHARGE INSTRUCTIONS
Pt has no fever in the ED, no hx of shortness of breath or cough. Does not qualify to be tested for COVID at this time per our most current recommendations from the [facility] system ID team and the [facility] dept of public health.    Nevertheless, make sure you quarantine yourself away from the rest of your family.  You should be in a separate room.  You should not be in any common area.  You should use the separate bathroom if possible.  Make sure you clean your entire house with antiviral  such as bleach or Lysol, including common touch points like door handles and sinks.    Monitor for worsening symptoms, fever, cough, shortness of breath, body aches. Consult with a physician if any of these occur.

## 2020-03-22 NOTE — ED NOTES
Pt presents to the ED via ems secondary to possible fever. Pt states she does not know why ems brought her. Pt denies fever, SOB, congestion, cough, chest pain. Pt denies recent sick contacts

## 2020-03-22 NOTE — ED NOTES
Review of patient's allergies indicates:   Allergen Reactions    Cephalexin Rash     Other reaction(s): Rash        Patient has verified the spelling of their name and  on armband.   APPEARANCE: Patient is alert, calm, oriented x 3, and does not appear distressed.  SKIN: Skin is normal for race, warm, and dry. Normal skin turgor and mucous membranes moist.  CARDIAC: Normal rate and rhythm, no murmur heard.   RESPIRATORY:Normal rate and effort. Breath sounds clear bilaterally throughout chest. Respirations are equal and unlabored.    GASTRO: Bowel sounds normal, abdomen is soft, no tenderness, and no abdominal distention.  MUSCLE: Full ROM. No bony tenderness or soft tissue tenderness. No obvious deformity.  PERIPHERAL VASCULAR: peripheral pulses present. Normal cap refill. No edema. Warm to touch.  NEURO:Dannie coma scale: eyes open spontaneously-4, oriented & converses-5, obeys commands-6. No neurological abnormalities.   MENTAL STATUS: awake, alert and follows commands , but has a hx of dementia with mild confusion noted as to why she is here.   EYE: No overt deficits noted. No drainage. Sclera WNL  ENT: EARS: no obvious drainage. NOSE: no active bleeding. THROAT: no redness or swelling.  BREAST: symmetrical. No masses. No tenderness.  : Voids without complication but wears a brief for occasional incontinent episodes  RESP: No cough noted. Resp even and unlabored. Lung sounds clear. No sinus issues noted or reported.

## 2020-03-24 NOTE — TELEPHONE ENCOUNTER
Spoke with Gabby at nursing home.  Patient has been quarantined.  She also was sent to hospital to be tested for Covid-19

## 2020-04-01 RX ORDER — ASPIRIN 81 MG/1
TABLET ORAL
Qty: 30 TABLET | Refills: 6 | Status: SHIPPED | OUTPATIENT
Start: 2020-04-01 | End: 2020-10-27

## 2020-12-09 ENCOUNTER — TELEPHONE (OUTPATIENT)
Dept: OPTOMETRY | Facility: CLINIC | Age: 80
End: 2020-12-09

## 2020-12-10 ENCOUNTER — OFFICE VISIT (OUTPATIENT)
Dept: OPTOMETRY | Facility: CLINIC | Age: 80
End: 2020-12-10
Payer: MEDICARE

## 2020-12-10 DIAGNOSIS — F02.80 LATE ONSET ALZHEIMER'S DISEASE WITHOUT BEHAVIORAL DISTURBANCE: ICD-10-CM

## 2020-12-10 DIAGNOSIS — G30.1 LATE ONSET ALZHEIMER'S DISEASE WITHOUT BEHAVIORAL DISTURBANCE: ICD-10-CM

## 2020-12-10 DIAGNOSIS — H10.523 ANGULAR BLEPHAROCONJUNCTIVITIS OF BOTH EYES: Primary | ICD-10-CM

## 2020-12-10 DIAGNOSIS — H35.373 EPIRETINAL MEMBRANE (ERM) OF BOTH EYES: ICD-10-CM

## 2020-12-10 DIAGNOSIS — Z96.1 PSEUDOPHAKIA: ICD-10-CM

## 2020-12-10 PROCEDURE — 99999 PR PBB SHADOW E&M-EST. PATIENT-LVL III: CPT | Mod: PBBFAC,,, | Performed by: OPTOMETRIST

## 2020-12-10 PROCEDURE — 99999 PR PBB SHADOW E&M-EST. PATIENT-LVL III: ICD-10-PCS | Mod: PBBFAC,,, | Performed by: OPTOMETRIST

## 2020-12-10 PROCEDURE — 92014 PR EYE EXAM, EST PATIENT,COMPREHESV: ICD-10-PCS | Mod: S$PBB,,, | Performed by: OPTOMETRIST

## 2020-12-10 PROCEDURE — 99213 OFFICE O/P EST LOW 20 MIN: CPT | Mod: PBBFAC,PO | Performed by: OPTOMETRIST

## 2020-12-10 PROCEDURE — 92014 COMPRE OPH EXAM EST PT 1/>: CPT | Mod: S$PBB,,, | Performed by: OPTOMETRIST

## 2020-12-10 RX ORDER — NEOMYCIN SULFATE, POLYMYXIN B SULFATE AND DEXAMETHASONE 3.5; 10000; 1 MG/ML; [USP'U]/ML; MG/ML
1 SUSPENSION/ DROPS OPHTHALMIC 4 TIMES DAILY
Qty: 5 ML | Refills: 0 | Status: SHIPPED | OUTPATIENT
Start: 2020-12-10 | End: 2020-12-17

## 2020-12-10 NOTE — PROGRESS NOTES
HPI     ELVIRA:10/2019  Glasses? no  Contacts? no  H/o eye surgery, injections or laser: cataract sx OU   H/o eye injury: no  Known eye conditions? no  Family h/o eye conditions? no  Eye gtts?no    (-) Flashes (-) Floaters (+) Mucous OD >OS   (-) Tearing (+) Itching OD  (- Burning   (-) Headaches (-) Eye Pain/discomfort (+) Irritation   (-) Redness (-) Double vision (+) Blurry vision distance OU     Diabetic? (-)  A1c?  (Hemoglobin A1C       Date                     Value               Ref Range             Status                04/11/2018               5.5                 4.0 - 5.6 %           Final              Comment:    According to ADA guidelines, hemoglobin A1c <7.0% represents  optimal   control in non-pregnant diabetic patients. Different  metrics may apply to   specific patient populations.   Standards of Medical Care in   Diabetes-2016.  For the purpose of screening for the presence of   diabetes:  <5.7%     Consistent with the absence of diabetes  5.7-6.4%    Consistent with increasing risk for diabetes   (prediabetes)  >or=6.5%    Consistent with diabetes  Currently, no consensus exists for use of   hemoglobin A1c  for diagnosis of diabetes for children.  This Hemoglobin   A1c assay has significant interference with fetal   hemoglobin   (HbF).   The results are invalid for patients with abnormal amounts of   HbF,     including those with known Hereditary Persistence   of Fetal Hemoglobin.   Heterozygous hemoglobin variants (HbAS, HbAC,   HbAD, HbAE, HbA2) do not   significantly interfere with this assay;   however, presence of multiple   variants in a sample may impact the %   interference.         08/16/2017               5.3                 4.0 - 5.6 %           Final              Comment:    According to ADA guidelines, hemoglobin A1c <7.0% represents  optimal   control in non-pregnant diabetic patients. Different  metrics may apply to   specific patient populations.   Standards of Medical Care in    Diabetes-2016.  For the purpose of screening for the presence of   diabetes:  <5.7%     Consistent with the absence of diabetes  5.7-6.4%    Consistent with increasing risk for diabetes   (prediabetes)  >or=6.5%    Consistent with diabetes  Currently, no consensus exists for use of   hemoglobin A1c  for diagnosis of diabetes for children.  This Hemoglobin   A1c assay has significant interference with fetal   hemoglobin   (HbF).   The results are invalid for patients with abnormal amounts of   HbF,     including those with known Hereditary Persistence   of Fetal Hemoglobin.   Heterozygous hemoglobin variants (HbAS, HbAC,   HbAD, HbAE, HbA2) do not   significantly interfere with this assay;   however, presence of multiple   variants in a sample may impact the %   interference.         02/20/2017               5.5                 4.5 - 6.2 %           Final              Comment:    According to ADA guidelines, hemoglobin A1C <7.0% represents  optimal   control in non-pregnant diabetic patients.  Different  metrics may apply   to specific populations.   Standards of Medical Care in Diabetes -   2016.  For the purpose of screening for the presence of diabetes:  <5.7%       Consistent with the absence of diabetes  5.7-6.4%  Consistent with   increasing risk for diabetes   (prediabetes)  >or=6.5%  Consistent with   diabetes  Currently no consensus exists for use of hemoglobin A1C  for   diagnosis of diabetes for children.    ----------)        Last edited by Sho Muir on 12/10/2020  1:28 PM. (History)            Assessment /Plan     For exam results, see Encounter Report.    Angular blepharoconjunctivitis of both eyes  -     neomycin-polymyxin-dexamethasone (MAXITROL) 3.5mg/mL-10,000 unit/mL-0.1 % DrpS; Place 1 drop into both eyes 4 (four) times daily. for 7 days  Dispense: 5 mL; Refill: 0    Pseudophakia    Epiretinal membrane (ERM) of both eyes    Late onset Alzheimer's disease without behavioral  disturbance      1. Rx Maxitrol QID OU x 7 days.   2. Good result.   3. Stable. MOnitor.   4. Declined refraction. Pt is happy w/vision. Pt unable to do CVF

## 2020-12-22 ENCOUNTER — PES CALL (OUTPATIENT)
Dept: ADMINISTRATIVE | Facility: CLINIC | Age: 80
End: 2020-12-22

## 2021-12-09 PROBLEM — Z74.09 IMPAIRED MOBILITY: Status: ACTIVE | Noted: 2021-12-09

## 2022-04-21 ENCOUNTER — HOSPITAL ENCOUNTER (OUTPATIENT)
Dept: RADIOLOGY | Facility: HOSPITAL | Age: 82
Discharge: HOME | End: 2022-04-21
Attending: FAMILY MEDICINE
Payer: MEDICARE

## 2022-04-21 DIAGNOSIS — R05.9 COUGH: ICD-10-CM

## 2022-04-21 PROCEDURE — 71046 X-RAY EXAM CHEST 2 VIEWS: CPT | Mod: 26,,, | Performed by: RADIOLOGY

## 2022-04-21 PROCEDURE — 71046 XR CHEST PA AND LATERAL: ICD-10-PCS | Mod: 26,,, | Performed by: RADIOLOGY

## 2022-04-21 PROCEDURE — 71046 X-RAY EXAM CHEST 2 VIEWS: CPT | Mod: TC,FY

## 2022-04-28 ENCOUNTER — TELEPHONE (OUTPATIENT)
Dept: NEUROLOGY | Facility: CLINIC | Age: 82
End: 2022-04-28
Payer: MEDICARE

## 2022-04-28 NOTE — TELEPHONE ENCOUNTER
----- Message from Britt Barnes, Patient Care Assistant sent at 4/28/2022 10:51 AM CDT -----  Type:  Needs Medical Advice    Who Called:  pt granddaughter   (please be specific): Granddaughter would like a call back to discuss getting the pt schedule to [facility], pt do have a referral to see the provider  Would the patient rather a call back or a response via [facility]?  call  Best Call Back Number:  [phone]  Additional Information:

## 2022-07-24 ENCOUNTER — HOSPITAL ENCOUNTER (EMERGENCY)
Facility: HOSPITAL | Age: 82
Discharge: HOME | End: 2022-07-24
Attending: EMERGENCY MEDICINE
Payer: MEDICARE

## 2022-07-24 VITALS
RESPIRATION RATE: 18 BRPM | HEART RATE: 83 BPM | TEMPERATURE: 98 F | DIASTOLIC BLOOD PRESSURE: 89 MMHG | OXYGEN SATURATION: 97 % | SYSTOLIC BLOOD PRESSURE: 138 MMHG

## 2022-07-24 DIAGNOSIS — S20.222A CONTUSION OF LEFT SIDE OF BACK, INITIAL ENCOUNTER: Primary | ICD-10-CM

## 2022-07-24 DIAGNOSIS — S32.050A COMPRESSION FRACTURE OF L5 VERTEBRA, INITIAL ENCOUNTER: ICD-10-CM

## 2022-07-24 DIAGNOSIS — T14.8XXA CONTUSION: ICD-10-CM

## 2022-07-24 PROCEDURE — 99283 EMERGENCY DEPT VISIT LOW MDM: CPT

## 2022-07-24 NOTE — ED PROVIDER NOTES
Encounter Date: 7/24/2022       History     Chief Complaint   Patient presents with    Tailbone Pain     Pt sent from [facility] for evaluation of bruise to lower back/tailbone area. Pt has Alzheimer's and uses a walker for ambulation. [facility] denies any recent falls or traumas and requested pt's son bring pt to ER for xray. Pt has no complaints.     82-year-old female presents to the ED for evaluation of a bruise to her lower back noted today.  The patient's son states that the nursing home requested that she come to the ED for x-rays.  They are unaware of any trauma.  The patient has history of dementia.  Per son, the patient is at her mental baseline.  The patient usually uses a walker for ambulation.  No other complaints at this time.    The history is provided by a relative.     Review of patient's allergies indicates:   Allergen Reactions    Cephalexin Rash     Other reaction(s): Rash     Past Medical History:   Diagnosis Date    After-cataract, obscuring vision - Left Eye 2/28/2014    Arthritis     Diverticulitis     Goiter     MNG    History of appendectomy     Hyperlipidemia     following diet    Hypothyroidism     s/p surgery    Osteopenia     Other and unspecified hyperlipidemia     Peptic ulcer     x 2    Psoriasis     no active illness for years    Stroke     Unspecified essential hypertension     Unspecified vitamin D deficiency     Vitamin B 12 deficiency      Past Surgical History:   Procedure Laterality Date    APPENDECTOMY      CATARACT EXTRACTION      ou    CATARACT EXTRACTION W/ INTRAOCULAR LENS  IMPLANT, BILATERAL      COLONOSCOPY      ESOPHAGOGASTRODUODENOSCOPY      THYROID SURGERY      Total    TONSILLECTOMY      TONSILLECTOMY, ADENOIDECTOMY      TUBAL LIGATION       Family History   Problem Relation Age of Onset    Alzheimer's disease Father     Hypertension Father     Cataracts Father     Dementia Father     Diabetes Sister     Hypertension Sister      Hyperlipidemia Sister     Thyroid disease Sister     Parkinsonism Sister     Hypertension Sister     Diabetes Mother     Thyroid disease Maternal Grandmother         MNG    Thyroid disease Cousin     No Known Problems Brother     No Known Problems Maternal Aunt     Dementia Maternal Uncle     No Known Problems Paternal Aunt     No Known Problems Paternal Uncle     No Known Problems Maternal Grandfather     No Known Problems Paternal Grandmother     No Known Problems Paternal Grandfather     Amblyopia Neg Hx     Blindness Neg Hx     Cancer Neg Hx     Glaucoma Neg Hx     Macular degeneration Neg Hx     Retinal detachment Neg Hx     Strabismus Neg Hx     Stroke Neg Hx      Social History     Tobacco Use    Smoking status: Never Smoker    Smokeless tobacco: Never Used   Substance Use Topics    Alcohol use: No    Drug use: No     Review of Systems   Unable to perform ROS: Dementia       Physical Exam     Initial Vitals [07/24/22 1633]   BP Pulse Resp Temp SpO2   138/89 83 18 98.3 °F (36.8 °C) 97 %      MAP       --         Physical Exam    Nursing note and vitals reviewed.  Constitutional: Vital signs are normal. She appears well-developed and well-nourished. She is active and cooperative.  Non-toxic appearance. She does not have a sickly appearance. She does not appear ill.   HENT:   Head: Normocephalic and atraumatic.   No signs of head trauma.    Eyes: Conjunctivae and EOM are normal.   Neck: Phonation normal. Neck supple.   Normal range of motion.   Full passive range of motion without pain.     Cardiovascular: Normal rate and regular rhythm.   Pulmonary/Chest: Effort normal and breath sounds normal.   Abdominal: Abdomen is soft. Bowel sounds are normal.   Musculoskeletal:      Cervical back: Normal, full passive range of motion without pain, normal range of motion and neck supple. No spinous process tenderness or muscular tenderness.      Thoracic back: Normal.      Lumbar back: Signs  of trauma present. No swelling, edema, deformity, lacerations, spasms, tenderness or bony tenderness. Normal range of motion. Negative right straight leg raise test and negative left straight leg raise test. No scoliosis.        Back:       Comments: No TTP, crepitus, deformity, or stepoff.      Neurological: She is alert and oriented to person, place, and time. She has normal strength. GCS eye subscore is 4. GCS verbal subscore is 5. GCS motor subscore is 6.   Skin: Skin is warm, dry and intact. Capillary refill takes less than 2 seconds. Bruising noted. No abrasion and no rash noted. No erythema.   Psychiatric: She has a normal mood and affect. Her speech is normal and behavior is normal. Judgment and thought content normal. Cognition and memory are normal.         ED Course   Procedures  Labs Reviewed - No data to display       Imaging Results          X-Ray Sacrum And Coccyx (Final result)  Result time 07/24/22 18:20:42    Final result by Franco Roberts MD (07/24/22 18:20:42)                 Impression:      No acute process.      Electronically signed by: Franco Roberts MD  Date:    07/24/2022  Time:    18:20             Narrative:    EXAMINATION:  XR SACRUM AND COCCYX    CLINICAL HISTORY:  Other injury of unspecified body region, initial encounter    TECHNIQUE:  Two or three views of the sacrum and coccyx were performed.    COMPARISON:  None    FINDINGS:  There is demineralization of the osseous structures.  There is no cortical step-off.  There is no evidence of periostitis.    There is joint space narrowing.  There are extensive calcifications of the abdominal aorta and its branch vessels.    There is no evidence of acute fracture or malalignment of the sacrum/coccyx.                               X-Ray Lumbar Spine Ap And Lateral (Final result)  Result time 07/24/22 18:32:33    Final result by Corky Cesar DO (07/24/22 18:32:33)                 Impression:      Age-indeterminate compression fracture of the  L5 vertebral body, correlate with point tenderness to assess acuity      Electronically signed by: Corky Cesar  Date:    07/24/2022  Time:    18:32             Narrative:    EXAMINATION:  XR LUMBAR SPINE AP AND LATERAL    CLINICAL HISTORY:  contusion;    TECHNIQUE:  AP, lateral and spot images were performed of the lumbar spine.    COMPARISON:  CT of the abdomen and pelvis from 06/12/2017.    FINDINGS:  There is diffuse osteopenia and dextroconvex curvature of the lumbar spine which significantly limits evaluation.    There is a suspected mild compression deformity of the L5 vertebral body, with mild height loss (approximately 20-30%), age indeterminate though not seen on CT from 06/12/2017.  There are endplate degenerative changes at L3-L4 anteriorly.  There are multilevel degenerative changes of the lumbar spine.  Remaining visualized osseous structures are intact.  There are vascular calcifications.                                 Medications - No data to display  Medical Decision Making:   History:   I obtained history from: someone other than patient.       <> Summary of History: Son  Initial Assessment:   83yo female here from [facility] due to bruising noted on her sacrum today.  There is no known trauma.  Per son, the patient is at her mental baseline.  The patient is well appearing.  Vitals stable.  No signs of head trauma.  Neck normal.  She has a small bruise to her left sacral area.  No tenderness to palpation.  No tenderness to palpation, crepitus, or step-off.  No midline spinal tenderness.  Differential Diagnosis:   Contusion, fracture  Clinical Tests:   Radiological Study: Ordered and Reviewed  ED Management:  X-ray L-spine and coccyx/sacrum.    The patient's x-ray reveals an age-indeterminate L5 compression fracture.  Her pain appears well controlled.  The patient has no midline bony tenderness.  I discussed this finding with the patient's son.  The patient's son does not believe that she  "fell recently because "someone would have had to pick her up off the floor and no one had to do that."  I offered additional imaging including CT of head and C-spine since patient is unable to tell us what happened.  Pt's son declined, stating that he does not feel it is necessary.  I also offered labs and EKG and pt's son again declined.  Pt's son states that pt is at her mental baseline and he feels comfortable with her DC back to NH at this time.  He states that she has f/u with PCP in two days.  Pt will be DC'd back to NH.  Pt to follow up with PCP within 2 days.  I reviewed strict return precautions. In addition, pt is to return to the ED if condition changes, progresses, or if there are any concerns.  Pt's son verbalized understanding, compliance, and agreement with the treatment plan.      Reviewed with  who agrees with ED course and disposition.                         Clinical Impression:   Final diagnoses:  [T14.8XXA] Contusion  [S20.222A] Contusion of left side of back, initial encounter (Primary)  [S32.050A] Compression fracture of L5 vertebra, initial encounter          ED Disposition Condition    Discharge Stable        ED Prescriptions     None        Follow-up Information     Follow up With Specialties Details Why Contact Info    Tayo Varela MD Family Medicine Schedule an appointment as soon as possible for a visit in 1 week  [address] [facility]  [address] [place] [address] [id number] [phone]             Luly Valenzuela, ROSALIE  07/24/22 1914    "

## 2022-07-24 NOTE — DISCHARGE INSTRUCTIONS
The fracture in 's back may be old or new.  There is nothing to do for the fracture at this time other than ensure she has pain control.     Return to the ED if your condition changes, progresses, or if you have any concerns.      Thank you for choosing [facility]! We appreciate you coming to us for your medical care. We hope you feel better soon! Please come back to [facility] for all of your future medical needs.      Our goal in the emergency department is to always give you outstanding care and exceptional service. You may receive a survey by mail or e-mail in the next week regarding your experience in our ED. We would greatly appreciate your completing and returning the survey. Your feedback provides us with a way to recognize our staff who give very good care and it helps us learn how to improve when your experience was below our aspiration of excellence.      Sincerely,  SAM Sotelo  [facility]

## 2022-07-25 ENCOUNTER — LAB VISIT (OUTPATIENT)
Dept: LAB | Facility: HOSPITAL | Age: 82
End: 2022-07-25
Attending: PSYCHIATRY & NEUROLOGY
Payer: MEDICARE

## 2022-07-25 ENCOUNTER — OFFICE VISIT (OUTPATIENT)
Dept: NEUROLOGY | Facility: CLINIC | Age: 82
End: 2022-07-25
Payer: MEDICARE

## 2022-07-25 VITALS
BODY MASS INDEX: 25.77 KG/M2 | HEIGHT: 70 IN | SYSTOLIC BLOOD PRESSURE: 142 MMHG | DIASTOLIC BLOOD PRESSURE: 64 MMHG | HEART RATE: 68 BPM | WEIGHT: 180 LBS

## 2022-07-25 DIAGNOSIS — G30.1 LATE ONSET ALZHEIMER'S DEMENTIA WITHOUT BEHAVIORAL DISTURBANCE: Primary | ICD-10-CM

## 2022-07-25 DIAGNOSIS — F03.90 DEMENTIA WITHOUT BEHAVIORAL DISTURBANCE, UNSPECIFIED DEMENTIA TYPE: ICD-10-CM

## 2022-07-25 DIAGNOSIS — F02.80 LATE ONSET ALZHEIMER'S DEMENTIA WITHOUT BEHAVIORAL DISTURBANCE: Primary | ICD-10-CM

## 2022-07-25 LAB
ALBUMIN SERPL BCP-MCNC: 3.7 G/DL (ref 3.5–5.2)
ALP SERPL-CCNC: 98 U/L (ref 55–135)
ALT SERPL W/O P-5'-P-CCNC: 16 U/L (ref 10–44)
ANION GAP SERPL CALC-SCNC: 10 MMOL/L (ref 8–16)
AST SERPL-CCNC: 17 U/L (ref 10–40)
BASOPHILS # BLD AUTO: 0.03 K/UL (ref 0–0.2)
BASOPHILS NFR BLD: 0.4 % (ref 0–1.9)
BILIRUB SERPL-MCNC: 0.4 MG/DL (ref 0.1–1)
BUN SERPL-MCNC: 29 MG/DL (ref 8–23)
CALCIUM SERPL-MCNC: 9.2 MG/DL (ref 8.7–10.5)
CHLORIDE SERPL-SCNC: 107 MMOL/L (ref 95–110)
CO2 SERPL-SCNC: 28 MMOL/L (ref 23–29)
CREAT SERPL-MCNC: 1.9 MG/DL (ref 0.5–1.4)
DIFFERENTIAL METHOD: ABNORMAL
EOSINOPHIL # BLD AUTO: 0.2 K/UL (ref 0–0.5)
EOSINOPHIL NFR BLD: 2.8 % (ref 0–8)
ERYTHROCYTE [DISTWIDTH] IN BLOOD BY AUTOMATED COUNT: 13.3 % (ref 11.5–14.5)
EST. GFR  (AFRICAN AMERICAN): 28 ML/MIN/1.73 M^2
EST. GFR  (NON AFRICAN AMERICAN): 24 ML/MIN/1.73 M^2
GLUCOSE SERPL-MCNC: 101 MG/DL (ref 70–110)
HCT VFR BLD AUTO: 37.1 % (ref 37–48.5)
HGB BLD-MCNC: 12 G/DL (ref 12–16)
IMM GRANULOCYTES # BLD AUTO: 0.04 K/UL (ref 0–0.04)
IMM GRANULOCYTES NFR BLD AUTO: 0.6 % (ref 0–0.5)
LYMPHOCYTES # BLD AUTO: 1.7 K/UL (ref 1–4.8)
LYMPHOCYTES NFR BLD: 23.8 % (ref 18–48)
MCH RBC QN AUTO: 31.3 PG (ref 27–31)
MCHC RBC AUTO-ENTMCNC: 32.3 G/DL (ref 32–36)
MCV RBC AUTO: 97 FL (ref 82–98)
MONOCYTES # BLD AUTO: 0.7 K/UL (ref 0.3–1)
MONOCYTES NFR BLD: 9.7 % (ref 4–15)
NEUTROPHILS # BLD AUTO: 4.5 K/UL (ref 1.8–7.7)
NEUTROPHILS NFR BLD: 62.7 % (ref 38–73)
NRBC BLD-RTO: 0 /100 WBC
PLATELET # BLD AUTO: 260 K/UL (ref 150–450)
PMV BLD AUTO: 10.9 FL (ref 9.2–12.9)
POTASSIUM SERPL-SCNC: 4.4 MMOL/L (ref 3.5–5.1)
PROT SERPL-MCNC: 7.7 G/DL (ref 6–8.4)
RBC # BLD AUTO: 3.84 M/UL (ref 4–5.4)
SODIUM SERPL-SCNC: 145 MMOL/L (ref 136–145)
WBC # BLD AUTO: 7.22 K/UL (ref 3.9–12.7)

## 2022-07-25 PROCEDURE — 85025 COMPLETE CBC W/AUTO DIFF WBC: CPT | Performed by: PSYCHIATRY & NEUROLOGY

## 2022-07-25 PROCEDURE — 99214 OFFICE O/P EST MOD 30 MIN: CPT | Mod: PBBFAC,PO | Performed by: PSYCHIATRY & NEUROLOGY

## 2022-07-25 PROCEDURE — 36415 COLL VENOUS BLD VENIPUNCTURE: CPT | Performed by: PSYCHIATRY & NEUROLOGY

## 2022-07-25 PROCEDURE — 99204 OFFICE O/P NEW MOD 45 MIN: CPT | Mod: S$PBB,,, | Performed by: PSYCHIATRY & NEUROLOGY

## 2022-07-25 PROCEDURE — 80053 COMPREHEN METABOLIC PANEL: CPT | Performed by: PSYCHIATRY & NEUROLOGY

## 2022-07-25 PROCEDURE — 99204 PR OFFICE/OUTPT VISIT, NEW, LEVL IV, 45-59 MIN: ICD-10-PCS | Mod: S$PBB,,, | Performed by: PSYCHIATRY & NEUROLOGY

## 2022-07-25 PROCEDURE — 99999 PR PBB SHADOW E&M-EST. PATIENT-LVL IV: CPT | Mod: PBBFAC,,, | Performed by: PSYCHIATRY & NEUROLOGY

## 2022-07-25 PROCEDURE — 99999 PR PBB SHADOW E&M-EST. PATIENT-LVL IV: ICD-10-PCS | Mod: PBBFAC,,, | Performed by: PSYCHIATRY & NEUROLOGY

## 2022-07-25 NOTE — PROGRESS NOTES
[facility] NEUROLOGY  [facility], [place]    Date: 7/25/22  Patient Name: Daniela Harris   MRN: [id number]   PCP: Tayo Varela  Referring Provider: Tayo Varela,*    Chief Complaint:  Late onset Alzheimer's dementia  Subjective:   Patient seen in consultation at the request of Tayo Varela,* for the evaluation of the above chief complaint. A copy of this note will be sent to the referring physician.     This patient has been evaluated by [facility] neurology in the past.  Extensive chart review conducted prior to today's encounter.       HPI:   Ms. Daniela Harris is a 82 y.o. female presenting to [facility] care for late onset Alzheimer's dementia.  The patient currently lives in a nursing home.  She has advanced disease and requires assistance with all activities of daily living.  She has minimal verbal output with simple 1-2 word statements at a time.  Follows directions well.  Accompanied by granddaughter at the time of today's encounter who assist with history collection.    Current memory regimen includes memantine 10 mg twice daily.  No recent laboratory studies available for review but kidney function decreased historically.    PAST MEDICAL HISTORY:  Past Medical History:   Diagnosis Date    After-cataract, obscuring vision - Left Eye 2/28/2014    Arthritis     Diverticulitis     Goiter     MNG    History of appendectomy     Hyperlipidemia     following diet    Hypothyroidism     s/p surgery    Osteopenia     Other and unspecified hyperlipidemia     Peptic ulcer     x 2    Psoriasis     no active illness for years    Stroke     Unspecified essential hypertension     Unspecified vitamin D deficiency     Vitamin B 12 deficiency        PAST SURGICAL HISTORY:  Past Surgical History:   Procedure Laterality Date    APPENDECTOMY      CATARACT EXTRACTION      ou    CATARACT EXTRACTION W/ INTRAOCULAR LENS  IMPLANT, BILATERAL      COLONOSCOPY       ESOPHAGOGASTRODUODENOSCOPY      THYROID SURGERY      Total    TONSILLECTOMY      TONSILLECTOMY, ADENOIDECTOMY      TUBAL LIGATION         CURRENT MEDS:  Current Outpatient Medications   Medication Sig Dispense Refill    acetaminophen (TYLENOL EXTRA STRENGTH) 500 MG tablet Take 2 tablets (1,000 mg total) by mouth 3 (three) times daily as needed for Pain.  0    ascorbic acid, vitamin C, (VITAMIN C) 500 MG tablet Take 1 tablet (500 mg total) by mouth once daily. 1 Tablet Oral Every day 30 tablet 3    aspirin (ECOTRIN) 81 MG EC tablet TAKE ONE TABLET BY MOUTH ONCE DAILY 90 tablet 3    atorvastatin (LIPITOR) 80 MG tablet TAKE ONE TABLET BY MOUTH ONCE DAILY 30 tablet 11    B COMPLEX 1 tablet TAKE ONE TABLET BY MOUTH ONCE DAILY 30 tablet 6    calcium carbonate (OS-GRIFFIN) 600 mg calcium (1,500 mg) Tab Take 1 tablet (600 mg total) by mouth once daily. 90 tablet 3    ferrous gluconate (FERGON) 324 MG tablet TAKE ONE TABLET BY MOUTH TWICE DAILY WITH MEALS 60 tablet 11    multivitamin (THERAGRAN) per tablet Take 1 tablet by mouth once daily. 1 Tablet Oral Every day 30 tablet 3    pantoprazole (PROTONIX) 40 MG tablet TAKE ONE TABLET BY MOUTH ONCE DAILY AS NEEDED 90 tablet 3    triamterene-hydrochlorothiazide 37.5-25 mg (DYAZIDE) 37.5-25 mg per capsule TAKE ONE CAPSULE BY MOUTH EVERY MORNING. 30 capsule 1    cloNIDine (CATAPRES) 0.1 MG tablet TAKE ONE TABLET BY MOUTH TWICE DAILY 60 tablet 11    glucosamine-chondroitn sulf.Na 750-400 mg Cmpk Take 1 tablet by mouth once daily. 1  By mouth Every day 30 each 3    levothyroxine (SYNTHROID) 75 MCG tablet TAKE ONE TABLET BY MOUTH IN THE MORNING ON AN EMPTY STOMACH 90 tablet 3    lisinopriL (PRINIVIL,ZESTRIL) 20 MG tablet TAKE ONE TABLET BY MOUTH EVERY DAY 90 tablet 3    tobramycin-dexamethasone 0.3-0.1% (TOBRADEX) 0.3-0.1 % DrpS Place 1 drop into both eyes 2 (two) times a day. For one week (Patient not taking: Reported on 7/25/2022) 10 mL 0     No current  "facility-administered medications for this visit.       ALLERGIES:  Review of patient's allergies indicates:   Allergen Reactions    Cephalexin Rash     Other reaction(s): Rash       FAMILY HISTORY:  Family History   Problem Relation Age of Onset    Alzheimer's disease Father     Hypertension Father     Cataracts Father     Dementia Father     Diabetes Sister     Hypertension Sister     Hyperlipidemia Sister     Thyroid disease Sister     Parkinsonism Sister     Hypertension Sister     Diabetes Mother     Thyroid disease Maternal Grandmother         MNG    Thyroid disease Cousin     No Known Problems Brother     No Known Problems Maternal Aunt     Dementia Maternal Uncle     No Known Problems Paternal Aunt     No Known Problems Paternal Uncle     No Known Problems Maternal Grandfather     No Known Problems Paternal Grandmother     No Known Problems Paternal Grandfather     Amblyopia Neg Hx     Blindness Neg Hx     Cancer Neg Hx     Glaucoma Neg Hx     Macular degeneration Neg Hx     Retinal detachment Neg Hx     Strabismus Neg Hx     Stroke Neg Hx        SOCIAL HISTORY:  Social History     Tobacco Use    Smoking status: Never Smoker    Smokeless tobacco: Never Used   Substance Use Topics    Alcohol use: No    Drug use: No       Review of Systems:  Unable to be completed due to mental status       Objective:     Vitals:    07/25/22 1354   BP: (!) 142/64   BP Location: Left arm   Pulse: 68   Weight: 81.6 kg (180 lb)   Height: 5' 10" (1.778 m)     General:  Female in NAD, pleasant affect, cooperative   ? ?    HEENT: Head is NC/AT EOMI, pupil size: 4 mm B/L, no nystagmus noted     Cardiovascular: well perfused, no cyanosis        Respiratory: Symmetric chest rise noted       Neurological Examination.    Mental status:  Minimal verbal output, pleasant affect, follows simple commands though they sometimes need to be repeated       Cranial Nerves: II-XII grossly intact.       Muscle " Function:  Symmetric use of bilateral upper and lower extremities against gravity.    Gegenhalten rigidity present in upper extremities     Sensory: ?  Intact to light touch throughout based on reaction      Reflexes:  2/4 throughout bilateral upper extremities, 1/4 throughout bilateral lower extremities     Gait:  Stable with the use of walker.  Adequate stride length.  Small steps and visibly quite cautious without walker    No resting tremor observed.  Enhanced physiological verses essential tremor observed in bilateral upper extremities when muscle groups engaged against gravity.  High velocity low-amplitude tremor somewhat symmetric between the upper extremities    Current mental status not amendable to formal cognitive assessment    Assessment:   Daniela Harris is a 82 y.o. female presenting for evaluation of late onset Alzheimer's dementia.  Laboratory studies conducted right after our encounter today indicated slight worsening in kidney function.  During the encounter, I had an extensive conversation with patient's granddaughter regarding her current memantine regimen.  It is unlikely to be of any benefit given the very advanced stage of disease.  Current dosing too high for kidney function; recommend discontinuing the medication altogether.  Previous failure of donepezil.    Other physical exam elements including gegenhalten rigidity and postural tremor are not consistent with parkinsonism at this time.  Speech deficits are currently consistent with advanced Alzheimer's dementia.    Plan:     Problem List Items Addressed This Visit    None     Visit Diagnoses     Late onset Alzheimer's dementia without behavioral disturbance    -  Primary    Relevant Orders    CBC auto differential (Completed)    Comprehensive metabolic panel (Completed)          - CBC, CMP ordered for further evaluation of whether current memantine dosing is appropriate and for idea of current baseline.  - discontinue memantine   - routine  memory safety discussion held  - endorse continued residence in a facility capable of assistance with all activities of daily living given advanced deficits  - follow-up with our clinic in the future as needed    I spent a total of 45 minutes on the day of the visit. This includes face to face time and non-face to face time preparing to see the patient (eg, review of tests), obtaining and/or reviewing separately obtained history, documenting clinical information in the electronic or other health record, independently interpreting results and communicating results to the patient/family/caregiver, or care coordinator.    A dictation device was used to produce this document. Use of such devices sometimes results in grammatical errors or replacement of words that sound similarly.    Garfield Gonzalez, DO

## 2022-08-02 PROBLEM — N18.30 KIDNEY DISEASE, CHRONIC, STAGE III (GFR 30-59 ML/MIN): Status: RESOLVED | Noted: 2017-04-27 | Resolved: 2022-08-02

## 2022-08-02 PROBLEM — N18.4 CKD (CHRONIC KIDNEY DISEASE) STAGE 4, GFR 15-29 ML/MIN: Status: ACTIVE | Noted: 2022-08-02

## 2022-10-04 ENCOUNTER — OFFICE VISIT (OUTPATIENT)
Dept: DERMATOLOGY | Facility: CLINIC | Age: 82
End: 2022-10-04
Payer: MEDICARE

## 2022-10-04 DIAGNOSIS — L82.1 SEBORRHEIC KERATOSES: Primary | ICD-10-CM

## 2022-10-04 PROCEDURE — 99202 OFFICE O/P NEW SF 15 MIN: CPT | Mod: 95,,, | Performed by: STUDENT IN AN ORGANIZED HEALTH CARE EDUCATION/TRAINING PROGRAM

## 2022-10-04 PROCEDURE — 99202 PR OFFICE/OUTPT VISIT, NEW, LEVL II, 15-29 MIN: ICD-10-PCS | Mod: 95,,, | Performed by: STUDENT IN AN ORGANIZED HEALTH CARE EDUCATION/TRAINING PROGRAM

## 2022-10-04 NOTE — PROGRESS NOTES
Patient Information  Name: Daniela Harris  : 1940  MRN: [id number]     Referring Physician:  Dr. Greene ref. provider found   Primary Care Physician:  Dr. Tayo Varela MD   Date of Visit: 10/04/2022      Subjective:       Daniela Harris is a 82 y.o. female who presents for skin lesion    HPI  The patient location is: [place], [place]  The chief complaint leading to consultation is: skin lesion    Visit type: audiovisual    Face to Face time with patient: 9 min  11 minutes of total time spent on the encounter, which includes face to face time and non-face to face time preparing to see the patient (eg, review of tests), Obtaining and/or reviewing separately obtained history, Documenting clinical information in the electronic or other health record, Independently interpreting results (not separately reported) and communicating results to the patient/family/caregiver, or Care coordination (not separately reported).         Each patient to whom he or she provides medical services by telemedicine is:  (1) informed of the relationship between the physician and patient and the respective role of any other health care provider with respect to management of the patient; and (2) notified that he or she may decline to receive medical services by telemedicine and may withdraw from such care at any time.    Notes:   Patient with new complaint of lesion(s)  Location: right cheek  Duration: months, changed in the past few weeks  Symptoms: changing in color  Relieving factors/Previous treatments: none    Patient was last seen:Visit date not found     Prior notes by myself reviewed.   Clinical documentation obtained by nursing staff reviewed.    Review of Systems   Skin:  Negative for itching and rash.      Objective:    Physical Exam   Constitutional: She appears well-developed and well-nourished. No distress.   Neurological: She is alert and oriented to person, place, and time. She is not disoriented.   Psychiatric: She has a  normal mood and affect.   Skin:   Areas Examined (abnormalities noted in diagram):   Head / Face Inspection Performed            Diagram Legend     Erythematous scaling macule/papule c/w actinic keratosis       Vascular papule c/w angioma      Pigmented verrucoid papule/plaque c/w seborrheic keratosis      Yellow umbilicated papule c/w sebaceous hyperplasia      Irregularly shaped tan macule c/w lentigo     1-2 mm smooth white papules consistent with Milia      Movable subcutaneous cyst with punctum c/w epidermal inclusion cyst      Subcutaneous movable cyst c/w pilar cyst      Firm pink to brown papule c/w dermatofibroma      Pedunculated fleshy papule(s) c/w skin tag(s)      Evenly pigmented macule c/w junctional nevus     Mildly variegated pigmented, slightly irregular-bordered macule c/w mildly atypical nevus      Flesh colored to evenly pigmented papule c/w intradermal nevus       Pink pearly papule/plaque c/w basal cell carcinoma      Erythematous hyperkeratotic cursted plaque c/w SCC      Surgical scar with no sign of skin cancer recurrence      Open and closed comedones      Inflammatory papules and pustules      Verrucoid papule consistent consistent with wart     Erythematous eczematous patches and plaques     Dystrophic onycholytic nail with subungual debris c/w onychomycosis     Umbilicated papule    Erythematous-base heme-crusted tan verrucoid plaque consistent with inflamed seborrheic keratosis     Erythematous Silvery Scaling Plaque c/w Psoriasis     See annotation              [] Data reviewed  [] Independent review of test  [] Management discussed with another provider    Assessment / Plan:        Seborrheic keratoses  These are benign inherited growths without a malignant potential. Reassurance given to patient. No treatment is necessary.              LOS NUMBER AND COMPLEXITY OF PROBLEMS    COMPLEXITY OF DATA RISK TOTAL TIME (m)   04936  [id number] [] 1 self-limited or minor problem [x] Minimal to none  [x] No treatment recommended or patient to monitor 15-29  10-19   53273  33777 Low  [] 2 or > self limited or minor problems  [x] 1 stable chronic illness  [] 1 acute, uncomplicated illness or injury Limited (2)  [] Prior external notes from each unique source  [] Review result of each unique test  [] Order each unique test []  Low  OTC medications, minor skin biopsy 30-44  20-29   14211  39158 Moderate  []  1 or > chronic illness with progression, exacerbation or SE of treatment  []  2 or more stable chronic illnesses  []  1 acute illness with systemic symptoms  []  1 acute complicated injury  []  1 undiagnosed new problem with uncertain prognosis Moderate (1/3 below)  []  3 or more data items        *Now includes assessment requiring independent historian  []  Independent interpretation of a test  []  Discuss management/test with another provider Moderate  []  Prescription drug mgmt  []  Minor surgery with risk discussed  []  Mgmt limited by social determinates 45-59  30-39   35197  05695 High  []  1 or more chronic illness with severe exacerbation, progression or SE of treatment  []  1 acute or chronic illness/injury that poses a threat to life or bodily function Extensive (2/3 below)  []  3 or more data items        *Now includes assessment requiring independent historian.  []  Independent interpretation of a test  []  Discuss management/test with another provider High  []  Major surgery with risk discussed  []  Drug therapy requiring intensive monitoring for toxicity  []  Hospitalization  []  Decision for DNR 60-74  40-54      No follow-ups on file.    Steph Guallpa MD, FAAD  [facility]

## 2023-01-06 ENCOUNTER — LAB VISIT (OUTPATIENT)
Dept: LAB | Facility: HOSPITAL | Age: 83
End: 2023-01-06
Attending: FAMILY MEDICINE
Payer: MEDICARE

## 2023-01-06 DIAGNOSIS — D50.0 IRON DEFICIENCY ANEMIA DUE TO CHRONIC BLOOD LOSS: ICD-10-CM

## 2023-01-06 DIAGNOSIS — E03.9 ACQUIRED HYPOTHYROIDISM: ICD-10-CM

## 2023-01-06 DIAGNOSIS — N18.4 CKD (CHRONIC KIDNEY DISEASE) STAGE 4, GFR 15-29 ML/MIN: ICD-10-CM

## 2023-01-06 DIAGNOSIS — I10 HTN (HYPERTENSION), BENIGN: ICD-10-CM

## 2023-01-06 LAB
ALBUMIN SERPL BCP-MCNC: 3.5 G/DL (ref 3.5–5.2)
ALP SERPL-CCNC: 72 U/L (ref 55–135)
ALT SERPL W/O P-5'-P-CCNC: 16 U/L (ref 10–44)
ANION GAP SERPL CALC-SCNC: 10 MMOL/L (ref 8–16)
AST SERPL-CCNC: 18 U/L (ref 10–40)
BASOPHILS # BLD AUTO: 0.05 K/UL (ref 0–0.2)
BASOPHILS NFR BLD: 0.6 % (ref 0–1.9)
BILIRUB SERPL-MCNC: 0.3 MG/DL (ref 0.1–1)
BUN SERPL-MCNC: 27 MG/DL (ref 8–23)
CALCIUM SERPL-MCNC: 8.7 MG/DL (ref 8.7–10.5)
CHLORIDE SERPL-SCNC: 110 MMOL/L (ref 95–110)
CO2 SERPL-SCNC: 22 MMOL/L (ref 23–29)
CREAT SERPL-MCNC: 1.7 MG/DL (ref 0.5–1.4)
DIFFERENTIAL METHOD: ABNORMAL
EOSINOPHIL # BLD AUTO: 0.3 K/UL (ref 0–0.5)
EOSINOPHIL NFR BLD: 4.1 % (ref 0–8)
ERYTHROCYTE [DISTWIDTH] IN BLOOD BY AUTOMATED COUNT: 14.1 % (ref 11.5–14.5)
EST. GFR  (NO RACE VARIABLE): 29 ML/MIN/1.73 M^2
GLUCOSE SERPL-MCNC: 87 MG/DL (ref 70–110)
HCT VFR BLD AUTO: 34.4 % (ref 37–48.5)
HGB BLD-MCNC: 11 G/DL (ref 12–16)
IMM GRANULOCYTES # BLD AUTO: 0.04 K/UL (ref 0–0.04)
IMM GRANULOCYTES NFR BLD AUTO: 0.5 % (ref 0–0.5)
LYMPHOCYTES # BLD AUTO: 1.7 K/UL (ref 1–4.8)
LYMPHOCYTES NFR BLD: 20.3 % (ref 18–48)
MCH RBC QN AUTO: 31.1 PG (ref 27–31)
MCHC RBC AUTO-ENTMCNC: 32 G/DL (ref 32–36)
MCV RBC AUTO: 97 FL (ref 82–98)
MONOCYTES # BLD AUTO: 0.5 K/UL (ref 0.3–1)
MONOCYTES NFR BLD: 6.3 % (ref 4–15)
NEUTROPHILS # BLD AUTO: 5.7 K/UL (ref 1.8–7.7)
NEUTROPHILS NFR BLD: 68.2 % (ref 38–73)
NRBC BLD-RTO: 0 /100 WBC
PLATELET # BLD AUTO: 262 K/UL (ref 150–450)
PMV BLD AUTO: 10.6 FL (ref 9.2–12.9)
POTASSIUM SERPL-SCNC: 4.6 MMOL/L (ref 3.5–5.1)
PROT SERPL-MCNC: 7.2 G/DL (ref 6–8.4)
RBC # BLD AUTO: 3.54 M/UL (ref 4–5.4)
SODIUM SERPL-SCNC: 142 MMOL/L (ref 136–145)
T4 FREE SERPL-MCNC: 0.92 NG/DL (ref 0.71–1.51)
TSH SERPL DL<=0.005 MIU/L-ACNC: 5.18 UIU/ML (ref 0.4–4)
WBC # BLD AUTO: 8.31 K/UL (ref 3.9–12.7)

## 2023-01-06 PROCEDURE — 36415 COLL VENOUS BLD VENIPUNCTURE: CPT | Performed by: FAMILY MEDICINE

## 2023-01-06 PROCEDURE — 84439 ASSAY OF FREE THYROXINE: CPT | Performed by: FAMILY MEDICINE

## 2023-01-06 PROCEDURE — 85025 COMPLETE CBC W/AUTO DIFF WBC: CPT | Performed by: FAMILY MEDICINE

## 2023-01-06 PROCEDURE — 80053 COMPREHEN METABOLIC PANEL: CPT | Performed by: FAMILY MEDICINE

## 2023-01-06 PROCEDURE — 84443 ASSAY THYROID STIM HORMONE: CPT | Performed by: FAMILY MEDICINE

## 2023-01-07 PROBLEM — N18.4 CKD (CHRONIC KIDNEY DISEASE) STAGE 4, GFR 15-29 ML/MIN: Chronic | Status: ACTIVE | Noted: 2022-08-02

## 2023-01-07 PROBLEM — F02.80 LATE ONSET ALZHEIMER'S DISEASE WITHOUT BEHAVIORAL DISTURBANCE: Chronic | Status: ACTIVE | Noted: 2017-05-23

## 2023-01-07 PROBLEM — G30.1 LATE ONSET ALZHEIMER'S DISEASE WITHOUT BEHAVIORAL DISTURBANCE: Chronic | Status: ACTIVE | Noted: 2017-05-23

## 2023-04-09 ENCOUNTER — HOSPITAL ENCOUNTER (INPATIENT)
Facility: HOSPITAL | Age: 83
LOS: 10 days | Discharge: NURSING, REHAB OR LONG-TERM CARE | DRG: 481 | End: 2023-04-19
Attending: STUDENT IN AN ORGANIZED HEALTH CARE EDUCATION/TRAINING PROGRAM | Admitting: STUDENT IN AN ORGANIZED HEALTH CARE EDUCATION/TRAINING PROGRAM
Payer: MEDICARE

## 2023-04-09 ENCOUNTER — HOSPITAL ENCOUNTER (EMERGENCY)
Facility: HOSPITAL | Age: 83
Discharge: ANOTHER HOSPITAL | End: 2023-04-09
Attending: EMERGENCY MEDICINE
Payer: MEDICARE

## 2023-04-09 VITALS
OXYGEN SATURATION: 97 % | HEART RATE: 84 BPM | DIASTOLIC BLOOD PRESSURE: 91 MMHG | SYSTOLIC BLOOD PRESSURE: 181 MMHG | TEMPERATURE: 99 F | RESPIRATION RATE: 20 BRPM

## 2023-04-09 DIAGNOSIS — G30.1 LATE ONSET ALZHEIMER'S DISEASE WITHOUT BEHAVIORAL DISTURBANCE: Primary | Chronic | ICD-10-CM

## 2023-04-09 DIAGNOSIS — I10 ESSENTIAL HYPERTENSION: ICD-10-CM
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
## 2023-04-09 DIAGNOSIS — S72.009A HIP FRACTURE: ICD-10-CM

## 2023-04-09 DIAGNOSIS — S72.002A CLOSED FRACTURE OF NECK OF LEFT FEMUR, INITIAL ENCOUNTER: Primary | ICD-10-CM

## 2023-04-09 DIAGNOSIS — M25.552 LEFT HIP PAIN: ICD-10-CM

## 2023-04-09 DIAGNOSIS — N18.4 CKD (CHRONIC KIDNEY DISEASE) STAGE 4, GFR 15-29 ML/MIN: Chronic | ICD-10-CM

## 2023-04-09 DIAGNOSIS — F02.80 LATE ONSET ALZHEIMER'S DISEASE WITHOUT BEHAVIORAL DISTURBANCE: Primary | Chronic | ICD-10-CM

## 2023-04-09 DIAGNOSIS — R07.9 CHEST PAIN: ICD-10-CM

## 2023-04-09 DIAGNOSIS — N30.01 ACUTE CYSTITIS WITH HEMATURIA: ICD-10-CM

## 2023-04-09 LAB
ALBUMIN SERPL BCP-MCNC: 3.4 G/DL (ref 3.5–5.2)
ALP SERPL-CCNC: 71 U/L (ref 55–135)
ALT SERPL W/O P-5'-P-CCNC: 23 U/L (ref 10–44)
ANION GAP SERPL CALC-SCNC: 9 MMOL/L (ref 8–16)
AST SERPL-CCNC: 31 U/L (ref 10–40)
BACTERIA #/AREA URNS HPF: ABNORMAL /HPF
BILIRUB SERPL-MCNC: 0.5 MG/DL (ref 0.1–1)
BILIRUB UR QL STRIP: NEGATIVE
BUN SERPL-MCNC: 37 MG/DL (ref 8–23)
CALCIUM SERPL-MCNC: 8.6 MG/DL (ref 8.7–10.5)
CHLORIDE SERPL-SCNC: 113 MMOL/L (ref 95–110)
CLARITY UR: ABNORMAL
CO2 SERPL-SCNC: 19 MMOL/L (ref 23–29)
COLOR UR: YELLOW
CREAT SERPL-MCNC: 1.6 MG/DL (ref 0.5–1.4)
ERYTHROCYTE [DISTWIDTH] IN BLOOD BY AUTOMATED COUNT: 13.3 % (ref 11.5–14.5)
EST. GFR  (NO RACE VARIABLE): 31 ML/MIN/1.73 M^2
GLUCOSE SERPL-MCNC: 138 MG/DL (ref 70–110)
GLUCOSE UR QL STRIP: NEGATIVE
HCT VFR BLD AUTO: 32.2 % (ref 37–48.5)
HGB BLD-MCNC: 10.7 G/DL (ref 12–16)
HGB UR QL STRIP: ABNORMAL
HYALINE CASTS #/AREA URNS LPF: 0 /LPF
KETONES UR QL STRIP: NEGATIVE
LEUKOCYTE ESTERASE UR QL STRIP: ABNORMAL
MCH RBC QN AUTO: 31.7 PG (ref 27–31)
MCHC RBC AUTO-ENTMCNC: 33.2 G/DL (ref 32–36)
MCV RBC AUTO: 95 FL (ref 82–98)
MICROSCOPIC COMMENT: ABNORMAL
NITRITE UR QL STRIP: NEGATIVE
PH UR STRIP: 6 [PH] (ref 5–8)
PLATELET # BLD AUTO: 218 K/UL (ref 150–450)
PMV BLD AUTO: 10.5 FL (ref 9.2–12.9)
POTASSIUM SERPL-SCNC: 4.8 MMOL/L (ref 3.5–5.1)
PROT SERPL-MCNC: 6.9 G/DL (ref 6–8.4)
PROT UR QL STRIP: ABNORMAL
RBC # BLD AUTO: 3.38 M/UL (ref 4–5.4)
RBC #/AREA URNS HPF: >100 /HPF (ref 0–4)
SODIUM SERPL-SCNC: 141 MMOL/L (ref 136–145)
SP GR UR STRIP: 1.02 (ref 1–1.03)
SQUAMOUS #/AREA URNS HPF: 11 /HPF
URN SPEC COLLECT METH UR: ABNORMAL
UROBILINOGEN UR STRIP-ACNC: NEGATIVE EU/DL
WBC # BLD AUTO: 13.49 K/UL (ref 3.9–12.7)
WBC #/AREA URNS HPF: >100 /HPF (ref 0–5)
WBC CLUMPS URNS QL MICRO: ABNORMAL

## 2023-04-09 PROCEDURE — 93010 EKG 12-LEAD: ICD-10-PCS | Mod: ,,, | Performed by: INTERNAL MEDICINE

## 2023-04-09 PROCEDURE — 80053 COMPREHEN METABOLIC PANEL: CPT | Performed by: EMERGENCY MEDICINE

## 2023-04-09 PROCEDURE — 63600175 PHARM REV CODE 636 W HCPCS: Performed by: STUDENT IN AN ORGANIZED HEALTH CARE EDUCATION/TRAINING PROGRAM

## 2023-04-09 PROCEDURE — 87086 URINE CULTURE/COLONY COUNT: CPT | Performed by: EMERGENCY MEDICINE

## 2023-04-09 PROCEDURE — A4216 STERILE WATER/SALINE, 10 ML: HCPCS | Performed by: STUDENT IN AN ORGANIZED HEALTH CARE EDUCATION/TRAINING PROGRAM

## 2023-04-09 PROCEDURE — 25000003 PHARM REV CODE 250: Performed by: STUDENT IN AN ORGANIZED HEALTH CARE EDUCATION/TRAINING PROGRAM

## 2023-04-09 PROCEDURE — 93010 ELECTROCARDIOGRAM REPORT: CPT | Mod: ,,, | Performed by: INTERNAL MEDICINE

## 2023-04-09 PROCEDURE — 93005 ELECTROCARDIOGRAM TRACING: CPT

## 2023-04-09 PROCEDURE — 99285 EMERGENCY DEPT VISIT HI MDM: CPT | Mod: 25

## 2023-04-09 PROCEDURE — 81000 URINALYSIS NONAUTO W/SCOPE: CPT | Performed by: EMERGENCY MEDICINE

## 2023-04-09 PROCEDURE — 11000001 HC ACUTE MED/SURG PRIVATE ROOM

## 2023-04-09 PROCEDURE — 85027 COMPLETE CBC AUTOMATED: CPT | Performed by: EMERGENCY MEDICINE

## 2023-04-09 RX ORDER — PROCHLORPERAZINE EDISYLATE 5 MG/ML
5 INJECTION INTRAMUSCULAR; INTRAVENOUS EVERY 6 HOURS PRN
Status: DISCONTINUED | OUTPATIENT
Start: 2023-04-09 | End: 2023-04-19 | Stop reason: HOSPADM

## 2023-04-09 RX ORDER — MORPHINE SULFATE 4 MG/ML
3 INJECTION, SOLUTION INTRAMUSCULAR; INTRAVENOUS EVERY 6 HOURS PRN
Status: DISCONTINUED | OUTPATIENT
Start: 2023-04-09 | End: 2023-04-14

## 2023-04-09 RX ORDER — SODIUM,POTASSIUM PHOSPHATES 280-250MG
2 POWDER IN PACKET (EA) ORAL
Status: DISCONTINUED | OUTPATIENT
Start: 2023-04-09 | End: 2023-04-18

## 2023-04-09 RX ORDER — NALOXONE HCL 0.4 MG/ML
0.02 VIAL (ML) INJECTION
Status: DISCONTINUED | OUTPATIENT
Start: 2023-04-09 | End: 2023-04-19 | Stop reason: HOSPADM

## 2023-04-09 RX ORDER — GLUCAGON 1 MG
1 KIT INJECTION
Status: DISCONTINUED | OUTPATIENT
Start: 2023-04-09 | End: 2023-04-19 | Stop reason: HOSPADM

## 2023-04-09 RX ORDER — LEVOFLOXACIN 5 MG/ML
750 INJECTION, SOLUTION INTRAVENOUS
Status: DISCONTINUED | OUTPATIENT
Start: 2023-04-09 | End: 2023-04-09 | Stop reason: HOSPADM

## 2023-04-09 RX ORDER — HEPARIN SODIUM 5000 [USP'U]/ML
5000 INJECTION, SOLUTION INTRAVENOUS; SUBCUTANEOUS EVERY 8 HOURS
Status: DISCONTINUED | OUTPATIENT
Start: 2023-04-09 | End: 2023-04-19 | Stop reason: HOSPADM

## 2023-04-09 RX ORDER — LANOLIN ALCOHOL/MO/W.PET/CERES
800 CREAM (GRAM) TOPICAL
Status: DISCONTINUED | OUTPATIENT
Start: 2023-04-09 | End: 2023-04-18

## 2023-04-09 RX ORDER — CLONIDINE HYDROCHLORIDE 0.1 MG/1
0.1 TABLET ORAL 2 TIMES DAILY
Status: DISCONTINUED | OUTPATIENT
Start: 2023-04-09 | End: 2023-04-18

## 2023-04-09 RX ORDER — ACETAMINOPHEN 325 MG/1
650 TABLET ORAL EVERY 8 HOURS PRN
Status: DISCONTINUED | OUTPATIENT
Start: 2023-04-09 | End: 2023-04-14

## 2023-04-09 RX ORDER — HYDROCODONE BITARTRATE AND ACETAMINOPHEN 5; 325 MG/1; MG/1
1 TABLET ORAL EVERY 6 HOURS PRN
Status: DISCONTINUED | OUTPATIENT
Start: 2023-04-09 | End: 2023-04-14

## 2023-04-09 RX ORDER — SIMETHICONE 80 MG
1 TABLET,CHEWABLE ORAL 4 TIMES DAILY PRN
Status: DISCONTINUED | OUTPATIENT
Start: 2023-04-09 | End: 2023-04-19 | Stop reason: HOSPADM

## 2023-04-09 RX ORDER — SODIUM,POTASSIUM PHOSPHATES 280-250MG
2 POWDER IN PACKET (EA) ORAL
Status: DISCONTINUED | OUTPATIENT
Start: 2023-04-09 | End: 2023-04-19 | Stop reason: HOSPADM

## 2023-04-09 RX ORDER — POLYETHYLENE GLYCOL 3350 17 G/17G
17 POWDER, FOR SOLUTION ORAL DAILY
Status: DISCONTINUED | OUTPATIENT
Start: 2023-04-10 | End: 2023-04-19 | Stop reason: HOSPADM

## 2023-04-09 RX ORDER — ACETAMINOPHEN 325 MG/1
650 TABLET ORAL EVERY 4 HOURS PRN
Status: DISCONTINUED | OUTPATIENT
Start: 2023-04-09 | End: 2023-04-14

## 2023-04-09 RX ORDER — BISACODYL 10 MG
10 SUPPOSITORY, RECTAL RECTAL DAILY PRN
Status: DISCONTINUED | OUTPATIENT
Start: 2023-04-09 | End: 2023-04-19 | Stop reason: HOSPADM

## 2023-04-09 RX ORDER — IBUPROFEN 200 MG
24 TABLET ORAL
Status: DISCONTINUED | OUTPATIENT
Start: 2023-04-09 | End: 2023-04-19 | Stop reason: HOSPADM

## 2023-04-09 RX ORDER — PANTOPRAZOLE SODIUM 40 MG/1
40 TABLET, DELAYED RELEASE ORAL DAILY
Status: DISCONTINUED | OUTPATIENT
Start: 2023-04-10 | End: 2023-04-19 | Stop reason: HOSPADM

## 2023-04-09 RX ORDER — LEVOTHYROXINE SODIUM 75 UG/1
75 TABLET ORAL
Status: DISCONTINUED | OUTPATIENT
Start: 2023-04-10 | End: 2023-04-19 | Stop reason: HOSPADM

## 2023-04-09 RX ORDER — SODIUM CHLORIDE 0.9 % (FLUSH) 0.9 %
10 SYRINGE (ML) INJECTION EVERY 12 HOURS PRN
Status: DISCONTINUED | OUTPATIENT
Start: 2023-04-09 | End: 2023-04-19 | Stop reason: HOSPADM

## 2023-04-09 RX ORDER — ONDANSETRON 2 MG/ML
4 INJECTION INTRAMUSCULAR; INTRAVENOUS EVERY 8 HOURS PRN
Status: DISCONTINUED | OUTPATIENT
Start: 2023-04-09 | End: 2023-04-19 | Stop reason: HOSPADM

## 2023-04-09 RX ORDER — IPRATROPIUM BROMIDE AND ALBUTEROL SULFATE 2.5; .5 MG/3ML; MG/3ML
3 SOLUTION RESPIRATORY (INHALATION) EVERY 4 HOURS PRN
Status: DISCONTINUED | OUTPATIENT
Start: 2023-04-09 | End: 2023-04-10

## 2023-04-09 RX ORDER — IBUPROFEN 200 MG
16 TABLET ORAL
Status: DISCONTINUED | OUTPATIENT
Start: 2023-04-09 | End: 2023-04-19 | Stop reason: HOSPADM

## 2023-04-09 RX ORDER — LISINOPRIL 20 MG/1
20 TABLET ORAL DAILY
Status: DISCONTINUED | OUTPATIENT
Start: 2023-04-10 | End: 2023-04-19 | Stop reason: HOSPADM

## 2023-04-09 RX ORDER — TALC
6 POWDER (GRAM) TOPICAL NIGHTLY PRN
Status: DISCONTINUED | OUTPATIENT
Start: 2023-04-09 | End: 2023-04-19 | Stop reason: HOSPADM

## 2023-04-09 RX ORDER — FERROUS GLUCONATE 324(37.5)
324 TABLET ORAL 2 TIMES DAILY WITH MEALS
Status: DISCONTINUED | OUTPATIENT
Start: 2023-04-09 | End: 2023-04-19 | Stop reason: HOSPADM

## 2023-04-09 RX ORDER — MAG HYDROX/ALUMINUM HYD/SIMETH 200-200-20
30 SUSPENSION, ORAL (FINAL DOSE FORM) ORAL 4 TIMES DAILY PRN
Status: DISCONTINUED | OUTPATIENT
Start: 2023-04-09 | End: 2023-04-19 | Stop reason: HOSPADM

## 2023-04-09 RX ADMIN — CLONIDINE HYDROCHLORIDE 0.1 MG: 0.1 TABLET ORAL at 10:04

## 2023-04-09 RX ADMIN — HEPARIN SODIUM 5000 UNITS: 5000 INJECTION INTRAVENOUS; SUBCUTANEOUS at 10:04

## 2023-04-09 RX ADMIN — Medication 10 ML: at 10:04

## 2023-04-09 RX ADMIN — ACETAMINOPHEN 650 MG: 325 TABLET ORAL at 10:04

## 2023-04-09 NOTE — ED PROVIDER NOTES
Emergency Department Encounter  Provider Note    Daniela Harris  [id number]  4/9/2023    Evaluation:    History:     Chief Complaint   Patient presents with    Fall     Unwitnessed fall this morning, sent from [facility]. Unable to bear weight. Unknown of head injury      Daniela Harris is a 83 y.o. female who has a past medical history of After-cataract, obscuring vision - Left Eye (2/28/2014), Arthritis, Diverticulitis, Goiter, History of appendectomy, Hyperlipidemia, Hypothyroidism, Osteopenia, Other and unspecified hyperlipidemia, Peptic ulcer, Psoriasis, Stroke, Unspecified essential hypertension, Unspecified vitamin D deficiency, and Vitamin B 12 deficiency.    The patient presents to the ED due to fall.   She has history of dementia. Nursing home staff found patient on the ground. She could not bear weight on her L leg.   EMS arrived to find her sitting in a recliner.   She seemed to grimace with palpation of L leg.    On arrival, she is awake but unable to provide history.     Majority of history obtained via chart review.     Past Medical History:   Diagnosis Date    After-cataract, obscuring vision - Left Eye 2/28/2014    Arthritis     Diverticulitis     Goiter     MNG    History of appendectomy     Hyperlipidemia     following diet    Hypothyroidism     s/p surgery    Osteopenia     Other and unspecified hyperlipidemia     Peptic ulcer     x 2    Psoriasis     no active illness for years    Stroke     Unspecified essential hypertension     Unspecified vitamin D deficiency     Vitamin B 12 deficiency      Past Surgical History:   Procedure Laterality Date    APPENDECTOMY      CATARACT EXTRACTION      ou    CATARACT EXTRACTION W/ INTRAOCULAR LENS  IMPLANT, BILATERAL      COLONOSCOPY      ESOPHAGOGASTRODUODENOSCOPY      THYROID SURGERY      Total    TONSILLECTOMY      TONSILLECTOMY, ADENOIDECTOMY      TUBAL LIGATION       Family History   Problem Relation Age of Onset    Alzheimer's disease Father      Hypertension Father     Cataracts Father     Dementia Father     Diabetes Sister     Hypertension Sister     Hyperlipidemia Sister     Thyroid disease Sister     Parkinsonism Sister     Hypertension Sister     Diabetes Mother     Thyroid disease Maternal Grandmother         MNG    Thyroid disease Cousin     No Known Problems Brother     No Known Problems Maternal Aunt     Dementia Maternal Uncle     No Known Problems Paternal Aunt     No Known Problems Paternal Uncle     No Known Problems Maternal Grandfather     No Known Problems Paternal Grandmother     No Known Problems Paternal Grandfather     Amblyopia Neg Hx     Blindness Neg Hx     Cancer Neg Hx     Glaucoma Neg Hx     Macular degeneration Neg Hx     Retinal detachment Neg Hx     Strabismus Neg Hx     Stroke Neg Hx      Social History     Socioeconomic History    Marital status:    Tobacco Use    Smoking status: Never    Smokeless tobacco: Never   Substance and Sexual Activity    Alcohol use: No    Drug use: No     Review of patient's allergies indicates:   Allergen Reactions    Cephalexin Rash     Other reaction(s): Rash       Review of Systems   Musculoskeletal:  Positive for arthralgias and myalgias.     Physical Exam:     Initial Vitals [04/09/23 1200]   BP Pulse Resp Temp SpO2   (!) 175/87 70 16 98.7 °F (37.1 °C) 97 %      MAP       --         Physical Exam    Nursing note and vitals reviewed.  Constitutional: She appears well-developed and well-nourished. She is not diaphoretic. No distress.   Awake, calm, in no distress.    HENT:   Head: Normocephalic and atraumatic.   Mouth/Throat: Oropharynx is clear and moist.   Eyes: EOM are normal. Pupils are equal, round, and reactive to light.   Neck: No tracheal deviation present.   Cardiovascular:  Normal rate, regular rhythm, normal heart sounds and intact distal pulses.           Pulmonary/Chest: Breath sounds normal. No stridor. No respiratory distress. She has no wheezes.   Abdominal: Abdomen is  soft. Bowel sounds are normal. She exhibits no distension and no mass. There is no abdominal tenderness.   Musculoskeletal:         General: No edema.      Left hip: Decreased range of motion.        Legs:       Comments: TTP L hip and pelvis.  Discomfort with passive ROM L knee and hip.      Neurological: She is alert and oriented to person, place, and time. She has normal strength. No cranial nerve deficit or sensory deficit.   Skin: Skin is warm and dry. Capillary refill takes less than 2 seconds. No pallor.   Psychiatric: She has a normal mood and affect. Her behavior is normal. Thought content normal.       ED Course:   Procedures    Medical Decision Making:    History Acquisition:   Additional historians utilized:  EMS report, son on phone, see HPI    Prior medical records were reviewed:   IM visit 01/2023 for f/u HTN, CKD, Alzheimer's dementia  Derm visit 10/2022 for seborrheic dermatitis   IM visit 08/2022 for f/u L5 fracture    The patient's list of active medical problems, social history, medications, and allergies as documented has been reviewed.     Differential Diagnoses:   Based on available information and initial assessment, Differential Diagnosis includes, but is not limited to:  Polytrauma, fall/syncope, traumatic SAH/intracranial bleed, skull/c-spine/facial fracture, concussion, neck injury, chest trauma, intraabdominal bleed, solid organ injury, pelvic fracture, long bone fracture/dislocation, nerve injury/palsy, vascular injury, hemarthrosis, septic joint, osteoarthritis, compartment syndrome, rhabdomyolysis, soft tissue contusion, muscle strain, ligament tear/sprain, foreign body, laceration, abrasion.        EKG:   EKG interpretation by ED attending physician:  Artifact present inhibiting interpretation:  NSR, rate 96, no obvious ST changes or acute ischemia, normal intervals.  Compared with prior EKG dated 2017, grossly stable without significant change.      Labs:     Labs Reviewed   CBC  WITHOUT DIFFERENTIAL - Abnormal; Notable for the following components:       Result Value    WBC 13.49 (*)     RBC 3.38 (*)     Hemoglobin 10.7 (*)     Hematocrit 32.2 (*)     MCH 31.7 (*)     All other components within normal limits   COMPREHENSIVE METABOLIC PANEL - Abnormal; Notable for the following components:    Chloride 113 (*)     CO2 19 (*)     Glucose 138 (*)     BUN 37 (*)     Creatinine 1.6 (*)     Calcium 8.6 (*)     Albumin 3.4 (*)     eGFR 31 (*)     All other components within normal limits   URINALYSIS, REFLEX TO URINE CULTURE - Abnormal; Notable for the following components:    Appearance, UA Cloudy (*)     Protein, UA 2+ (*)     Occult Blood UA 2+ (*)     Leukocytes, UA 3+ (*)     All other components within normal limits    Narrative:     Specimen Source->Urine   URINALYSIS MICROSCOPIC - Abnormal; Notable for the following components:    RBC, UA >100 (*)     WBC, UA >100 (*)     WBC Clumps, UA Many (*)     Bacteria Many (*)     All other components within normal limits    Narrative:     Specimen Source->Urine   CULTURE, URINE     Independent review of the labs ordered include:   See ED course    Imaging:     Imaging Results              CT Hip Without Contrast Left (Final result)  Result time 04/09/23 14:07:20      Final result by Isabel Cr MD (04/09/23 14:07:20)                   Impression:      Transcervical femoral neck fracture      Electronically signed by: Isabel Cr MD  Date:    04/09/2023  Time:    14:07               Narrative:    EXAMINATION:  CT HIP WITHOUT CONTRAST LEFT    CLINICAL HISTORY:  Fracture, hip;    TECHNIQUE:  0.625-mm axial images of the lower extremity were obtained per ERKHA protocol.  REKHA motion alicia is in place.    COMPARISON:  Radiograph 04/09/2023    FINDINGS:  There is a transcervical femoral neck fracture, minimal osseous impaction.    The femoral head remains within the acetabulum.    No large joint effusion.    The subcutaneous soft tissue  and musculature appear within normal limits.    The lesser and greater trochanter appear to be intact.    The visualized left iliac bone is intact.    The intrapelvic content demonstrate no abnormalities.                                       X-Ray Hips Bilateral 2 View Incl AP Pelvis (Edited Result - FINAL)  Result time 04/09/23 13:41:14      Addendum (preliminary) 1 of 1 by Kayy Valenzuela MD (04/09/23 13:41:14)      There is a faint lucency overlying the left femoral neck.  Although Shenton's line is maintained, further evaluation with CT is suggested to exclude nondisplaced fracture as this is the area of clinical concern. Findings were discussed with Dr. Foss by Pradip on 4-9-23 at 13:40.      Electronically signed by: Kayy Valenzuela MD  Date:    04/09/2023  Time:    13:41                   Final result by Kayy Valenzuela MD (04/09/23 13:28:21)                   Impression:      As above.      Electronically signed by: Kayy Valenzuela MD  Date:    04/09/2023  Time:    13:28               Narrative:    EXAMINATION:  XR HIPS BILATERAL 2 VIEW INCL AP PELVIS    CLINICAL HISTORY:  Pain in left hip    TECHNIQUE:  AP view of the pelvis and frogleg lateral views of both hips were performed.    COMPARISON:  November 9, 2007    FINDINGS:  There is diffuse osseous demineralization.  No acute fracture or bony destructive process is seen.  Alignment is preserved.  There are degenerative changes at the pubic symphysis.  There is mild spur formation on the acetabula bilaterally.  Rounded calcifications overlying the pelvis are most likely phleboliths.  There are vascular calcifications in the pelvis and soft tissues of the proximal thighs.                                       CT Cervical Spine Without Contrast (Final result)  Result time 04/09/23 13:37:41      Final result by Isabel Cr MD (04/09/23 13:37:41)                   Impression:      No fracture identified.    Spondylosis of the cervical  spine as detailed above.    Significant atherosclerotic plaque of the bilateral carotid bulb region.    Pulmonary nodules, According to Fleischner Society guidelines, in individuals with single solid noncalcified nodules greater than 8 mm, consider CT in 3 months, PET/CT, or tissue sampling.      Electronically signed by: Isabel Cr MD  Date:    04/09/2023  Time:    13:37               Narrative:    EXAMINATION:  CT CERVICAL SPINE WITHOUT CONTRAST    CLINICAL HISTORY:  Neck trauma (Age >= 65y);    TECHNIQUE:  Low dose axial images, sagittal and coronal reformations were performed though the cervical spine.  Contrast was not administered.    COMPARISON:  None    FINDINGS:  Straightening of the cervical lordosis.  Subtle anterolisthesis of C2 relative to C3.  Subtle anterolisthesis of C7 relative to T1.  Mild disc space narrowing at C3-4 and C5-6 and severe disc space narrowing at C6-C7.    The atlantooccipital region appear normal bilaterally.    The bilateral C1-C2 lateral masses appear normal.    Mild mucosal thickening of the right maxillary sinus.  The bilateral temporomandibular joint appear normal.  The visualized mastoid air cells are well aerated.    0.4 cm right apex nodule (3: 229).    0.8 cm spiculated nodule left upper lobe (3:233).    Significant atherosclerotic plaque of the carotid vessels bilaterally.    C2-C3: Left facet joint osseous hypertrophy, no canal stenosis.  Mild left foraminal narrowing.    C3-C4: Posterior disc osteophyte complex, left facet joint osseous hypertrophy, no canal stenosis.  There is moderate left foraminal narrowing.    C4-C5: Diffuse disc bulge, no canal stenosis, no significant foraminal narrowing.    C5-C6: Diffuse disc bulge, right facet joint osseous hypertrophy, right uncovertebral spur, no canal stenosis.  There is mild right foraminal narrowing.    C6-C7: Posterior disc osteophyte complex, bilateral uncovertebral spur, no canal stenosis or foraminal  narrowing.    C7-T1: Unremarkable                                       CT Head Without Contrast (Final result)  Result time 04/09/23 13:13:08      Final result by Dante Phelps MD (04/09/23 13:13:08)                   Impression:      No acute intracranial findings.      Electronically signed by: Dante Phelps  Date:    04/09/2023  Time:    13:13               Narrative:    EXAMINATION:  CT HEAD WITHOUT CONTRAST    CLINICAL HISTORY:  Head trauma, minor (Age >= 65y);    TECHNIQUE:  Low dose axial images were obtained through the head.  Coronal and sagittal reformations were also performed. Contrast was not administered.    COMPARISON:  MRI brain 12/02/2014.    FINDINGS:  Blood: No acute intracranial hemorrhage.    Parenchyma: No definite loss of gray-white differentiation to suggest acute or subacute transcortical infarct. Parenchymal volume loss.  Notable atrophy of the left greater than right mesial temporal lobe structures.  Remote lacunar in full X involve the deep gray nuclei.  Areas of nonspecific white matter hypoattenuation may relate to sequela of chronic small vessel ischemic disease.    Ventricles/Extra-axial spaces: No abnormal extra-axial fluid collection. Basal cisterns are patent.    Vessels: Moderate to heavy atherosclerotic calcification.    Orbits: Status post bilateral lens replacements.    Scalp: Unremarkable.    Skull: There are no depressed skull fractures or destructive bone lesions.    Sinuses and mastoids: Essentially clear.    Other findings: None                                         Additional Consideration:   Additional testing considered during clinical course: none    Social determinants of health considered during development of treatment plan include: poor access to care    Current co-morbidities considered which impacted clinical decision making: dementia, HTN, CKD, HLD    Case discussed with additional provider: [facility], Orthopedic Surgery, Ortho/HM services at [facility] via  [facility]    Medications   levoFLOXacin 750 mg/150 mL IVPB 750 mg (has no administration in time range)        ED Course as of 04/09/23 1614   Sun Apr 09, 2023   1314 CBC Without Differential(!)  Mild leukocytosis [SS]   1314 Comprehensive metabolic panel(!)  CR 1.6, stable, BUN mildly elevated, otherwise unremarkable  [SS]   1316 CT Head Without Contrast  CT head independently interpreted: no intracranial hemorrhage, mass effect, or midline shift.  Agree with radiologist interpretation.    [SS]   1336 X-Ray Hips Bilateral 2 View Incl AP Pelvis  X-ray independently interpreted: possible non-displaced L femoral neck fracture.  Contacted radiologist for closer look and re-interpretation.    [SS]   1410 Radiology recommends CT to evaluate possible non-displaced fracture.  [SS]   1411 CT Hip Without Contrast Left  CT hip independently interpreted: L femoral neck fracture.  Agree with radiologist interpretation.    [SS]   1411 Called [facility], will look into transfer to [facility] for Ortho evaluation.  [SS]   1445 Discussed patient with [facility] and connected with Dr. Combs, orthopedic surgery, who refused transfer, stating it was not an emergency.   [place] continues to be on orthopedics diversion until Wednesday, April 12th. I do not feel it is appropriate to admit the patient in [place] without Orthopedics availability for the next 3 days. Discussed with [facility] to contact [facility] for potential transfer for orthopedics evaluation and operative intervention.  [SS]   1608 Patient accepted for admission to [facility].  [SS]   1609 Urinalysis Microscopic(!)  Incidental UTI. IV Levaquin ordered due to cephalosporin allergy. [SS]   1610 Urinalysis, Reflex to Urine Culture Urine, Clean Catch(!)  Consistent with UTI [SS]      ED Course User Index  [SS] Niranjan Foss MD       Medical Decision Making:   Initial Assessment:   82 yo F with L hip pain after fall.  Will obtain x-ray pelvis/hips, as well as CT head/c-spine given fall and  history of dementia.  High suspicion for fracture, will obtain labs, EKG as patient will likely require surgery.  Independently Interpreted Test(s):   I have ordered and independently interpreted X-rays - see prior notes.  I have ordered and independently interpreted EKG Reading(s) - see prior notes  Clinical Tests:   Lab Tests: Ordered and Reviewed  Radiological Study: Reviewed and Ordered  Medical Tests: Ordered and Reviewed    On re-evaluation, the patient's status has remained stable.  At this time, I believe the patient should be transferred to [facility] for further evaluation and management of L hip fracture.  Ortho and HM services were contacted via [facility], and the case was discussed. The consulting physician/team agrees with plan and will evaluate upon patient's arrival.    The patient and family were updated with test results, overall impression, and further plan of care. All questions were answered. The patient expressed understanding and agrees with the current plan.               Clinical Impression:       ICD-10-CM ICD-9-CM   1. Closed fracture of neck of left femur, initial encounter  S72.002A 820.8   2. Left hip pain  M25.552 719.45   3. Fall  W19.XXXA E888.9   4. Acute cystitis with hematuria  N30.01 595.0          ED Disposition Condition    Transfer to Another Facility Stable               Niranjan Foss MD  04/09/23 6668

## 2023-04-09 NOTE — PROVIDER TRANSFER
Outside Transfer Acceptance Note / [facility]    Referring facility: [facility]   Referring provider: AMINATA GOMEZ SCOTT R.  Accepting facility: [facility]  Accepting provider: Guille Downey MD  Admitting provider:   Reason for transfer:  Needing ortho services  Transfer diagnosis: Left transcervical femoral neck fracture  Transfer specialty requested: Orthopedic Surgery  Transfer specialty notified: NA  Transfer level: NUMBER 1-5: 2  Bed type requested: med-surg  Isolation status: No active isolations   Admission class or status: IP- Inpatient      Narrative     Daniela Harris is an 84yo F with a PMH of HTN, CKD 3b/4, hypothyroidism, GERD, NATY, cataract (L), arthritis, vitamin B12 deficiency, osteopenia, and dementia who presented to the [facility] ED on 4/9/23 [facility]. Per EMS report, pt had an unwitnessed fall with facility staff finding her on the ground, and she was unable to bear weight on her left leg; further history limited 2/2 dementia, though EMS found her in a sitting position, and pt grimaced when her left leg was palpated.    In the ED, vitals significant for /87. Labs remarkable for WBC 13.49, Hb 10.7, and Cr 1.6 (appears at baseline). CT hip showed a left-sided transcervical femoral neck fracture. [facility] contacted for transfer requiring orthopedic services, and pt was accepted to [facility] under hospital medicine for further care and management.    Past Medical History:   Diagnosis Date    After-cataract, obscuring vision - Left Eye 2/28/2014    Arthritis     Diverticulitis     Goiter     MNG    History of appendectomy     Hyperlipidemia     following diet    Hypothyroidism     s/p surgery    Osteopenia     Other and unspecified hyperlipidemia     Peptic ulcer     x 2    Psoriasis     no active illness for years    Stroke     Unspecified essential hypertension     Unspecified vitamin D deficiency      Vitamin B 12 deficiency          Objective     Vitals: Temp: 98.7 °F (37.1 °C) (04/09/23 1200)  Pulse: 70 (04/09/23 1200)  Resp: 16 (04/09/23 1200)  BP: (!) 175/87 (04/09/23 1200)  SpO2: 97 % (04/09/23 1200)  Recent Labs: All pertinent labs within the past 24 hours have been reviewed.  CBC:   Recent Labs   Lab 04/09/23  1243   WBC 13.49*   HGB 10.7*   HCT 32.2*        CMP:   Recent Labs   Lab 04/09/23  1243      K 4.8   *   CO2 19*   *   BUN 37*   CREATININE 1.6*   CALCIUM 8.6*   PROT 6.9   ALBUMIN 3.4*   BILITOT 0.5   ALKPHOS 71   AST 31   ALT 23   ANIONGAP 9        IV access:        Peripheral IV - Single Lumen 04/09/23 1245 20 G Right Forearm (Active)   Site Assessment Clean;Dry;Intact;No redness;No swelling;No drainage;No warmth 04/09/23 1245   Extremity Assessment Distal to IV No redness;No swelling;No warmth;No abnormal discoloration;Warm;Dry 04/09/23 1245   Line Status Blood return noted;Saline locked;Flushed 04/09/23 1245   Dressing Status Clean;Dry;Intact 04/09/23 1245   Dressing Intervention First dressing 04/09/23 1245     Allergies:   Review of patient's allergies indicates:   Allergen Reactions    Cephalexin Rash     Other reaction(s): Rash      NPO: No    Anticoagulation:   Anticoagulants       None             Instructions      [facility]  Admit to Hospital Medicine  Upon patient arrival to floor, please contact Hospital Medicine on call.

## 2023-04-09 NOTE — NURSING
[facility]  Nurses Note -- 4 Eyes      4/9/2023       Skin assessed on: Admit      [x] No Pressure Injuries Present    []Prevention Measures Documented    [] Yes LDA  for Pressure Injury Previously documented     [] Yes New Pressure Injury Discovered   [] LDA for New Pressure Injury Added      Attending RN:  Martha Billy, RN     Second RN: Mamie Ross

## 2023-04-10 ENCOUNTER — ANESTHESIA EVENT (OUTPATIENT)
Dept: SURGERY | Facility: HOSPITAL | Age: 83
DRG: 481 | End: 2023-04-10
Payer: MEDICARE

## 2023-04-10 ENCOUNTER — ANESTHESIA (OUTPATIENT)
Dept: SURGERY | Facility: HOSPITAL | Age: 83
DRG: 481 | End: 2023-04-10
Payer: MEDICARE

## 2023-04-10 LAB
ABO + RH BLD: NORMAL
ALBUMIN SERPL BCP-MCNC: 3.1 G/DL (ref 3.5–5.2)
ALP SERPL-CCNC: 71 U/L (ref 55–135)
ALT SERPL W/O P-5'-P-CCNC: 21 U/L (ref 10–44)
ANION GAP SERPL CALC-SCNC: 7 MMOL/L (ref 8–16)
AST SERPL-CCNC: 31 U/L (ref 10–40)
BACTERIA UR CULT: NORMAL
BACTERIA UR CULT: NORMAL
BASOPHILS # BLD AUTO: 0.03 K/UL (ref 0–0.2)
BASOPHILS NFR BLD: 0.3 % (ref 0–1.9)
BILIRUB DIRECT SERPL-MCNC: 0.2 MG/DL (ref 0.1–0.3)
BILIRUB SERPL-MCNC: 0.7 MG/DL (ref 0.1–1)
BLD GP AB SCN CELLS X3 SERPL QL: NORMAL
BUN SERPL-MCNC: 33 MG/DL (ref 8–23)
CALCIUM SERPL-MCNC: 8.5 MG/DL (ref 8.7–10.5)
CHLORIDE SERPL-SCNC: 113 MMOL/L (ref 95–110)
CO2 SERPL-SCNC: 20 MMOL/L (ref 23–29)
CREAT SERPL-MCNC: 1.6 MG/DL (ref 0.5–1.4)
DIFFERENTIAL METHOD: ABNORMAL
EOSINOPHIL # BLD AUTO: 0.3 K/UL (ref 0–0.5)
EOSINOPHIL NFR BLD: 2.4 % (ref 0–8)
ERYTHROCYTE [DISTWIDTH] IN BLOOD BY AUTOMATED COUNT: 13.4 % (ref 11.5–14.5)
EST. GFR  (NO RACE VARIABLE): 31 ML/MIN/1.73 M^2
GLUCOSE SERPL-MCNC: 99 MG/DL (ref 70–110)
HCT VFR BLD AUTO: 32 % (ref 37–48.5)
HGB BLD-MCNC: 10.1 G/DL (ref 12–16)
IMM GRANULOCYTES # BLD AUTO: 0.05 K/UL (ref 0–0.04)
IMM GRANULOCYTES NFR BLD AUTO: 0.5 % (ref 0–0.5)
INR PPP: 1 (ref 0.8–1.2)
LYMPHOCYTES # BLD AUTO: 1 K/UL (ref 1–4.8)
LYMPHOCYTES NFR BLD: 9.7 % (ref 18–48)
MAGNESIUM SERPL-MCNC: 2 MG/DL (ref 1.6–2.6)
MCH RBC QN AUTO: 29.8 PG (ref 27–31)
MCHC RBC AUTO-ENTMCNC: 31.6 G/DL (ref 32–36)
MCV RBC AUTO: 94 FL (ref 82–98)
MONOCYTES # BLD AUTO: 0.8 K/UL (ref 0.3–1)
MONOCYTES NFR BLD: 7.3 % (ref 4–15)
NEUTROPHILS # BLD AUTO: 8.6 K/UL (ref 1.8–7.7)
NEUTROPHILS NFR BLD: 79.8 % (ref 38–73)
NRBC BLD-RTO: 0 /100 WBC
PHOSPHATE SERPL-MCNC: 3.3 MG/DL (ref 2.7–4.5)
PLATELET # BLD AUTO: 217 K/UL (ref 150–450)
PMV BLD AUTO: 10.6 FL (ref 9.2–12.9)
POTASSIUM SERPL-SCNC: 4.4 MMOL/L (ref 3.5–5.1)
PROT SERPL-MCNC: 6.6 G/DL (ref 6–8.4)
PROTHROMBIN TIME: 10.6 SEC (ref 9–12.5)
RBC # BLD AUTO: 3.39 M/UL (ref 4–5.4)
SODIUM SERPL-SCNC: 140 MMOL/L (ref 136–145)
SPECIMEN OUTDATE: NORMAL
WBC # BLD AUTO: 10.75 K/UL (ref 3.9–12.7)

## 2023-04-10 PROCEDURE — D9220A PRA ANESTHESIA: Mod: CRNA,,, | Performed by: NURSE ANESTHETIST, CERTIFIED REGISTERED

## 2023-04-10 PROCEDURE — 63600175 PHARM REV CODE 636 W HCPCS: Performed by: NURSE ANESTHETIST, CERTIFIED REGISTERED

## 2023-04-10 PROCEDURE — 80076 HEPATIC FUNCTION PANEL: CPT | Performed by: STUDENT IN AN ORGANIZED HEALTH CARE EDUCATION/TRAINING PROGRAM

## 2023-04-10 PROCEDURE — 37000009 HC ANESTHESIA EA ADD 15 MINS: Performed by: ORTHOPAEDIC SURGERY

## 2023-04-10 PROCEDURE — 71000033 HC RECOVERY, INTIAL HOUR: Performed by: ORTHOPAEDIC SURGERY

## 2023-04-10 PROCEDURE — 80048 BASIC METABOLIC PNL TOTAL CA: CPT | Performed by: STUDENT IN AN ORGANIZED HEALTH CARE EDUCATION/TRAINING PROGRAM

## 2023-04-10 PROCEDURE — 84100 ASSAY OF PHOSPHORUS: CPT | Performed by: STUDENT IN AN ORGANIZED HEALTH CARE EDUCATION/TRAINING PROGRAM

## 2023-04-10 PROCEDURE — 85025 COMPLETE CBC W/AUTO DIFF WBC: CPT | Performed by: STUDENT IN AN ORGANIZED HEALTH CARE EDUCATION/TRAINING PROGRAM

## 2023-04-10 PROCEDURE — 36000710: Performed by: ORTHOPAEDIC SURGERY

## 2023-04-10 PROCEDURE — 36415 COLL VENOUS BLD VENIPUNCTURE: CPT | Performed by: STUDENT IN AN ORGANIZED HEALTH CARE EDUCATION/TRAINING PROGRAM

## 2023-04-10 PROCEDURE — D9220A PRA ANESTHESIA: ICD-10-PCS | Mod: ANES,,, | Performed by: ANESTHESIOLOGY

## 2023-04-10 PROCEDURE — 11000001 HC ACUTE MED/SURG PRIVATE ROOM

## 2023-04-10 PROCEDURE — 71000039 HC RECOVERY, EACH ADD'L HOUR: Performed by: ORTHOPAEDIC SURGERY

## 2023-04-10 PROCEDURE — 86900 BLOOD TYPING SEROLOGIC ABO: CPT | Performed by: STUDENT IN AN ORGANIZED HEALTH CARE EDUCATION/TRAINING PROGRAM

## 2023-04-10 PROCEDURE — D9220A PRA ANESTHESIA: Mod: ANES,,, | Performed by: ANESTHESIOLOGY

## 2023-04-10 PROCEDURE — 25000003 PHARM REV CODE 250: Performed by: STUDENT IN AN ORGANIZED HEALTH CARE EDUCATION/TRAINING PROGRAM

## 2023-04-10 PROCEDURE — D9220A PRA ANESTHESIA: ICD-10-PCS | Mod: CRNA,,, | Performed by: NURSE ANESTHETIST, CERTIFIED REGISTERED

## 2023-04-10 PROCEDURE — 37000008 HC ANESTHESIA 1ST 15 MINUTES: Performed by: ORTHOPAEDIC SURGERY

## 2023-04-10 PROCEDURE — 25000003 PHARM REV CODE 250: Performed by: NURSE ANESTHETIST, CERTIFIED REGISTERED

## 2023-04-10 PROCEDURE — 83735 ASSAY OF MAGNESIUM: CPT | Performed by: STUDENT IN AN ORGANIZED HEALTH CARE EDUCATION/TRAINING PROGRAM

## 2023-04-10 PROCEDURE — 85610 PROTHROMBIN TIME: CPT | Performed by: STUDENT IN AN ORGANIZED HEALTH CARE EDUCATION/TRAINING PROGRAM

## 2023-04-10 PROCEDURE — 63600175 PHARM REV CODE 636 W HCPCS: Performed by: STUDENT IN AN ORGANIZED HEALTH CARE EDUCATION/TRAINING PROGRAM

## 2023-04-10 PROCEDURE — 36000711: Performed by: ORTHOPAEDIC SURGERY

## 2023-04-10 PROCEDURE — C1713 ANCHOR/SCREW BN/BN,TIS/BN: HCPCS | Performed by: ORTHOPAEDIC SURGERY

## 2023-04-10 DEVICE — BOLT FEMORAL NECK SYS 85MM: Type: IMPLANTABLE DEVICE | Site: HIP | Status: FUNCTIONAL

## 2023-04-10 DEVICE — SCREW ANTIROTATION 85MM: Type: IMPLANTABLE DEVICE | Site: HIP | Status: FUNCTIONAL

## 2023-04-10 DEVICE — IMPLANTABLE DEVICE: Type: IMPLANTABLE DEVICE | Site: HIP | Status: FUNCTIONAL

## 2023-04-10 RX ORDER — ONDANSETRON 2 MG/ML
INJECTION INTRAMUSCULAR; INTRAVENOUS
Status: DISCONTINUED | OUTPATIENT
Start: 2023-04-10 | End: 2023-04-10

## 2023-04-10 RX ORDER — DEXAMETHASONE SODIUM PHOSPHATE 4 MG/ML
INJECTION, SOLUTION INTRA-ARTICULAR; INTRALESIONAL; INTRAMUSCULAR; INTRAVENOUS; SOFT TISSUE
Status: DISCONTINUED | OUTPATIENT
Start: 2023-04-10 | End: 2023-04-10

## 2023-04-10 RX ORDER — PHENYLEPHRINE HYDROCHLORIDE 10 MG/ML
INJECTION INTRAVENOUS
Status: DISCONTINUED | OUTPATIENT
Start: 2023-04-10 | End: 2023-04-10

## 2023-04-10 RX ORDER — FENTANYL CITRATE 50 UG/ML
INJECTION, SOLUTION INTRAMUSCULAR; INTRAVENOUS
Status: DISCONTINUED | OUTPATIENT
Start: 2023-04-10 | End: 2023-04-10

## 2023-04-10 RX ORDER — PROPOFOL 10 MG/ML
VIAL (ML) INTRAVENOUS
Status: DISCONTINUED | OUTPATIENT
Start: 2023-04-10 | End: 2023-04-10

## 2023-04-10 RX ORDER — LIDOCAINE HYDROCHLORIDE 20 MG/ML
INJECTION INTRAVENOUS
Status: DISCONTINUED | OUTPATIENT
Start: 2023-04-10 | End: 2023-04-10

## 2023-04-10 RX ADMIN — FENTANYL CITRATE 25 MCG: 50 INJECTION, SOLUTION INTRAMUSCULAR; INTRAVENOUS at 12:04

## 2023-04-10 RX ADMIN — CLONIDINE HYDROCHLORIDE 0.1 MG: 0.1 TABLET ORAL at 11:04

## 2023-04-10 RX ADMIN — PHENYLEPHRINE HYDROCHLORIDE 100 MCG: 10 INJECTION INTRAVENOUS at 12:04

## 2023-04-10 RX ADMIN — HEPARIN SODIUM 5000 UNITS: 5000 INJECTION INTRAVENOUS; SUBCUTANEOUS at 10:04

## 2023-04-10 RX ADMIN — PHENYLEPHRINE HYDROCHLORIDE 100 MCG: 10 INJECTION INTRAVENOUS at 01:04

## 2023-04-10 RX ADMIN — PROPOFOL 30 MG: 10 INJECTION, EMULSION INTRAVENOUS at 12:04

## 2023-04-10 RX ADMIN — LEVOTHYROXINE SODIUM 75 MCG: 75 TABLET ORAL at 07:04

## 2023-04-10 RX ADMIN — SODIUM CHLORIDE, SODIUM LACTATE, POTASSIUM CHLORIDE, AND CALCIUM CHLORIDE: .6; .31; .03; .02 INJECTION, SOLUTION INTRAVENOUS at 12:04

## 2023-04-10 RX ADMIN — CLONIDINE HYDROCHLORIDE 0.1 MG: 0.1 TABLET ORAL at 09:04

## 2023-04-10 RX ADMIN — FENTANYL CITRATE 50 MCG: 50 INJECTION, SOLUTION INTRAMUSCULAR; INTRAVENOUS at 12:04

## 2023-04-10 RX ADMIN — Medication 324 MG: at 04:04

## 2023-04-10 RX ADMIN — ONDANSETRON 4 MG: 2 INJECTION, SOLUTION INTRAMUSCULAR; INTRAVENOUS at 12:04

## 2023-04-10 RX ADMIN — CEFAZOLIN SODIUM 2 G: 1 POWDER, FOR SOLUTION INTRAMUSCULAR; INTRAVENOUS at 12:04

## 2023-04-10 RX ADMIN — PROPOFOL 100 MG: 10 INJECTION, EMULSION INTRAVENOUS at 12:04

## 2023-04-10 RX ADMIN — DEXAMETHASONE SODIUM PHOSPHATE 4 MG: 4 INJECTION, SOLUTION INTRAMUSCULAR; INTRAVENOUS at 12:04

## 2023-04-10 RX ADMIN — LIDOCAINE HYDROCHLORIDE 60 MG: 20 INJECTION, SOLUTION INTRAVENOUS at 12:04

## 2023-04-10 NOTE — SUBJECTIVE & OBJECTIVE
Interval History: To the OR today    Review of Systems   Constitutional:  Negative for chills and fever.   Respiratory:  Negative for cough and shortness of breath.    Cardiovascular:  Negative for chest pain and leg swelling.   Gastrointestinal:  Negative for abdominal distention and abdominal pain.   Objective:     Vital Signs (Most Recent):  Temp: 99.2 °F (37.3 °C) (04/10/23 0727)  Pulse: 89 (04/10/23 0727)  Resp: 18 (04/10/23 0727)  BP: (!) 164/74 (04/10/23 1100)  SpO2: (!) 94 % (04/10/23 0727)   Vital Signs (24h Range):  Temp:  [97.9 °F (36.6 °C)-100 °F (37.8 °C)] 99.2 °F (37.3 °C)  Pulse:  [71-95] 89  Resp:  [18-20] 18  SpO2:  [94 %-99 %] 94 %  BP: (100-189)/(52-91) 164/74     Weight: 80.7 kg (177 lb 14.6 oz)  Body mass index is 25.53 kg/m².    Intake/Output Summary (Last 24 hours) at 4/10/2023 1304  Last data filed at 4/9/2023 2252  Gross per 24 hour   Intake 240 ml   Output --   Net 240 ml      Physical Exam  Constitutional:       General: She is not in acute distress.     Appearance: She is ill-appearing. She is not toxic-appearing or diaphoretic.   Cardiovascular:      Rate and Rhythm: Normal rate and regular rhythm.      Heart sounds: No murmur heard.    No gallop.   Pulmonary:      Effort: Pulmonary effort is normal. No respiratory distress.      Breath sounds: Normal breath sounds. No wheezing.   Abdominal:      General: Bowel sounds are normal. There is no distension.      Palpations: Abdomen is soft.      Tenderness: There is no abdominal tenderness.       Significant Labs: All pertinent labs within the past 24 hours have been reviewed.    Significant Imaging: I have reviewed all pertinent imaging results/findings within the past 24 hours.

## 2023-04-10 NOTE — PLAN OF CARE
Plan of care is ongoing.    Problem: Adult Inpatient Plan of Care  Goal: Plan of Care Review  Outcome: Ongoing, Progressing  Goal: Patient-Specific Goal (Individualized)  Outcome: Ongoing, Progressing  Goal: Absence of Hospital-Acquired Illness or Injury  Outcome: Ongoing, Progressing  Goal: Optimal Comfort and Wellbeing  Outcome: Ongoing, Progressing  Goal: Readiness for Transition of Care  Outcome: Ongoing, Progressing     Problem: Fall Injury Risk  Goal: Absence of Fall and Fall-Related Injury  Outcome: Ongoing, Progressing     Problem: Skin Injury Risk Increased  Goal: Skin Health and Integrity  Outcome: Ongoing, Progressing

## 2023-04-10 NOTE — TRANSFER OF CARE
Anesthesia Transfer of Care Note    Patient: Daniela Harris    Procedure(s) Performed: Procedure(s) (LRB):  ORIF, HIP PINNING SYTHNES (Left)    Patient location: PACU    Anesthesia Type: general    Transport from OR: Transported from OR on room air with adequate spontaneous ventilation    Post pain: adequate analgesia    Post assessment: no apparent anesthetic complications and tolerated procedure well    Post vital signs: stable    Level of consciousness: sedated    Nausea/Vomiting: no nausea/vomiting    Complications: none    Transfer of care protocol was followed      Last vitals:   Visit Vitals  BP (!) 127/96 (BP Location: Left arm, Patient Position: Lying)   Pulse 72   Temp 36.1 °C (97 °F) (Temporal)   Resp 15   Wt 80.7 kg (177 lb 14.6 oz)   LMP  (LMP Unknown)   SpO2 97%   BMI 25.53 kg/m²

## 2023-04-10 NOTE — PROGRESS NOTES
[facility] Medicine  Progress Note    Patient Name: Daniela Harris  MRN: [id number]  Patient Class: IP- Inpatient   Admission Date: 4/9/2023  Length of Stay: 1 days  Attending Physician: Dereje Wayne MD  Primary Care Provider: Tayo Varela MD        Subjective:     Principal Problem:Closed fracture of neck of left femur        HPI:  This is an 83-year-old female with a past medical history of Alzheimer's dementia, hypertension, hyperlipidemia, CKD 4, hypothyroidism, PUD, who presents after a fall.    Patient had an unwitnessed fall the morning of presentation while she was at her nursing home.  Per the patient's son, Jayant, she was left sitting on the edge of the bed and later fell down to the floor, unknown if she hit her head.  She uses a walker to ambulate at baseline.  Patient has a history of dementia and is unable to contribute to the history.    In the ED, the patient was hypertensive.  Labs remarkable for leukocytosis (13.4), normocytic anemia (10.7-baseline 11-12), elevated creatinine (1.6-around baseline).  UA showed +3 leukocytes, > 100 WBCs, and many bacteria.  CT head/cervical spine showed no acute process.  Left CT hip showed a transcervical femoral neck fracture.  The patient was admitted for further management.      Overview/Hospital Course:  Ortho consulted, taken to the OR      Interval History: To the OR today    Review of Systems   Constitutional:  Negative for chills and fever.   Respiratory:  Negative for cough and shortness of breath.    Cardiovascular:  Negative for chest pain and leg swelling.   Gastrointestinal:  Negative for abdominal distention and abdominal pain.   Objective:     Vital Signs (Most Recent):  Temp: 99.2 °F (37.3 °C) (04/10/23 0727)  Pulse: 89 (04/10/23 0727)  Resp: 18 (04/10/23 0727)  BP: (!) 164/74 (04/10/23 1100)  SpO2: (!) 94 % (04/10/23 0727)   Vital Signs (24h Range):  Temp:  [97.9 °F (36.6 °C)-100 °F (37.8 °C)] 99.2 °F (37.3 °C)  Pulse:   [71-95] 89  Resp:  [18-20] 18  SpO2:  [94 %-99 %] 94 %  BP: (100-189)/(52-91) 164/74     Weight: 80.7 kg (177 lb 14.6 oz)  Body mass index is 25.53 kg/m².    Intake/Output Summary (Last 24 hours) at 4/10/2023 1304  Last data filed at 4/9/2023 2252  Gross per 24 hour   Intake 240 ml   Output --   Net 240 ml      Physical Exam  Constitutional:       General: She is not in acute distress.     Appearance: She is ill-appearing. She is not toxic-appearing or diaphoretic.   Cardiovascular:      Rate and Rhythm: Normal rate and regular rhythm.      Heart sounds: No murmur heard.    No gallop.   Pulmonary:      Effort: Pulmonary effort is normal. No respiratory distress.      Breath sounds: Normal breath sounds. No wheezing.   Abdominal:      General: Bowel sounds are normal. There is no distension.      Palpations: Abdomen is soft.      Tenderness: There is no abdominal tenderness.       Significant Labs: All pertinent labs within the past 24 hours have been reviewed.    Significant Imaging: I have reviewed all pertinent imaging results/findings within the past 24 hours.      Assessment/Plan:      * Closed fracture of neck of left femur, non displaced   Presented after an unwitnessed fall.    Left CT hip showed a transcervical femoral neck fracture.    Plan:   Orthopedic surgery consult - to OR today  Pain control   DVT prophylaxis   Preop labs.    CKD (chronic kidney disease) stage 4  At baseline.  Continue to monitor.      Late onset Alzheimer's disease without behavioral disturbance  No acute issues.      Hypothyroidism  Resume home meds      Hypertension  Resume home meds      Hyperlipidemia  Outpatient follow-up.        VTE Risk Mitigation (From admission, onward)         Ordered     heparin (porcine) injection 5,000 Units  Every 8 hours         04/09/23 1911     IP VTE HIGH RISK PATIENT  Once         04/09/23 1911     Place sequential compression device  Until discontinued         04/09/23 1911                 Discharge Planning   ARNEL:      Code Status: Full Code   Is the patient medically ready for discharge?:     Reason for patient still in hospital (select all that apply): Patient trending condition, Laboratory test, Treatment, Consult recommendations and Pending disposition                     Dereje Wayne MD  Department of [facility] Medicine   [facility]

## 2023-04-10 NOTE — ANESTHESIA PREPROCEDURE EVALUATION
04/10/2023  Daniela Harris is a 83 y.o., female.  Past Medical History:   Diagnosis Date    After-cataract, obscuring vision - Left Eye 2/28/2014    Arthritis     Diverticulitis     Goiter     MNG    History of appendectomy     Hyperlipidemia     following diet    Hypothyroidism     s/p surgery    Osteopenia     Other and unspecified hyperlipidemia     Peptic ulcer     x 2    Psoriasis     no active illness for years    Stroke     Unspecified essential hypertension     Unspecified vitamin D deficiency     Vitamin B 12 deficiency        Pre-op Assessment    I have reviewed the Patient Summary Reports.     I have reviewed the Nursing Notes.    I have reviewed the Medications.     Review of Systems  Anesthesia Hx:  No problems with previous Anesthesia  Neg history of prior surgery. Denies Family Hx of Anesthesia complications.   Denies Personal Hx of Anesthesia complications.   Social:  Non-Smoker, No Alcohol Use    Hematology/Oncology:  Hematology Normal   Oncology Normal     EENT/Dental:EENT/Dental Normal   Cardiovascular:   Exercise tolerance: good Hypertension hyperlipidemia    Pulmonary:  Pulmonary Normal    Renal/:   Chronic Renal Disease, CKD    Hepatic/GI:   PUD,    Musculoskeletal:  Musculoskeletal Normal    Neurological:   CVA  Dementia    Endocrine:   Hypothyroidism    Dermatological:  Skin Normal    Psych:  Psychiatric Normal         Lab Results   Component Value Date    WBC 10.75 04/10/2023    HGB 10.1 (L) 04/10/2023    HCT 32.0 (L) 04/10/2023    MCV 94 04/10/2023     04/10/2023         Chemistry        Component Value Date/Time     04/10/2023 0613    K 4.4 04/10/2023 0613     (H) 04/10/2023 0613    CO2 20 (L) 04/10/2023 0613    BUN 33 (H) 04/10/2023 0613    CREATININE 1.6 (H) 04/10/2023 0613    GLU 99 04/10/2023 0613        Component Value Date/Time    CALCIUM  8.5 (L) 04/10/2023 0613    ALKPHOS 71 04/10/2023 0613    AST 31 04/10/2023 0613    ALT 21 04/10/2023 0613    BILITOT 0.7 04/10/2023 0613    ESTGFRAFRICA 28 (A) 07/25/2022 1501    EGFRNONAA 24 (A) 07/25/2022 1501            Physical Exam  General: Well nourished    Airway:  Mallampati: II   Mouth Opening: Normal  Tongue: Normal  Neck ROM: Normal ROM    Dental:  Intact    Chest/Lungs:  Clear to auscultation    Heart:  Rate: Normal  Rhythm: Regular Rhythm  Sounds: Normal        Anesthesia Plan  Type of Anesthesia, risks & benefits discussed:    Anesthesia Type: Gen Natural Airway  Intra-op Monitoring Plan: Standard ASA Monitors  Induction:  IV  Informed Consent: Informed consent signed with the Patient representative and all parties understand the risks and agree with anesthesia plan.  All questions answered. Patient consented to blood products? Yes  ASA Score: 3  Anesthesia Plan Notes: UTI    Ready For Surgery From Anesthesia Perspective.     .

## 2023-04-10 NOTE — CONSULTS
[facility] - Med Surg  Orthopedics  Consult Note    Patient Name: Daniela Harris  MRN: [id number]  Admission Date: 4/9/2023  Hospital Length of Stay: 1 days  Attending Provider: Dereje Wayne MD  Primary Care Provider: Tayo Varela MD    Patient information was obtained from relative(s) and primary team.     Inpatient consult to Orthopedic Surgery  Consult performed by: Britt Garcia MD  Consult ordered by: Silviano Garcia MD      Subjective:     Principal Problem:Closed fracture of neck of left femur    Chief Complaint: No chief complaint on file.       HPI: Patient is an 83 year old female with history of dementia s/p fall at nursing facility yesterday with pain to left hip. Unwitnessed. Apparently fell from sitting at the edge of the bed. Patient uses a walker to ambulate short distances only at baseline. Lately she is transitioning to using a wheelchair more, per son.     Patient initially seen in outside ED yesterday evening and transferred to [facility] last night. Ortho consult order was placed but never actually called in to consultant.     Patient is demented, history provided by son, who also makes medical decisions.    Past Medical History:   Diagnosis Date    After-cataract, obscuring vision - Left Eye 2/28/2014    Arthritis     Diverticulitis     Goiter     MNG    History of appendectomy     Hyperlipidemia     following diet    Hypothyroidism     s/p surgery    Osteopenia     Other and unspecified hyperlipidemia     Peptic ulcer     x 2    Psoriasis     no active illness for years    Stroke     Unspecified essential hypertension     Unspecified vitamin D deficiency     Vitamin B 12 deficiency        Past Surgical History:   Procedure Laterality Date    APPENDECTOMY      CATARACT EXTRACTION      ou    CATARACT EXTRACTION W/ INTRAOCULAR LENS  IMPLANT, BILATERAL      COLONOSCOPY      ESOPHAGOGASTRODUODENOSCOPY      THYROID SURGERY      Total    TONSILLECTOMY      TONSILLECTOMY, ADENOIDECTOMY       TUBAL LIGATION         Review of patient's allergies indicates:   Allergen Reactions    Cephalexin Rash     Other reaction(s): Rash       Current Facility-Administered Medications   Medication    acetaminophen tablet 650 mg    acetaminophen tablet 650 mg    aluminum-magnesium hydroxide-simethicone 200-200-20 mg/5 mL suspension 30 mL    bisacodyL suppository 10 mg    cloNIDine tablet 0.1 mg    dextrose 50% injection 12.5 g    dextrose 50% injection 25 g    ferrous gluconate tablet 324 mg    glucagon (human recombinant) injection 1 mg    glucose chewable tablet 16 g    glucose chewable tablet 24 g    heparin (porcine) injection 5,000 Units    HYDROcodone-acetaminophen 5-325 mg per tablet 1 tablet    levothyroxine tablet 75 mcg    lisinopriL tablet 20 mg    magnesium oxide tablet 800 mg    magnesium oxide tablet 800 mg    melatonin tablet 6 mg    morphine injection 3 mg    naloxone 0.4 mg/mL injection 0.02 mg    ondansetron injection 4 mg    pantoprazole EC tablet 40 mg    polyethylene glycol packet 17 g    potassium bicarbonate disintegrating tablet 35 mEq    potassium bicarbonate disintegrating tablet 50 mEq    potassium bicarbonate disintegrating tablet 60 mEq    potassium, sodium phosphates 280-160-250 mg packet 2 packet    potassium, sodium phosphates 280-160-250 mg packet 2 packet    potassium, sodium phosphates 280-160-250 mg packet 2 packet    prochlorperazine injection Soln 5 mg    simethicone chewable tablet 80 mg    sodium chloride 0.9% flush 10 mL     Family History       Problem Relation (Age of Onset)    Alzheimer's disease Father    Cataracts Father    Dementia Father, Maternal Uncle    Diabetes Sister, Mother    Hyperlipidemia Sister    Hypertension Father, Sister, Sister    No Known Problems Brother, Maternal Aunt, Paternal Aunt, Paternal Uncle, Maternal Grandfather, Paternal Grandmother, Paternal Grandfather    Parkinsonism Sister    Thyroid disease Sister, Maternal Grandmother, Cousin           Tobacco Use    Smoking status: Never    Smokeless tobacco: Never   Substance and Sexual Activity    Alcohol use: No    Drug use: No    Sexual activity: Not on file     ROS  Objective:     Vital Signs (Most Recent):  Temp: 99.2 °F (37.3 °C) (04/10/23 0727)  Pulse: 89 (04/10/23 0727)  Resp: 18 (04/10/23 0727)  BP: (!) 150/69 (04/10/23 0727)  SpO2: (!) 94 % (04/10/23 0727)   Vital Signs (24h Range):  Temp:  [97.9 °F (36.6 °C)-100 °F (37.8 °C)] 99.2 °F (37.3 °C)  Pulse:  [70-95] 89  Resp:  [16-20] 18  SpO2:  [94 %-99 %] 94 %  BP: (100-189)/(52-91) 150/69     Weight: 80.7 kg (177 lb 14.6 oz)     Body mass index is 25.53 kg/m².      Intake/Output Summary (Last 24 hours) at 4/10/2023 0948  Last data filed at 4/9/2023 2252  Gross per 24 hour   Intake 240 ml   Output --   Net 240 ml       Ortho/SPM Exam    Significant Labs: CBC:   Recent Labs   Lab 04/09/23  1243 04/10/23  0613   WBC 13.49* 10.75   HGB 10.7* 10.1*   HCT 32.2* 32.0*    217     All pertinent labs within the past 24 hours have been reviewed.    Significant Imaging:  XR and CT of L hip show a valgus impacted femoral neck fracture.    Assessment/Plan:   L femoral neck fracture.   - Treatment options discussed with patient's son. Agrees to proceed with L femoral neck screw fixation. All risks, benefits, and alternatives to surgery were discussed with the patient.  Risks of surgery include but are not limited to bleeding, infection, nerve or vessel injury, pain, stiffness, malunion or nonunion, hardware complication, functional limitation, need for additional procedures, complications associated with anesthesia or underlying medical conditions, and death.  All questions were answered and the son consented to proceed with surgery.     - To OR today. Keep NPO.    Britt Garcia MD  Orthopedics  [facility]

## 2023-04-10 NOTE — ANESTHESIA PROCEDURE NOTES
Intubation    Date/Time: 4/10/2023 12:29 PM  Performed by: Raffi Magaña CRNA  Authorized by: Lindsey Dahl MD     Intubation:     Induction:  Intravenous    Intubated:  Postinduction    Mask Ventilation:  N/a    Attempts:  1    Attempted By:  Student    Difficult Airway Encountered?: No      Complications:  None    Airway Device:  Supraglottic airway/LMA    Airway Device Size:  4.0    Style/Cuff Inflation:  Cuffed (inflated to minimal occlusive pressure)    Placement Verified By:  Capnometry    Complicating Factors:  None    Findings Post-Intubation:  BS equal bilateral and atraumatic/condition of teeth unchanged

## 2023-04-10 NOTE — NURSING
[facility]  Nurses Note -- 4 Eyes      4/10/2023       Skin assessed on: Admit      [x] No Pressure Injuries Present    [x]Prevention Measures Documented    [] Yes LDA  for Pressure Injury Previously documented     [] Yes New Pressure Injury Discovered   [] LDA for New Pressure Injury Added      Attending RN:  Mireille Mena, RN     Second RN:  Shane Solitario, PCA

## 2023-04-10 NOTE — SUBJECTIVE & OBJECTIVE
Past Medical History:   Diagnosis Date    After-cataract, obscuring vision - Left Eye 2/28/2014    Arthritis     Diverticulitis     Goiter     MNG    History of appendectomy     Hyperlipidemia     following diet    Hypothyroidism     s/p surgery    Osteopenia     Other and unspecified hyperlipidemia     Peptic ulcer     x 2    Psoriasis     no active illness for years    Stroke     Unspecified essential hypertension     Unspecified vitamin D deficiency     Vitamin B 12 deficiency        Past Surgical History:   Procedure Laterality Date    APPENDECTOMY      CATARACT EXTRACTION      ou    CATARACT EXTRACTION W/ INTRAOCULAR LENS  IMPLANT, BILATERAL      COLONOSCOPY      ESOPHAGOGASTRODUODENOSCOPY      THYROID SURGERY      Total    TONSILLECTOMY      TONSILLECTOMY, ADENOIDECTOMY      TUBAL LIGATION         Review of patient's allergies indicates:   Allergen Reactions    Cephalexin Rash     Other reaction(s): Rash       Current Facility-Administered Medications on File Prior to Encounter   Medication    [DISCONTINUED] levoFLOXacin 750 mg/150 mL IVPB 750 mg     Current Outpatient Medications on File Prior to Encounter   Medication Sig    acetaminophen (TYLENOL EXTRA STRENGTH) 500 MG tablet Take 2 tablets (1,000 mg total) by mouth 3 (three) times daily as needed for Pain.    acetylcysteine 600 mg Cap TAKE ONE CAPSULE BY MOUTH EVERY DAY FOR RESPIRATORY HEALTH    ascorbic acid, vitamin C, (VITAMIN C) 500 MG tablet Take 1 tablet (500 mg total) by mouth once daily. 1 Tablet Oral Every day    b complex vitamins tablet Take 1 tablet by mouth once daily.    calcium carbonate (OS-GRIFFIN) 600 mg calcium (1,500 mg) Tab Take 1 tablet (600 mg total) by mouth once daily.    cloNIDine (CATAPRES) 0.1 MG tablet TAKE ONE TABLET BY MOUTH TWICE DAILY    ferrous gluconate (FERGON) 324 MG tablet TAKE ONE TABLET BY MOUTH TWICE DAILY WITH MEALS    glucosamine-chondroitn sulf.Na 750-400 mg Cmpk Take 1 tablet by mouth once daily. 1  By mouth Every  day    levothyroxine (SYNTHROID) 75 MCG tablet TAKE ONE TABLET BY MOUTH IN THE MORNING ON AN EMPTY STOMACH    lisinopriL (PRINIVIL,ZESTRIL) 20 MG tablet TAKE ONE TABLET BY MOUTH EVERY DAY    multivitamin (THERAGRAN) per tablet Take 1 tablet by mouth once daily. 1 Tablet Oral Every day    pantoprazole (PROTONIX) 40 MG tablet TAKE ONE TABLET BY MOUTH ONCE DAILY AS NEEDED    tobramycin-dexamethasone 0.3-0.1% (TOBRADEX) 0.3-0.1 % DrpS Place 1 drop into both eyes 2 (two) times a day. For one week     Family History       Problem Relation (Age of Onset)    Alzheimer's disease Father    Cataracts Father    Dementia Father, Maternal Uncle    Diabetes Sister, Mother    Hyperlipidemia Sister    Hypertension Father, Sister, Sister    No Known Problems Brother, Maternal Aunt, Paternal Aunt, Paternal Uncle, Maternal Grandfather, Paternal Grandmother, Paternal Grandfather    Parkinsonism Sister    Thyroid disease Sister, Maternal Grandmother, Cousin          Tobacco Use    Smoking status: Never    Smokeless tobacco: Never   Substance and Sexual Activity    Alcohol use: No    Drug use: No    Sexual activity: Not on file     Review of Systems   Unable to perform ROS: Dementia   Objective:     Vital Signs (Most Recent):  Temp: 97.9 °F (36.6 °C) (04/09/23 1822)  Pulse: 79 (04/09/23 1822)  Resp: 18 (04/09/23 1822)  BP: (!) 175/87 (04/09/23 1822)  SpO2: 98 % (04/09/23 1822)   Vital Signs (24h Range):  Temp:  [97.9 °F (36.6 °C)-98.7 °F (37.1 °C)] 97.9 °F (36.6 °C)  Pulse:  [70-84] 79  Resp:  [16-20] 18  SpO2:  [97 %-98 %] 98 %  BP: (175-181)/(87-91) 175/87        There is no height or weight on file to calculate BMI.    Physical Exam  Vitals and nursing note reviewed.   Constitutional:       General: She is not in acute distress.     Appearance: Normal appearance. She is not ill-appearing.   HENT:      Head: Normocephalic and atraumatic.      Nose: Nose normal.      Mouth/Throat:      Mouth: Mucous membranes are moist.   Eyes:       Extraocular Movements: Extraocular movements intact.   Cardiovascular:      Rate and Rhythm: Normal rate.      Pulses: Normal pulses.      Heart sounds: No murmur heard.  Pulmonary:      Effort: Pulmonary effort is normal. No respiratory distress.   Abdominal:      General: Abdomen is flat.      Palpations: Abdomen is soft.      Tenderness: There is no abdominal tenderness.   Musculoskeletal:      Right lower leg: No edema.      Left lower leg: No edema.      Comments: Unable to move left lower extremity    Skin:     General: Skin is warm.      Capillary Refill: Capillary refill takes less than 2 seconds.   Neurological:      Mental Status: She is alert.      Comments: A&Ox0           Significant Labs: All pertinent labs within the past 24 hours have been reviewed.    Significant Imaging: I have reviewed all pertinent imaging results/findings within the past 24 hours.

## 2023-04-10 NOTE — ASSESSMENT & PLAN NOTE
Presented after an unwitnessed fall.    Left CT hip showed a transcervical femoral neck fracture.    Plan:   Orthopedic surgery consult   Pain control   DVT prophylaxis   NPO after midnight   Preop labs.

## 2023-04-10 NOTE — PLAN OF CARE
NPO after midnight and surgical bath was completed by CNA. BLE SCDs, offloading boots, foam dressing in place. Bed alarm on, free of falls. Continue with plan of care as ordered. Bed side report given to oncoming nurse JASPREET Barillas. No distress noted.   Problem: Adult Inpatient Plan of Care  Goal: Plan of Care Review  Outcome: Ongoing, Progressing  Goal: Patient-Specific Goal (Individualized)  Outcome: Ongoing, Progressing  Goal: Optimal Comfort and Wellbeing  Outcome: Ongoing, Progressing  Goal: Readiness for Transition of Care  Outcome: Ongoing, Progressing     Problem: Fall Injury Risk  Goal: Absence of Fall and Fall-Related Injury  Outcome: Ongoing, Progressing     Problem: Skin Injury Risk Increased  Goal: Skin Health and Integrity  Outcome: Ongoing, Progressing

## 2023-04-10 NOTE — NURSING
came to patient;s beside to assess patient and update patient son, Jayant, present at bedside.   Patient in bed,Clonidine to be given with small sip of water per  and Dr. Garcia.  eyes closed. No acute distress noted at this time. Will continue to monitor.

## 2023-04-10 NOTE — H&P
[facility] Medicine  History & Physical    Patient Name: Daniela Harris  MRN: [id number]  Patient Class: IP- Inpatient  Admission Date: 4/9/2023  Attending Physician: Dharmesh Matthews III, MD   Primary Care Provider: Tayo Varela MD         Patient information was obtained from patient and ER records.     Subjective:     Principal Problem:Closed fracture of neck of left femur    Chief Complaint: No chief complaint on file.       HPI: This is an 83-year-old female with a past medical history of Alzheimer's dementia, hypertension, hyperlipidemia, CKD 4, hypothyroidism, PUD, who presents after a fall.    Patient had an unwitnessed fall the morning of presentation while she was at her nursing home.  Per the patient's son, Jayant, she was left sitting on the edge of the bed and later fell down to the floor, unknown if she hit her head.  She uses a walker to ambulate at baseline.  Patient has a history of dementia and is unable to contribute to the history.    In the ED, the patient was hypertensive.  Labs remarkable for leukocytosis (13.4), normocytic anemia (10.7-baseline 11-12), elevated creatinine (1.6-around baseline).  UA showed +3 leukocytes, > 100 WBCs, and many bacteria.  CT head/cervical spine showed no acute process.  Left CT hip showed a transcervical femoral neck fracture.  The patient was admitted for further management.      Past Medical History:   Diagnosis Date    After-cataract, obscuring vision - Left Eye 2/28/2014    Arthritis     Diverticulitis     Goiter     MNG    History of appendectomy     Hyperlipidemia     following diet    Hypothyroidism     s/p surgery    Osteopenia     Other and unspecified hyperlipidemia     Peptic ulcer     x 2    Psoriasis     no active illness for years    Stroke     Unspecified essential hypertension     Unspecified vitamin D deficiency     Vitamin B 12 deficiency        Past Surgical History:   Procedure Laterality Date     APPENDECTOMY      CATARACT EXTRACTION      ou    CATARACT EXTRACTION W/ INTRAOCULAR LENS  IMPLANT, BILATERAL      COLONOSCOPY      ESOPHAGOGASTRODUODENOSCOPY      THYROID SURGERY      Total    TONSILLECTOMY      TONSILLECTOMY, ADENOIDECTOMY      TUBAL LIGATION         Review of patient's allergies indicates:   Allergen Reactions    Cephalexin Rash     Other reaction(s): Rash       Current Facility-Administered Medications on File Prior to Encounter   Medication    [DISCONTINUED] levoFLOXacin 750 mg/150 mL IVPB 750 mg     Current Outpatient Medications on File Prior to Encounter   Medication Sig    acetaminophen (TYLENOL EXTRA STRENGTH) 500 MG tablet Take 2 tablets (1,000 mg total) by mouth 3 (three) times daily as needed for Pain.    acetylcysteine 600 mg Cap TAKE ONE CAPSULE BY MOUTH EVERY DAY FOR RESPIRATORY HEALTH    ascorbic acid, vitamin C, (VITAMIN C) 500 MG tablet Take 1 tablet (500 mg total) by mouth once daily. 1 Tablet Oral Every day    b complex vitamins tablet Take 1 tablet by mouth once daily.    calcium carbonate (OS-GRIFFIN) 600 mg calcium (1,500 mg) Tab Take 1 tablet (600 mg total) by mouth once daily.    cloNIDine (CATAPRES) 0.1 MG tablet TAKE ONE TABLET BY MOUTH TWICE DAILY    ferrous gluconate (FERGON) 324 MG tablet TAKE ONE TABLET BY MOUTH TWICE DAILY WITH MEALS    glucosamine-chondroitn sulf.Na 750-400 mg Cmpk Take 1 tablet by mouth once daily. 1  By mouth Every day    levothyroxine (SYNTHROID) 75 MCG tablet TAKE ONE TABLET BY MOUTH IN THE MORNING ON AN EMPTY STOMACH    lisinopriL (PRINIVIL,ZESTRIL) 20 MG tablet TAKE ONE TABLET BY MOUTH EVERY DAY    multivitamin (THERAGRAN) per tablet Take 1 tablet by mouth once daily. 1 Tablet Oral Every day    pantoprazole (PROTONIX) 40 MG tablet TAKE ONE TABLET BY MOUTH ONCE DAILY AS NEEDED    tobramycin-dexamethasone 0.3-0.1% (TOBRADEX) 0.3-0.1 % DrpS Place 1 drop into both eyes 2 (two) times a day. For one week     Family History        Problem Relation (Age of Onset)    Alzheimer's disease Father    Cataracts Father    Dementia Father, Maternal Uncle    Diabetes Sister, Mother    Hyperlipidemia Sister    Hypertension Father, Sister, Sister    No Known Problems Brother, Maternal Aunt, Paternal Aunt, Paternal Uncle, Maternal Grandfather, Paternal Grandmother, Paternal Grandfather    Parkinsonism Sister    Thyroid disease Sister, Maternal Grandmother, Cousin          Tobacco Use    Smoking status: Never    Smokeless tobacco: Never   Substance and Sexual Activity    Alcohol use: No    Drug use: No    Sexual activity: Not on file     Review of Systems   Unable to perform ROS: Dementia   Objective:     Vital Signs (Most Recent):  Temp: 97.9 °F (36.6 °C) (04/09/23 1822)  Pulse: 79 (04/09/23 1822)  Resp: 18 (04/09/23 1822)  BP: (!) 175/87 (04/09/23 1822)  SpO2: 98 % (04/09/23 1822)   Vital Signs (24h Range):  Temp:  [97.9 °F (36.6 °C)-98.7 °F (37.1 °C)] 97.9 °F (36.6 °C)  Pulse:  [70-84] 79  Resp:  [16-20] 18  SpO2:  [97 %-98 %] 98 %  BP: (175-181)/(87-91) 175/87        There is no height or weight on file to calculate BMI.    Physical Exam  Vitals and nursing note reviewed.   Constitutional:       General: She is not in acute distress.     Appearance: Normal appearance. She is not ill-appearing.   HENT:      Head: Normocephalic and atraumatic.      Nose: Nose normal.      Mouth/Throat:      Mouth: Mucous membranes are moist.   Eyes:      Extraocular Movements: Extraocular movements intact.   Cardiovascular:      Rate and Rhythm: Normal rate.      Pulses: Normal pulses.      Heart sounds: No murmur heard.  Pulmonary:      Effort: Pulmonary effort is normal. No respiratory distress.   Abdominal:      General: Abdomen is flat.      Palpations: Abdomen is soft.      Tenderness: There is no abdominal tenderness.   Musculoskeletal:      Right lower leg: No edema.      Left lower leg: No edema.      Comments: Unable to move left lower extremity    Skin:      General: Skin is warm.      Capillary Refill: Capillary refill takes less than 2 seconds.   Neurological:      Mental Status: She is alert.      Comments: A&Ox0           Significant Labs: All pertinent labs within the past 24 hours have been reviewed.    Significant Imaging: I have reviewed all pertinent imaging results/findings within the past 24 hours.    Assessment/Plan:     * Closed fracture of neck of left femur, non displaced   Presented after an unwitnessed fall.    Left CT hip showed a transcervical femoral neck fracture.    Plan:   Orthopedic surgery consult   Pain control   DVT prophylaxis   NPO after midnight   Preop labs.    CKD (chronic kidney disease) stage 4  At baseline.  Continue to monitor.      Late onset Alzheimer's disease without behavioral disturbance  No acute issues.      Hypothyroidism  Resume home meds      Hypertension  Resume home meds      Hyperlipidemia  Outpatient follow-up.        VTE Risk Mitigation (From admission, onward)         Ordered     heparin (porcine) injection 5,000 Units  Every 8 hours         04/09/23 1911     IP VTE HIGH RISK PATIENT  Once         04/09/23 1911     Place sequential compression device  Until discontinued         04/09/23 1911                 Silviano Garcia MD  Department of Hospital Medicine  [facility]

## 2023-04-10 NOTE — ASSESSMENT & PLAN NOTE
Presented after an unwitnessed fall.    Left CT hip showed a transcervical femoral neck fracture.    Plan:   Orthopedic surgery consult - to OR today  Pain control   DVT prophylaxis   Preop labs.

## 2023-04-10 NOTE — BRIEF OP NOTE
[facility] - Surgery  Brief Operative Note    SUMMARY     Surgery Date: 4/10/2023     Surgeon(s) and Role:     * Britt Garcia MD - Primary    Assisting Surgeon: None    Pre-op Diagnosis:  Closed fracture of left hip, initial encounter [S72.002A]    Post-op Diagnosis:  Post-Op Diagnosis Codes:     * Closed fracture of left hip, initial encounter [S72.002A]    Procedure(s) (LRB):  ORIF, HIP PINNING SYTHNES (Left)    Anesthesia: General    Operative Findings: Valgus impacted femoral neck fracture    Estimated Blood Loss: * No values recorded between 4/10/2023 12:41 PM and 4/10/2023  1:25 PM *    Estimated Blood Loss has been documented.         Specimens:   Specimen (24h ago, onward)      None            Post-Operative Instructions: WBAT LLE. PT/OT for mobilization.

## 2023-04-10 NOTE — HOSPITAL COURSE
84 y/o female admitted with hip fracture.  Ortho consulted and underwent ORIF on 4/10.  PT/OT consulted.  Recommending SNF.  SW/CM consulted.

## 2023-04-11 PROCEDURE — 97165 OT EVAL LOW COMPLEX 30 MIN: CPT

## 2023-04-11 PROCEDURE — 97530 THERAPEUTIC ACTIVITIES: CPT

## 2023-04-11 PROCEDURE — 63600175 PHARM REV CODE 636 W HCPCS: Performed by: STUDENT IN AN ORGANIZED HEALTH CARE EDUCATION/TRAINING PROGRAM

## 2023-04-11 PROCEDURE — 97535 SELF CARE MNGMENT TRAINING: CPT

## 2023-04-11 PROCEDURE — 25000003 PHARM REV CODE 250: Performed by: STUDENT IN AN ORGANIZED HEALTH CARE EDUCATION/TRAINING PROGRAM

## 2023-04-11 PROCEDURE — 11000001 HC ACUTE MED/SURG PRIVATE ROOM

## 2023-04-11 PROCEDURE — 97162 PT EVAL MOD COMPLEX 30 MIN: CPT

## 2023-04-11 RX ADMIN — LEVOTHYROXINE SODIUM 75 MCG: 75 TABLET ORAL at 07:04

## 2023-04-11 RX ADMIN — HEPARIN SODIUM 5000 UNITS: 5000 INJECTION INTRAVENOUS; SUBCUTANEOUS at 07:04

## 2023-04-11 RX ADMIN — POLYETHYLENE GLYCOL 3350 17 G: 17 POWDER, FOR SOLUTION ORAL at 09:04

## 2023-04-11 RX ADMIN — LISINOPRIL 20 MG: 20 TABLET ORAL at 09:04

## 2023-04-11 RX ADMIN — Medication 324 MG: at 07:04

## 2023-04-11 RX ADMIN — PANTOPRAZOLE SODIUM 40 MG: 40 TABLET, DELAYED RELEASE ORAL at 09:04

## 2023-04-11 RX ADMIN — HEPARIN SODIUM 5000 UNITS: 5000 INJECTION INTRAVENOUS; SUBCUTANEOUS at 09:04

## 2023-04-11 RX ADMIN — CLONIDINE HYDROCHLORIDE 0.1 MG: 0.1 TABLET ORAL at 09:04

## 2023-04-11 RX ADMIN — Medication 324 MG: at 04:04

## 2023-04-11 RX ADMIN — HEPARIN SODIUM 5000 UNITS: 5000 INJECTION INTRAVENOUS; SUBCUTANEOUS at 01:04

## 2023-04-11 RX ADMIN — HYDROCODONE BITARTRATE AND ACETAMINOPHEN 1 TABLET: 5; 325 TABLET ORAL at 01:04

## 2023-04-11 NOTE — PLAN OF CARE
[place] - Med Surg  Initial Discharge Assessment     Patient arrived from:  [facility] (Memory Care Unit)    Discharge plan at this time: Family interested in SNF for patient. Only wants EB facilities. Family given options...says patient can't be at home right now.      Barriers to DC:  Patient has Alzheimer's/Dementia which will be a barrier to DC.     Primary Care Provider: Tayo Varela MD    Admission Diagnosis: Hip fracture [S72.009A]    Admission Date: 4/9/2023  Expected Discharge Date:     Discharge Barriers Identified: None    Payor: MEDICARE / Plan: MEDICARE PART A & B / Product Type: Government /     Extended Emergency Contact Information  Primary Emergency Contact: Narendra Harris  Address: [address] [place] [address] [place] [address] [place]  Mobile Phone: [phone]  Relation: Son  Secondary Emergency Contact: Radha Long  Address: [address] [place] [address] [facility]  Home Phone: [phone]  Mobile Phone: [phone]  Relation: Sister    Discharge Plan A: Skilled Nursing Facility  Discharge Plan B: Home Health      [id number] [place] - [address] [facility]  [address] [facility] [address]  Phone: [phone] Fax: [phone]    [facility] [place], [place] - [address]  [address] [place] [address]  Phone: [phone] Fax: [phone]    [id number] [address] [facility] [address]  [address] [place] [address]  Phone: [phone] Fax: [phone]      Initial Assessment (most recent)       Adult Discharge Assessment - 04/11/23 0916          Discharge Assessment    Assessment Type Discharge Planning Assessment     Confirmed/corrected address, phone number and insurance Yes     Confirmed Demographics Correct on Facesheet     Source of Information family     Communicated ARNEL with patient/caregiver  Date not available/Unable to determine     Reason For Admission Closed Fracture of Neck of Left Femur     People in Home alone     Facility [facility] From: [facility])     Do you expect to return to your current living situation? Yes     Do you have help at home or someone to help you manage your care at home? Yes     Who are your caregiver(s) and their phone number(s)? Jayant Harris Son   [phone]; [facility]     Equipment Currently Used at Home none     Readmission within 30 days? No     Patient currently being followed by outpatient case management? No     Do you currently have service(s) that help you manage your care at home? No     Do you take prescription medications? Yes     Do you have prescription coverage? Yes     Coverage Medic[employer] B     Do you have any problems affording any of your prescribed medications? No     Is the patient taking medications as prescribed? yes     Who is going to help you get home at discharge? Jayant Harris Son   [phone]     Are you on dialysis? No     Do you take coumadin? No     Discharge Plan A Skilled Nursing Facility     Discharge Plan B Home Health     DME Needed Upon Discharge  walker, rolling     Discharge Plan discussed with: Adult children     Discharge Barriers Identified None

## 2023-04-11 NOTE — PROGRESS NOTES
[facility] Medicine  Progress Note    Patient Name: Daniela Harris  MRN: [id number]  Patient Class: IP- Inpatient   Admission Date: 4/9/2023  Length of Stay: 2 days  Attending Physician: Kenji Deras MD  Primary Care Provider: Tayo Varela MD        Subjective:     Principal Problem:Closed fracture of neck of left femur        HPI:  This is an 83-year-old female with a past medical history of Alzheimer's dementia, hypertension, hyperlipidemia, CKD 4, hypothyroidism, PUD, who presents after a fall.    Patient had an unwitnessed fall the morning of presentation while she was at her nursing home.  Per the patient's son, Jayant, she was left sitting on the edge of the bed and later fell down to the floor, unknown if she hit her head.  She uses a walker to ambulate at baseline.  Patient has a history of dementia and is unable to contribute to the history.    In the ED, the patient was hypertensive.  Labs remarkable for leukocytosis (13.4), normocytic anemia (10.7-baseline 11-12), elevated creatinine (1.6-around baseline).  UA showed +3 leukocytes, > 100 WBCs, and many bacteria.  CT head/cervical spine showed no acute process.  Left CT hip showed a transcervical femoral neck fracture.  The patient was admitted for further management.      Overview/Hospital Course:  82 y/o female admitted with hip fracture.  Ortho consulted and underwent ORIF on 4/10.      Interval History: looking comfortable.    Review of Systems   Unable to perform ROS: Dementia   Objective:     Vital Signs (Most Recent):  Temp: 97.6 °F (36.4 °C) (04/11/23 1127)  Pulse: 63 (04/11/23 1127)  Resp: 18 (04/11/23 1317)  BP: (!) 115/51 (04/11/23 1127)  SpO2: 96 % (04/11/23 1127)   Vital Signs (24h Range):  Temp:  [97.6 °F (36.4 °C)-98.7 °F (37.1 °C)] 97.6 °F (36.4 °C)  Pulse:  [61-97] 63  Resp:  [16-20] 18  SpO2:  [96 %-100 %] 96 %  BP: (115-157)/(51-67) 115/51     Weight: 80.7 kg (177 lb 14.6 oz)  Body mass index is 25.53  kg/m².    Intake/Output Summary (Last 24 hours) at 4/11/2023 1633  Last data filed at 4/11/2023 1230  Gross per 24 hour   Intake 240 ml   Output --   Net 240 ml      Physical Exam  Constitutional:       General: She is not in acute distress.     Appearance: She is not toxic-appearing or diaphoretic.   HENT:      Mouth/Throat:      Pharynx: Oropharynx is clear. No oropharyngeal exudate or posterior oropharyngeal erythema.   Cardiovascular:      Rate and Rhythm: Normal rate and regular rhythm.      Heart sounds: No murmur heard.    No gallop.   Pulmonary:      Effort: Pulmonary effort is normal. No respiratory distress.      Breath sounds: Normal breath sounds. No wheezing.   Abdominal:      General: Bowel sounds are normal. There is no distension.      Palpations: Abdomen is soft.      Tenderness: There is no abdominal tenderness.   Neurological:      Comments: Awake and alert.  Disoriented        Significant Labs: All pertinent labs within the past 24 hours have been reviewed.  BMP:   Recent Labs   Lab 04/10/23  0613   GLU 99      K 4.4   *   CO2 20*   BUN 33*   CREATININE 1.6*   CALCIUM 8.5*   MG 2.0     CBC:   Recent Labs   Lab 04/10/23  0613   WBC 10.75   HGB 10.1*   HCT 32.0*          Significant Imaging: I have reviewed all pertinent imaging results/findings within the past 24 hours.      Assessment/Plan:      * Closed fracture of neck of left femur, non displaced   Presented after an unwitnessed fall.    Left CT hip showed a transcervical femoral neck fracture.  Plan:   Orthopedic surgery consulted.  S/P ORIF on 4/10  Pain control   DVT prophylaxis   PT/OT consult.  Probably need SNF.    CKD (chronic kidney disease) stage 4  At baseline.  Continue to monitor.      Late onset Alzheimer's disease without behavioral disturbance  No acute issues.      Hypothyroidism  Continue synthroid.      Hypertension  Resume home meds      Hyperlipidemia  Outpatient follow-up.        VTE Risk Mitigation  (From admission, onward)         Ordered     heparin (porcine) injection 5,000 Units  Every 8 hours         04/09/23 1911     IP VTE HIGH RISK PATIENT  Once         04/09/23 1911     Place sequential compression device  Until discontinued         04/09/23 1911                Discharge Planning   ARNEL:      Code Status: Full Code   Is the patient medically ready for discharge?:     Reason for patient still in hospital (select all that apply): Patient trending condition  Discharge Plan A: Skilled Nursing Facility                  Kenji Deras MD  Department of Hospital Medicine   [facility]

## 2023-04-11 NOTE — NURSING
Patient returned to unit via bed with transport. Patient awake and alert. RR even and unlabored on 2L NC. VSS. No acute distress noted at this time. Patient son at bedside. Left hip CDI.  Will continue to monitor.

## 2023-04-11 NOTE — SUBJECTIVE & OBJECTIVE
Interval History: looking comfortable.    Review of Systems   Unable to perform ROS: Dementia   Objective:     Vital Signs (Most Recent):  Temp: 97.6 °F (36.4 °C) (04/11/23 1127)  Pulse: 63 (04/11/23 1127)  Resp: 18 (04/11/23 1317)  BP: (!) 115/51 (04/11/23 1127)  SpO2: 96 % (04/11/23 1127)   Vital Signs (24h Range):  Temp:  [97.6 °F (36.4 °C)-98.7 °F (37.1 °C)] 97.6 °F (36.4 °C)  Pulse:  [61-97] 63  Resp:  [16-20] 18  SpO2:  [96 %-100 %] 96 %  BP: (115-157)/(51-67) 115/51     Weight: 80.7 kg (177 lb 14.6 oz)  Body mass index is 25.53 kg/m².    Intake/Output Summary (Last 24 hours) at 4/11/2023 1633  Last data filed at 4/11/2023 1230  Gross per 24 hour   Intake 240 ml   Output --   Net 240 ml      Physical Exam  Constitutional:       General: She is not in acute distress.     Appearance: She is not toxic-appearing or diaphoretic.   HENT:      Mouth/Throat:      Pharynx: Oropharynx is clear. No oropharyngeal exudate or posterior oropharyngeal erythema.   Cardiovascular:      Rate and Rhythm: Normal rate and regular rhythm.      Heart sounds: No murmur heard.    No gallop.   Pulmonary:      Effort: Pulmonary effort is normal. No respiratory distress.      Breath sounds: Normal breath sounds. No wheezing.   Abdominal:      General: Bowel sounds are normal. There is no distension.      Palpations: Abdomen is soft.      Tenderness: There is no abdominal tenderness.   Neurological:      Comments: Awake and alert.  Disoriented        Significant Labs: All pertinent labs within the past 24 hours have been reviewed.  BMP:   Recent Labs   Lab 04/10/23  0613   GLU 99      K 4.4   *   CO2 20*   BUN 33*   CREATININE 1.6*   CALCIUM 8.5*   MG 2.0     CBC:   Recent Labs   Lab 04/10/23  0613   WBC 10.75   HGB 10.1*   HCT 32.0*          Significant Imaging: I have reviewed all pertinent imaging results/findings within the past 24 hours.

## 2023-04-11 NOTE — PLAN OF CARE
Problem: Physical Therapy  Goal: Physical Therapy Goal  Description: Goals to be met by: 23     Patient will increase functional independence with mobility by performin. Supine to sit with Moderate Assistance  2. Rolling to Left and Right with Moderate Assistance  3. Sit to stand transfer with Moderate Assistance using RW  4. Bed to chair transfer with Moderate Assistance   5. Gait ~20-30 feet with Moderate Assistance using Rolling Walker   6. Wheelchair propulsion ~ feet with Minimal Assistance using bilateral upper extremities  7. Lower extremity exercise program x10 reps per handout, with assistance as needed    Outcome: Ongoing, Progressing     Pt was dep x2 persons to sit EOB.

## 2023-04-11 NOTE — PT/OT/SLP EVAL
Physical Therapy Evaluation    Patient Name:  Daniela Harris   MRN:  [id number]    Recommendations:     Discharge Recommendations: nursing facility, skilled   Discharge Equipment Recommendations:  (TBD)   Barriers to discharge home:  Pt with decreased functional mobility and ambulation at this time.    Assessment:     Daniela Harris is a 83 y.o. female admitted with a medical diagnosis of Closed fracture of neck of left femur.  She presents with the following impairments/functional limitations: impaired self care skills, impaired functional mobility, gait instability, impaired balance, impaired cognition, decreased coordination, decreased upper extremity function, decreased lower extremity function, decreased safety awareness, pain, decreased ROM, impaired fine motor, impaired skin, edema.    Rehab Prognosis: Fair; patient would benefit from acute skilled PT services to address these deficits and reach maximum level of function.    Recent Surgery: Procedure(s) (LRB):  ORIF, HIP PINNING SYTHNES (Left) 1 Day Post-Op    Plan:     During this hospitalization, patient to be seen daily to address the identified rehab impairments via gait training, therapeutic activities, therapeutic exercises, wheelchair management/training and progress toward the following goals:    Plan of Care Expires:  04/25/23    Subjective     Chief Complaint: Pt without any complaints.  Pt appeared comfortable at rest, however has pain during functional mobility.    Patient/Family Comments/goals: Pt's son agreeable to therapy, however he must stepped out because he does not want to see his mother in pain.   Pain/Comfort:  Pain Rating 1:  (Pt with LLE pain.)  Pain Addressed 1: Pre-medicate for activity, Reposition, Distraction, Cessation of Activity      Living Environment:  Pt lives at an assisted living in [place].    Prior to admission, patients level of function was ambulatory using a RW with assistance.  Equipment at home: walker, rolling,  wheelchair.  Upon discharge, patient will have assistance from family.    Objective:     Communicated with nurse Deanna prior to session.  Patient found left sidelying with bed alarm, peripheral IV, SCD, pressure relief boots, PureWick ([employer] monitor)  upon PT entry to room.    General Precautions: Standard, fall  Orthopedic Precautions:LLE weight bearing as tolerated   Braces: N/A  Respiratory Status: Room air    Exams:  Cognitive Exam:  Patient is oriented to Person only.  Pt was able follow a few simple commands.   Gross Motor Coordination:  impaired  Postural Exam:  Patient presented with the following abnormalities:    -       No postural abnormalities identified  Skin Integrity/Edema:      -       Skin integrity: L hip dressings intact with min bloody drainage  -       Edema: Mild LLE  BLE ROM: PROM WFL  BLE Strength: unable to formally assess 2* pt unable to follow commands to participate in exam    Functional Mobility:  Pt pleasantly confused, unable to answer questions.  Pt oriented to self and was only able to state her first name.  Pt with increased pain as assessment continued, displaying some resistance during PROM and functional mobility.    Bed Mobility:     Rolling Left:  dependence and of 2 persons  Rolling Right: dependence and of 2 persons  Scooting: dependence and of 2 persons for anterior scooting  Bridging: dependence and of 2 persons to reposition HOB with bed in trendelenburg  Supine to Sit: dependence and of 2 persons with HOB elevated   Sit to Supine: dependence and of 2 persons  Transfers:     Sit to Stand:  dependence and of 2 persons with rolling walker x2 trials   Gait: Pt able to take ~2 steps (voluntarily, not on commands) by bedside with max A of 2 persons using RW.  Pt unable to take any steps during 2nd trial.  Pt with decreased weight shifting, foot clearance, and step length.    Balance: Pt with fair static sit balance.       AM-PAC 6 CLICK MOBILITY  Total  Score:11       Treatment & Education:  PROM to BLE in all available planes and as tolerated.  Pt with some resistance, more so on nonsurgical leg.  Pt unable to participate actively in LE therex despite max VC's/TC's and demonstration.    Balance Training  Static Standing:  Patient performed static standing on level surface  using rolling walker with Maximal Assistance of 2 persons and maximal verbal cues for standing tolerance x2 trials.     Pt educated on acute skilled PT services, will need reinforcement.  Son stepped out during PT eval.    Patient left right sidelying, HOB @30*, LUE elevated on pillow, and pillow between LE with all lines intact, call button in reach, bed alarm on, nurse Deanna notified, and [employer] monitor present.  SCD/B pressure relief boots reapplied.      GOALS:   Multidisciplinary Problems       Physical Therapy Goals          Problem: Physical Therapy    Goal Priority Disciplines Outcome Goal Variances Interventions   Physical Therapy Goal     PT, PT/OT Ongoing, Progressing     Description: Goals to be met by: 23     Patient will increase functional independence with mobility by performin. Supine to sit with Moderate Assistance  2. Rolling to Left and Right with Moderate Assistance  3. Sit to stand transfer with Moderate Assistance using RW  4. Bed to chair transfer with Moderate Assistance   5. Gait ~20-30 feet with Moderate Assistance using Rolling Walker   6. Wheelchair propulsion ~ feet with Minimal Assistance using bilateral upper extremities  7. Lower extremity exercise program x10 reps per handout, with assistance as needed                         History:     Past Medical History:   Diagnosis Date    After-cataract, obscuring vision - Left Eye 2014    Arthritis     Diverticulitis     Goiter     MNG    History of appendectomy     Hyperlipidemia     following diet    Hypothyroidism     s/p surgery    Osteopenia     Other and unspecified hyperlipidemia      Peptic ulcer     x 2    Psoriasis     no active illness for years    Stroke     Unspecified essential hypertension     Unspecified vitamin D deficiency     Vitamin B 12 deficiency        Past Surgical History:   Procedure Laterality Date    APPENDECTOMY      CATARACT EXTRACTION      ou    CATARACT EXTRACTION W/ INTRAOCULAR LENS  IMPLANT, BILATERAL      COLONOSCOPY      ESOPHAGOGASTRODUODENOSCOPY      THYROID SURGERY      Total    TONSILLECTOMY      TONSILLECTOMY, ADENOIDECTOMY      TUBAL LIGATION         Time Tracking:     PT Received On: 04/11/23  PT Start Time: 1335     PT Stop Time: 1358  PT Total Time (min): 23 min     Billable Minutes: Evaluation 15 min co-eval with OT and Therapeutic Activity 8 min      04/11/2023

## 2023-04-11 NOTE — PLAN OF CARE
Problem: Occupational Therapy  Goal: Occupational Therapy Goal  Description: Goals to be met by: 04/25/23     Patient will increase functional independence with ADLs by performing:    Feeding with Minimal Assistance (finger foods).  Patient will stand statically for 2 min with Minimal assistance to assist with toileting for hygiene and clothing management.   Sitting at edge of bed x15 minutes with Supervision.  Supine to sit with Minimal Assistance.  Step transfer with Minimal Assistance  Toilet transfer to bedside commode with Minimal Assistance.  Upper extremity exercise program x15 reps per handout, with assistance as needed.    Outcome: Ongoing, Progressing     Dependent x2 bed mobility. Pt took a couple sidesteps at EOB using RW with MAX A x2. OT rec SNF at d/c in order to increase safety and independence with ADLs and all aspects of functional mobility.

## 2023-04-11 NOTE — NURSING
[facility] [place] 4 [place]      4/11/2023       Skin assessed on: Q Shift      [x] No Pressure Injuries Present    [x]Prevention Measures Documented    [] Yes LDA  for Pressure Injury Previously documented     [] Yes New Pressure Injury Discovered   [] LDA for New Pressure Injury Added      Attending RN:  Regina Henry RN     Second RN:  Eran Yu RN

## 2023-04-11 NOTE — ASSESSMENT & PLAN NOTE
Presented after an unwitnessed fall.    Left CT hip showed a transcervical femoral neck fracture.  Plan:   Orthopedic surgery consulted.  S/P ORIF on 4/10  Pain control   DVT prophylaxis   PT/OT consult.  Probably need SNF.

## 2023-04-11 NOTE — ANESTHESIA POSTPROCEDURE EVALUATION
Anesthesia Post Evaluation    Patient: Daniela Harris    Procedure(s) Performed: Procedure(s) (LRB):  ORIF, HIP PINNING SYTHNES (Left)    Final Anesthesia Type: general      Patient location during evaluation: PACU  Patient participation: Yes- Able to Participate  Level of consciousness: awake and alert, oriented and awake  Post-procedure vital signs: reviewed and stable  Airway patency: patent    PONV status at discharge: No PONV  Anesthetic complications: no      Cardiovascular status: blood pressure returned to baseline  Respiratory status: unassisted, spontaneous ventilation and room air  Hydration status: euvolemic  Follow-up not needed.          Vitals Value Taken Time   /55 04/11/23 0348   Temp 36.7 °C (98.1 °F) 04/11/23 0348   Pulse 67 04/11/23 0348   Resp 20 04/11/23 0348   SpO2 100 % 04/11/23 0348         Event Time   Out of Recovery 04/10/2023 16:22:00         Pain/Kartik Score: Kartik Score: 9 (4/10/2023  3:00 PM)

## 2023-04-11 NOTE — PROGRESS NOTES
Orthopedic Surgery Progress Note    Subjective:  No acute issues. Son reports patient seems comfortable, has had a good appetite.     Objective:  Vital signs in last 24 hours:  Temp:  [97 °F (36.1 °C)-98.7 °F (37.1 °C)] 97.6 °F (36.4 °C)  Pulse:  [61-97] 63  Resp:  [15-32] 17  SpO2:  [96 %-100 %] 96 %  BP: (105-157)/(51-96) 115/51    Physical Exam:   NAD, dementia, does not respond to questioning, nonlabored respirations.   Mild bloody drainage on L hip bandage. No significant swelling. Does not react in much pain. Toes warm and well perfused.     Data Review  CBC:   Lab Results   Component Value Date    WBC 10.75 04/10/2023    RBC 3.39 (L) 04/10/2023    HGB 10.1 (L) 04/10/2023    HCT 32.0 (L) 04/10/2023     04/10/2023       Assessment/Plan: s/p L hip ORIF  - WBAT LLE. PT/OT.   - Change dressings today.   - Prefer SNF placement - social work consulted.     Britt Garcia MD  Orthopedics  [facility]

## 2023-04-11 NOTE — PLAN OF CARE
Problem: Adult Inpatient Plan of Care  Goal: Plan of Care Review  Outcome: Ongoing, Progressing  Flowsheets (Taken 4/11/2023 1556)  Plan of Care Reviewed With: patient  Goal: Patient-Specific Goal (Individualized)  Outcome: Ongoing, Progressing  Goal: Absence of Hospital-Acquired Illness or Injury  Outcome: Ongoing, Progressing  Intervention: Identify and Manage Fall Risk  Flowsheets (Taken 4/11/2023 1556)  Safety Promotion/Fall Prevention:   Fall Risk reviewed with patient/family   assistive device/personal item within reach   Fall Risk signage in place   nonskid shoes/socks when out of bed   high risk medications identified   side rails raised x 2   room near unit station   instructed to call staff for mobility   medications reviewed  Intervention: Prevent Skin Injury  Flowsheets (Taken 4/11/2023 1556)  Body Position:   turned   weight shifting  Skin Protection:   adhesive use limited   tubing/devices free from skin contact   silicone foam dressing in place  Intervention: Prevent and Manage VTE (Venous Thromboembolism) Risk  Flowsheets (Taken 4/11/2023 1556)  Activity Management: Rolling - L1  VTE Prevention/Management:   ambulation promoted   bleeding precations maintained   bleeding risk assessed  Range of Motion:   active ROM (range of motion) encouraged   ROM (range of motion) performed  Intervention: Prevent Infection  Flowsheets (Taken 4/11/2023 1556)  Infection Prevention: hand hygiene promoted  Goal: Optimal Comfort and Wellbeing  Outcome: Ongoing, Progressing  Goal: Readiness for Transition of Care  Outcome: Ongoing, Progressing     Problem: Fall Injury Risk  Goal: Absence of Fall and Fall-Related Injury  Outcome: Ongoing, Progressing  Intervention: Identify and Manage Contributors  Flowsheets (Taken 4/11/2023 1556)  Self-Care Promotion:   independence encouraged   safe use of adaptive equipment encouraged   BADL personal objects within reach   meal set-up provided   BADL personal routines  maintained  Medication Review/Management:   medications reviewed   high-risk medications identified  Intervention: Promote Injury-Free Environment  Flowsheets (Taken 4/11/2023 1646)  Safety Promotion/Fall Prevention:   Fall Risk reviewed with patient/family   assistive device/personal item within reach   Fall Risk signage in place   nonskid shoes/socks when out of bed   high risk medications identified   side rails raised x 2   room near unit station   instructed to call staff for mobility   medications reviewed     Problem: Skin Injury Risk Increased  Goal: Skin Health and Integrity  Outcome: Ongoing, Progressing  Intervention: Optimize Skin Protection  Flowsheets (Taken 4/11/2023 4346)  Pressure Reduction Devices:   foam padding utilized   pressure-redistributing mattress utilized   heel offloading device utilized  Skin Protection:   adhesive use limited   tubing/devices free from skin contact   silicone foam dressing in place  Head of Bed (HOB) Positioning: HOB at 30-45 degrees   Pt alert able to make needs known,mary meds well,no s/s adverse reaction noted,reposition q 2hrs,pain controlled by prn pain medication, dressing ,intact,with drainage, changed per order no s/s infection noted,POC explained,remains free from falls and pressure injuries,safety maintained,continue monitoring.    stretcher

## 2023-04-11 NOTE — PT/OT/SLP EVAL
Occupational Therapy Evaluation and Treatment    Name: Daniela Harris  MRN: [id number]  Admitting Diagnosis: Closed fracture of neck of left femur 1 Day Post-Op  Recent Surgery: Procedure(s) (LRB):  ORIF, HIP PINNING SYTHNES (Left) 1 Day Post-Op    Recommendations:     Discharge Recommendations: nursing facility, skilled  Level of Assistance Recommended: 24 hours significant assistance  Discharge Equipment Recommendations:  (TBD)  Barriers to discharge:  (s/p fall with fracture; dependent x2 for functional transfer)    Assessment:     Daniela Harris is a 83 y.o. female with a medical diagnosis of Closed fracture of neck of left femur. She presents with performance deficits affecting function including weakness, impaired cognition, impaired endurance, decreased ROM, orthopedic precautions, impaired self care skills, decreased upper extremity function, decreased lower extremity function, impaired fine motor, impaired functional mobility, impaired skin, decreased coordination, decreased safety awareness, impaired balance, pain, gait instability, edema.     Rehab Prognosis:  Fair/+ ; patient would benefit from acute OT services to address these deficits and reach maximum level of function.    Plan:     Patient to be seen  (5-6x/week) to address the above listed problems via self-care/home management, therapeutic activities, therapeutic exercises  Plan of Care Expires: 04/25/23  Plan of Care Reviewed with: patient    Subjective     Chief Complaint: pt grimacing in pain with movement   Patient Comments/Goals: son stepped out for therapy session; pt only able to verbalize her first name and some confused speech     Pain/Comfort:  Pain Rating 1:  (pt unable to report pain, but grimacing in pain with movement)  Pain Addressed 1: Pre-medicate for activity, Reposition, Distraction, Cessation of Activity, Nurse notified    Patients cultural, spiritual, Gnosticism conflicts given the current situation: no    Social History: son  clarified pt's PLOF.  Living Environment: Patient lives at [facility] memory care unit.   Prior Level of Function: Pt has been living at [facility] since ~2019. Pt now requires total A with dressing, bathing, toileting, and grooming. Pt is able to feed herself finger foods. Pt requires assist with bed mobility; pt ambulates using RW within room and to the dining oviedo. Pt primarily just says her name and confused speech. Pt is a devout [employer]; pt has baby dolls that she enjoys caring for.   Equipment Used at Home: walker, rolling, wheelchair  Assistance Upon Discharge:  son; pt's son reports that pt is unable to return to assisted living like this    Objective:     Communicated with nurse, Deanna, prior to session. Patient found  HOB elevated L sidelying with pillow in-between BLE  with bed alarm, peripheral IV, pressure relief boots, PureWick (avasys) upon OT entry to room.    General Precautions: Standard, fall   Orthopedic Precautions: LLE weight bearing as tolerated   Braces: N/A    Respiratory Status: Room air    Occupational Performance    Gait belt applied - Yes    Bed Mobility:   Rolling/Turning to Left with dependence  Rolling/Turning to Right with dependence  Scooting to HOB in supine: dependence, of 2 persons, and bed in trendelenburg position  Scooting anteriorly to EOB to have both feet planted on floor: dependence  Supine to sit from left side of bed with dependence, of 2 persons, and HOB elevated  Sit to Supine with dependence and of 2 persons on left side of bed    Functional Mobility/Transfers:  Sit <> Stand Transfer with maximal assistance and 2 persons with rolling walkerx 2 trials   Functional Mobility: pt required assistance for BUE placement on the RW and BLE placement prior to sit>stand. Pt stood with dependent x2 assist with impaired initiation; however, once standing, pt able to take a couple sidesteps to the L with MAX A x2 on first trial. On second trial of standing, pt appeared  in more pain and unable to take any steps.     Activities of Daily Living:  Grooming: pt sat EOB unsupported with SBA. Pt easily distracted by bed linens/environment. With short simple cue to wash face, pt unable to. Pt with difficulty following hand-over-hand assist, requiring total A to wash face.   Lower Body Dressing: total assistance to adjust B/L socks    Cognitive/Visual Perceptual:  Cognitive/Psychosocial Skills:    -     Oriented to: first name  -     Follows Commands/attention: followed few one-step commands  -     Communication: pt able say her first name clearly   -     Memory: Impaired STM, Impaired LTM, and Poor immediate recall  -     Safety awareness/insight to disability: impaired  -     Mood/Affect/Coping skills/emotional control: pleasantly confused   Visual/Perceptual:    -     unable to assess 2* cognition; per chart, pt with L eye visual impairments with h/o of cataracts     Physical Exam:  Balance:    -     Sitting: stand by assistance  -     Standing: dependence, of 2 persons, and RW  Postural examination/scapula alignment:    -       Rounded shoulders  -       Forward head  Skin integrity: minimal drainage to L hip dressing  Edema:  Mild L elbow   Sensation: unknown; pt unable to report  Upper Extremity Range of Motion:     -       R/L Upper Extremity:  pt difficulty following commands for ROM assessment, but PROM WFL  Upper Extremity Strength:    -       R/L Upper Extremity:  pt unable to follow commands for MMT assessment; pt demo'd resistance with movement and tensed up with extremities d/t pain   Strength:    -       R/L Upper Extremity: WFL  Fine Motor Coordination:    -       Impaired  Left hand, manipulation of objects   and Right hand, manipulation of objects    Gross motor coordination:   impaired    AMPAC 6 Click ADL:  AMPAC Total Score: 9    Treatment & Education:  Pt educated on OT role/POC. Pt re-oriented to place and situation.   Importance of OOB activity with therapy  assistance.  Safety during functional t/f and mobility   Self-care tasks/functional mobility completed- assistance level noted above   Educated on the importance of mobility to maximize recovery  Second visit:   Edu son on OT role and POC. Edu on importance of use of wedge, pressure relief boots, and reasoning of turning every 2 hours. Edu on level 1 progressive mobility with nursing care.   Edu son if able, to bring items that are familiar to her, like her baby dolls, in order to incorporate in sessions to increase pt's engagement.   Educated on the importance of OOB mobility with therapy within safe range in order to decrease adverse effects of prolonged bedrest    Patient not clear to transfer with RN/PCT at this current time; pt is a level 1 progressive mobility.    Patient left  HOB elevated R sidelying with RUE elevated on pillow, pillow in-between BLE, B/L SCDs, and B/L pressure relief boots in place  with all lines intact, call button in reach, bed alarm on, and nurse, Leta, and CNA, Jose, notified. Door left open (across from nurses station). Lights on, blinds open, and tv on .    GOALS:   Multidisciplinary Problems       Occupational Therapy Goals          Problem: Occupational Therapy    Goal Priority Disciplines Outcome Interventions   Occupational Therapy Goal     OT, PT/OT Ongoing, Progressing    Description: Goals to be met by: 04/25/23     Patient will increase functional independence with ADLs by performing:    Feeding with Minimal Assistance (finger foods).  Patient will stand statically for 2 min with Minimal assistance to assist with toileting for hygiene and clothing management.   Sitting at edge of bed x15 minutes with Supervision.  Supine to sit with Minimal Assistance.  Step transfer with Minimal Assistance  Toilet transfer to bedside commode with Minimal Assistance.  Upper extremity exercise program x15 reps per handout, with assistance as needed.                         History:     Past  Medical History:   Diagnosis Date    After-cataract, obscuring vision - Left Eye 2/28/2014    Arthritis     Diverticulitis     Goiter     MNG    History of appendectomy     Hyperlipidemia     following diet    Hypothyroidism     s/p surgery    Osteopenia     Other and unspecified hyperlipidemia     Peptic ulcer     x 2    Psoriasis     no active illness for years    Stroke     Unspecified essential hypertension     Unspecified vitamin D deficiency     Vitamin B 12 deficiency          Past Surgical History:   Procedure Laterality Date    APPENDECTOMY      CATARACT EXTRACTION      ou    CATARACT EXTRACTION W/ INTRAOCULAR LENS  IMPLANT, BILATERAL      COLONOSCOPY      ESOPHAGOGASTRODUODENOSCOPY      THYROID SURGERY      Total    TONSILLECTOMY      TONSILLECTOMY, ADENOIDECTOMY      TUBAL LIGATION         Time Tracking:     OT Date of Treatment: 04/11/23  OT Start Time: 1334  OT Stop Time: 1357  OT Total Time (min): 23 min    Second visit: 1444- 1459: 15 min    Billable Minutes: Evaluation 15 min, Self Care/Home Management 8 min (co-eval with PT); 15 min therapeutic activity     4/11/2023

## 2023-04-11 NOTE — NURSING
Patient resting in bed, respirations e/u, no family at bedside. Pt noted to have oozing blood from her surgical site on left hip. Compression dressing placed. CLWR, BR upx3, bed low, locked in position. Safety maintained. Bed alarm engaged, will continue to monitor. Q2 turns.

## 2023-04-11 NOTE — PLAN OF CARE
Problem: Adult Inpatient Plan of Care  Goal: Plan of Care Review  Outcome: Ongoing, Progressing  Goal: Absence of Hospital-Acquired Illness or Injury  Outcome: Ongoing, Progressing     Problem: Fall Injury Risk  Goal: Absence of Fall and Fall-Related Injury  Outcome: Ongoing, Progressing     Problem: Skin Injury Risk Increased  Goal: Skin Health and Integrity  Outcome: Ongoing, Progressing

## 2023-04-11 NOTE — NURSING
Report received from PACU nurse, Vivian Elam. Per report patient had Left ORIF, surgical dressing in place. Patient on 2L NC at this time. Awake, RR even and unlabored. VSS.

## 2023-04-12 PROCEDURE — 25000003 PHARM REV CODE 250: Performed by: STUDENT IN AN ORGANIZED HEALTH CARE EDUCATION/TRAINING PROGRAM

## 2023-04-12 PROCEDURE — 97530 THERAPEUTIC ACTIVITIES: CPT

## 2023-04-12 PROCEDURE — 63600175 PHARM REV CODE 636 W HCPCS: Performed by: STUDENT IN AN ORGANIZED HEALTH CARE EDUCATION/TRAINING PROGRAM

## 2023-04-12 PROCEDURE — 97110 THERAPEUTIC EXERCISES: CPT

## 2023-04-12 PROCEDURE — 25000003 PHARM REV CODE 250: Performed by: HOSPITALIST

## 2023-04-12 PROCEDURE — 97535 SELF CARE MNGMENT TRAINING: CPT

## 2023-04-12 PROCEDURE — 11000001 HC ACUTE MED/SURG PRIVATE ROOM

## 2023-04-12 RX ORDER — CLONIDINE HYDROCHLORIDE 0.1 MG/1
0.1 TABLET ORAL EVERY 8 HOURS PRN
Status: DISCONTINUED | OUTPATIENT
Start: 2023-04-12 | End: 2023-04-19 | Stop reason: HOSPADM

## 2023-04-12 RX ADMIN — HEPARIN SODIUM 5000 UNITS: 5000 INJECTION INTRAVENOUS; SUBCUTANEOUS at 05:04

## 2023-04-12 RX ADMIN — HYDROCODONE BITARTRATE AND ACETAMINOPHEN 1 TABLET: 5; 325 TABLET ORAL at 01:04

## 2023-04-12 RX ADMIN — CLONIDINE HYDROCHLORIDE 0.1 MG: 0.1 TABLET ORAL at 10:04

## 2023-04-12 RX ADMIN — HYDROCODONE BITARTRATE AND ACETAMINOPHEN 1 TABLET: 5; 325 TABLET ORAL at 10:04

## 2023-04-12 RX ADMIN — Medication 324 MG: at 04:04

## 2023-04-12 RX ADMIN — CLONIDINE HYDROCHLORIDE 0.1 MG: 0.1 TABLET ORAL at 01:04

## 2023-04-12 RX ADMIN — HYDROCODONE BITARTRATE AND ACETAMINOPHEN 1 TABLET: 5; 325 TABLET ORAL at 05:04

## 2023-04-12 RX ADMIN — MORPHINE SULFATE 3 MG: 4 INJECTION INTRAVENOUS at 10:04

## 2023-04-12 RX ADMIN — Medication 324 MG: at 08:04

## 2023-04-12 RX ADMIN — LEVOTHYROXINE SODIUM 75 MCG: 75 TABLET ORAL at 05:04

## 2023-04-12 RX ADMIN — HEPARIN SODIUM 5000 UNITS: 5000 INJECTION INTRAVENOUS; SUBCUTANEOUS at 10:04

## 2023-04-12 RX ADMIN — PANTOPRAZOLE SODIUM 40 MG: 40 TABLET, DELAYED RELEASE ORAL at 08:04

## 2023-04-12 RX ADMIN — LISINOPRIL 20 MG: 20 TABLET ORAL at 08:04

## 2023-04-12 RX ADMIN — HEPARIN SODIUM 5000 UNITS: 5000 INJECTION INTRAVENOUS; SUBCUTANEOUS at 01:04

## 2023-04-12 RX ADMIN — POLYETHYLENE GLYCOL 3350 17 G: 17 POWDER, FOR SOLUTION ORAL at 08:04

## 2023-04-12 RX ADMIN — Medication 6 MG: at 10:04

## 2023-04-12 NOTE — PROGRESS NOTES
[facility] Medicine  Progress Note    Patient Name: Daniela Harris  MRN: [id number]  Patient Class: IP- Inpatient   Admission Date: 4/9/2023  Length of Stay: 3 days  Attending Physician: Kenji Deras MD  Primary Care Provider: Tayo Varela MD        Subjective:     Principal Problem:Closed fracture of neck of left femur        HPI:  This is an 83-year-old female with a past medical history of Alzheimer's dementia, hypertension, hyperlipidemia, CKD 4, hypothyroidism, PUD, who presents after a fall.    Patient had an unwitnessed fall the morning of presentation while she was at her nursing home.  Per the patient's son, Jayant, she was left sitting on the edge of the bed and later fell down to the floor, unknown if she hit her head.  She uses a walker to ambulate at baseline.  Patient has a history of dementia and is unable to contribute to the history.    In the ED, the patient was hypertensive.  Labs remarkable for leukocytosis (13.4), normocytic anemia (10.7-baseline 11-12), elevated creatinine (1.6-around baseline).  UA showed +3 leukocytes, > 100 WBCs, and many bacteria.  CT head/cervical spine showed no acute process.  Left CT hip showed a transcervical femoral neck fracture.  The patient was admitted for further management.      Overview/Hospital Course:  84 y/o female admitted with hip fracture.  Ortho consulted and underwent ORIF on 4/10.  PT/OT consulted.  Recommending SNF.      Interval History: no issues overnight.    Review of Systems   Unable to perform ROS: Dementia   Objective:     Vital Signs (Most Recent):  Temp: 98.8 °F (37.1 °C) (04/12/23 1143)  Pulse: 106 (04/12/23 1143)  Resp: 18 (04/12/23 1143)  BP: (!) 184/79 (04/12/23 1143)  SpO2: 96 % (04/12/23 1143)   Vital Signs (24h Range):  Temp:  [98.4 °F (36.9 °C)-99.9 °F (37.7 °C)] 98.8 °F (37.1 °C)  Pulse:  [] 106  Resp:  [18-19] 18  SpO2:  [93 %-96 %] 96 %  BP: (117-184)/(58-79) 184/79     Weight: 81 kg (178 lb 9.2  oz)  Body mass index is 25.62 kg/m².    Intake/Output Summary (Last 24 hours) at 4/12/2023 1255  Last data filed at 4/12/2023 1219  Gross per 24 hour   Intake 120 ml   Output 1000 ml   Net -880 ml        Physical Exam  Constitutional:       General: She is not in acute distress.     Appearance: She is not toxic-appearing or diaphoretic.   HENT:      Mouth/Throat:      Pharynx: Oropharynx is clear. No oropharyngeal exudate or posterior oropharyngeal erythema.   Cardiovascular:      Rate and Rhythm: Normal rate and regular rhythm.      Heart sounds: No murmur heard.    No gallop.   Pulmonary:      Effort: Pulmonary effort is normal. No respiratory distress.      Breath sounds: Normal breath sounds. No wheezing.   Abdominal:      General: Bowel sounds are normal. There is no distension.      Palpations: Abdomen is soft.      Tenderness: There is no abdominal tenderness.   Neurological:      Comments: Awake and alert.  Disoriented        Significant Labs: All pertinent labs within the past 24 hours have been reviewed.  BMP:   No results for input(s): GLU, NA, K, CL, CO2, BUN, CREATININE, CALCIUM, MG in the last 48 hours.    CBC:   No results for input(s): WBC, HGB, HCT, PLT in the last 48 hours.      Significant Imaging: I have reviewed all pertinent imaging results/findings within the past 24 hours.      Assessment/Plan:      * Closed fracture of neck of left femur, non displaced   Presented after an unwitnessed fall.    Left CT hip showed a transcervical femoral neck fracture.  Plan:   Orthopedic surgery consulted.  S/P ORIF on 4/10  Pain control   DVT prophylaxis   PT/OT consult.  Recommending SNF.  SW consulted.    CKD (chronic kidney disease) stage 4  At baseline.  Continue to monitor.      Late onset Alzheimer's disease without behavioral disturbance  No acute issues.      Hypothyroidism  Continue synthroid.      Hypertension  Resume home meds      Hyperlipidemia  Outpatient follow-up.        VTE Risk Mitigation  (From admission, onward)         Ordered     heparin (porcine) injection 5,000 Units  Every 8 hours         04/09/23 1911     IP VTE HIGH RISK PATIENT  Once         04/09/23 1911     Place sequential compression device  Until discontinued         04/09/23 1911                Discharge Planning   ARNEL:      Code Status: Full Code   Is the patient medically ready for discharge?:     Reason for patient still in hospital (select all that apply): Pending disposition  Discharge Plan A: Skilled Nursing Facility   Discharge Delays: (!) Post-Acute Set-up              Kenji Deras MD  Department of Hospital Medicine   [facility]

## 2023-04-12 NOTE — NURSING
[facility] [place]urses [place] -- 4 Eyes      4/12/2023       Skin assessed on: Q Shift      [x] No Pressure Injuries Present    []Prevention Measures Documented    [] Yes LDA  for Pressure Injury Previously documented     [] Yes New Pressure Injury Discovered   [] LDA for New Pressure Injury Added      Attending RN:  Karla Roach RN     Second RN:  JASPREET Townsend

## 2023-04-12 NOTE — SUBJECTIVE & OBJECTIVE
Interval History: no issues overnight.    Review of Systems   Unable to perform ROS: Dementia   Objective:     Vital Signs (Most Recent):  Temp: 98.8 °F (37.1 °C) (04/12/23 1143)  Pulse: 106 (04/12/23 1143)  Resp: 18 (04/12/23 1143)  BP: (!) 184/79 (04/12/23 1143)  SpO2: 96 % (04/12/23 1143)   Vital Signs (24h Range):  Temp:  [98.4 °F (36.9 °C)-99.9 °F (37.7 °C)] 98.8 °F (37.1 °C)  Pulse:  [] 106  Resp:  [18-19] 18  SpO2:  [93 %-96 %] 96 %  BP: (117-184)/(58-79) 184/79     Weight: 81 kg (178 lb 9.2 oz)  Body mass index is 25.62 kg/m².    Intake/Output Summary (Last 24 hours) at 4/12/2023 1255  Last data filed at 4/12/2023 1219  Gross per 24 hour   Intake 120 ml   Output 1000 ml   Net -880 ml        Physical Exam  Constitutional:       General: She is not in acute distress.     Appearance: She is not toxic-appearing or diaphoretic.   HENT:      Mouth/Throat:      Pharynx: Oropharynx is clear. No oropharyngeal exudate or posterior oropharyngeal erythema.   Cardiovascular:      Rate and Rhythm: Normal rate and regular rhythm.      Heart sounds: No murmur heard.    No gallop.   Pulmonary:      Effort: Pulmonary effort is normal. No respiratory distress.      Breath sounds: Normal breath sounds. No wheezing.   Abdominal:      General: Bowel sounds are normal. There is no distension.      Palpations: Abdomen is soft.      Tenderness: There is no abdominal tenderness.   Neurological:      Comments: Awake and alert.  Disoriented        Significant Labs: All pertinent labs within the past 24 hours have been reviewed.  BMP:   No results for input(s): GLU, NA, K, CL, CO2, BUN, CREATININE, CALCIUM, MG in the last 48 hours.    CBC:   No results for input(s): WBC, HGB, HCT, PLT in the last 48 hours.      Significant Imaging: I have reviewed all pertinent imaging results/findings within the past 24 hours.

## 2023-04-12 NOTE — ASSESSMENT & PLAN NOTE
Presented after an unwitnessed fall.    Left CT hip showed a transcervical femoral neck fracture.  Plan:   Orthopedic surgery consulted.  S/P ORIF on 4/10  Pain control   DVT prophylaxis   PT/OT consult.  Recommending SNF.  SW consulted.

## 2023-04-12 NOTE — PLAN OF CARE
JEFF called Vickie to inquire if she received the list of SNF. Vickie said yes and stated that she and Jayant would prefer [facility], [facility], and [facility]. JEFF sent referrals.        04/12/23 7254   Discharge Reassessment   Assessment Type Discharge Planning Reassessment   Did the patient's condition or plan change since previous assessment? No   Discharge Plan discussed with: Adult children   Communicated ARNEL with patient/caregiver Date not available/Unable to determine   Discharge Plan A Skilled Nursing Facility   Discharge Plan B Home Health   Discharge Barriers Identified None   Why the patient remains in the hospital Requires continued medical care   Post-Acute Status   Post-Acute Authorization Placement  (SNF)   Post-Acute Placement Status Pending post-acute provider review/more information requested   Coverage Medicare AB   Discharge Delays (!) Post-Acute Set-up

## 2023-04-12 NOTE — PROGRESS NOTES
The patient is in bed.  She does not verbalize much.  She appears comfortable and agrees.     Temp:  [98.8 °F (37.1 °C)-99.9 °F (37.7 °C)] 98.9 °F (37.2 °C)  Pulse:  [] 93  Resp:  [18] 18  SpO2:  [92 %-96 %] 92 %  BP: (117-184)/(61-79) 155/63    Physical examination:    Elderly female in bed.  Left thigh dressing is clean and dry.  There is moderate swelling as expected.  Leg length was good.  Heels are in heel protectors.      No results for input(s): WBC, RBC, HGB, HCT, PLT, MCV, MCH, MCHC in the last 24 hours.      Current Facility-Administered Medications:     acetaminophen tablet 650 mg, 650 mg, Oral, Q8H PRN, Silviano Garcia MD    acetaminophen tablet 650 mg, 650 mg, Oral, Q4H PRN, Silviano Garcia MD, 650 mg at 04/09/23 2222    aluminum-magnesium hydroxide-simethicone 200-200-20 mg/5 mL suspension 30 mL, 30 mL, Oral, QID PRN, Silviano Garcia MD    bisacodyL suppository 10 mg, 10 mg, Rectal, Daily PRN, Silviano Garcia MD    cloNIDine tablet 0.1 mg, 0.1 mg, Oral, BID, Silviano Garcia MD, 0.1 mg at 04/11/23 0918    cloNIDine tablet 0.1 mg, 0.1 mg, Oral, Q8H PRN, Kenji Deras MD, 0.1 mg at 04/12/23 1301    dextrose 50% injection 12.5 g, 12.5 g, Intravenous, PRN, Silviano Garcia MD    dextrose 50% injection 25 g, 25 g, Intravenous, PRN, Silviano Garcia MD    ferrous gluconate tablet 324 mg, 324 mg, Oral, BID WM, Silviano Garcia MD, 324 mg at 04/12/23 1618    glucagon (human recombinant) injection 1 mg, 1 mg, Intramuscular, PRN, Silviano Garcia MD    glucose chewable tablet 16 g, 16 g, Oral, PRN, Silviano Garcia MD    glucose chewable tablet 24 g, 24 g, Oral, PRN, Silviano Garcia MD    heparin (porcine) injection 5,000 Units, 5,000 Units, Subcutaneous, Q8H, Silviano Garcia MD, 5,000 Units at 04/12/23 1307    HYDROcodone-acetaminophen 5-325 mg per tablet 1 tablet, 1 tablet, Oral, Q6H PRN, Silviano Garcia MD, 1 tablet at 04/12/23 1301    levothyroxine tablet 75 mcg, 75 mcg, Oral, Before breakfast, Silviano Garcia MD, 75 mcg at 04/12/23 0511     lisinopriL tablet 20 mg, 20 mg, Oral, Daily, Silviano Garcia MD, 20 mg at 04/12/23 0841    magnesium oxide tablet 800 mg, 800 mg, Oral, PRN, Silviano Garcia MD    magnesium oxide tablet 800 mg, 800 mg, Oral, PRN, Silviano Garcia MD    melatonin tablet 6 mg, 6 mg, Oral, Nightly PRN, Silviano aGrcia MD    morphine injection 3 mg, 3 mg, Intravenous, Q6H PRN, Silviano Garcia MD, 3 mg at 04/12/23 1019    naloxone 0.4 mg/mL injection 0.02 mg, 0.02 mg, Intravenous, PRN, Silviano Garcia MD    ondansetron injection 4 mg, 4 mg, Intravenous, Q8H PRN, Silviano Garcia MD    pantoprazole EC tablet 40 mg, 40 mg, Oral, Daily, Silviano Garcia MD, 40 mg at 04/12/23 0840    polyethylene glycol packet 17 g, 17 g, Oral, Daily, Silviano Garcia MD, 17 g at 04/12/23 0841    potassium bicarbonate disintegrating tablet 35 mEq, 35 mEq, Oral, PRN, Silviano Garcia MD    potassium bicarbonate disintegrating tablet 50 mEq, 50 mEq, Oral, PRN, Silviano Garcia MD    potassium bicarbonate disintegrating tablet 60 mEq, 60 mEq, Oral, PRN, Silviano Garcia MD    potassium, sodium phosphates 280-160-250 mg packet 2 packet, 2 packet, Oral, PRN, Silviano Garcia MD    potassium, sodium phosphates 280-160-250 mg packet 2 packet, 2 packet, Oral, PRN, Silviano Garcia MD    potassium, sodium phosphates 280-160-250 mg packet 2 packet, 2 packet, Oral, PRN, Silviano Garcia MD    prochlorperazine injection Soln 5 mg, 5 mg, Intravenous, Q6H PRN, Silviano Garcia MD    simethicone chewable tablet 80 mg, 1 tablet, Oral, QID PRN, Silviano Garcia MD    sodium chloride 0.9% flush 10 mL, 10 mL, Intravenous, Q12H PRN, Silviano Garcia MD, 10 mL at 04/09/23 0228    Assessment and Plan:    83-year-old female status post fall with left hip femoral neck fracture status post ORIF with femoral neck system.      PT/OT    DVT prophylaxis-heparin, Rocco's, SCDs    Disposition-skilled nursing    Ml Hernandez MD  [facility]  [phone]  This note has been transcribed with voice recognition software, but not reviewed and may  contain unrecognized errors.

## 2023-04-12 NOTE — PT/OT/SLP PROGRESS
Occupational Therapy   Treatment    Name: Daniela Harris  MRN: [id number]  Admitting Diagnosis:  Closed fracture of neck of left femur  2 Days Post-Op    Recommendations:     Discharge Recommendations: nursing facility, skilled  Discharge Equipment Recommendations:   (TBD)  Barriers to discharge:   (s/p fall with fx, dependent x2 person for all mobility)    Assessment:     Daniela Harris is a 83 y.o. female with a medical diagnosis of Closed fracture of neck of left femur.   Performance deficits affecting function are weakness, impaired endurance, impaired self care skills, impaired functional mobility, gait instability, impaired balance, impaired cognition, decreased coordination, decreased upper extremity function, decreased lower extremity function, decreased safety awareness, pain, decreased ROM, impaired skin, edema, orthopedic precautions.     Rehab Prognosis:  Fair/+ patient would benefit from acute skilled OT services to address these deficits and reach maximum level of function.       Plan:     Patient to be seen 5 x/week, 6 x/week to address the above listed problems via self-care/home management, therapeutic activities, therapeutic exercises  Plan of Care Expires: 04/25/23  Plan of Care Reviewed with: patient, son, family    Subjective     Chief Complaint: mostly non-verbal, facial grimacing with movement  Patient/Family Comments/goals: patient is unable to state, family visiting from out of state and requesting to allow grandchildren to visit  Pain/Comfort:  Pain Rating 1:  (unable to state, grimacing present with movement)  Pain Addressed 1: Pre-medicate for activity, Reposition, Distraction, Cessation of Activity, Nurse notified    Objective:     Communicated with: Tawanna sotelo prior to session.  Patient found left sidelying with pressure relief boots, peripheral IV, PureWick, SCD (Avasys and wedge to right side) upon OT entry to room.    General Precautions: Standard, fall    Orthopedic  "Precautions:LLE weight bearing as tolerated  Braces: N/A  Respiratory Status: Room air     Occupational Performance:     Bed Mobility:    Patient completed Rolling/Turning to Left with  total assistance and 2 persons  Patient completed Scooting/Bridging with total assistance and 2 persons  Patient completed Supine to Sit with total assistance and 2 persons  Patient completed Sit to Supine with total assistance and 2 persons     Functional Mobility/Transfers:  Functional Mobility: The patient tolerated sitting on the EOB with dependent sitting balance with posterior lean initially. The patient resisted attempts for anterior, gentle rocking and gripped the left bed rail to resist movement. The patient was able to sit unsupported with SBA for >15 minutes.  The patient was dependent to  to uncross legs and to bring the knee back to place the right foot on the floor.    Activities of Daily Living:  Grooming: dependence The patient did not initiate wiping face face or hands with a warm cloth and resisted OT attempt to guide her left and right hand to her face  Upper Body Dressing: dependence        [facility] 6 Click ADL: 6    Treatment & Education:  Self care and functional mobility as noted above  The patient was able to state her name "Daniela" but did not verbally respond to other questions  The patient turned her head to the left and right in response to family visit. The patient was noted to have some spontaneous reaching with her right hand to touch her granddaughters hair or the baby's leg but did not follow commands.   Sat on the EOB ~30 min with dep<>SBA for balance  Educated the family re: patient POC, patient response to therapy  All questions were answered within OT scope of practice    Patient left right sidelying with all lines intact, call button in reach, bed alarm on, granddaughter present, and positioning wedge/pressure relief boots in place    GOALS:   Multidisciplinary Problems       Occupational Therapy " Goals          Problem: Occupational Therapy    Goal Priority Disciplines Outcome Interventions   Occupational Therapy Goal     OT, PT/OT Ongoing, Progressing    Description: Goals to be met by: 04/25/23     Patient will increase functional independence with ADLs by performing:    Feeding with Minimal Assistance (finger foods).  Patient will stand statically for 2 min with Minimal assistance to assist with toileting for hygiene and clothing management.   Sitting at edge of bed x15 minutes with Supervision.  Supine to sit with Minimal Assistance.  Step transfer with Minimal Assistance  Toilet transfer to bedside commode with Minimal Assistance.  Upper extremity exercise program x15 reps per handout, with assistance as needed.                         Time Tracking:     OT Date of Treatment: 04/12/23  OT Start Time: 1102  OT Stop Time: 1145  OT Total Time (min): 43 min    Billable Minutes:Self Care/Home Management 15  Therapeutic Activity 28  Total Time 43 (with PT)    OT/MAGGY: OT     Number of MAGGY visits since last OT visit: 0    4/12/2023

## 2023-04-12 NOTE — PLAN OF CARE
Problem: Fall Injury Risk  Goal: Absence of Fall and Fall-Related Injury  Intervention: Identify and Manage Contributors  Flowsheets (Taken 4/12/2023 0321)  Self-Care Promotion: BADL personal objects within reach  Medication Review/Management:   medications reviewed   high-risk medications identified  Intervention: Promote Injury-Free Environment  Flowsheets (Taken 4/12/2023 0321)  Safety Promotion/Fall Prevention:   assistive device/personal item within reach   bed alarm set   /camera at bedside   side rails raised x 2   high risk medications identified   medications reviewed   lighting adjusted   nonskid shoes/socks when out of bed   Fall Risk reviewed with patient/family     Problem: Skin Injury Risk Increased  Goal: Skin Health and Integrity  Intervention: Promote and Optimize Oral Intake  Flowsheets (Taken 4/12/2023 0321)  Oral Nutrition Promotion:   physical activity promoted   rest periods promoted

## 2023-04-12 NOTE — PT/OT/SLP PROGRESS
"Physical Therapy Treatment    Patient Name:  Daniela Harris   MRN:  [id number]    Recommendations:     Discharge Recommendations: nursing facility, skilled  Discharge Equipment Recommendations:  (TBD)  Barriers to discharge home: Pt with decreased functional mobility and ambulation at this time.    Assessment:     Daniela Harris is a 83 y.o. female admitted with a medical diagnosis of Closed fracture of neck of left femur.  She presents with the following impairments/functional limitations: impaired self care skills, impaired functional mobility, gait instability, impaired balance, impaired cognition, decreased coordination, decreased upper extremity function, decreased lower extremity function, decreased safety awareness, pain, decreased ROM, impaired fine motor, impaired skin, edema.    Rehab Prognosis: Fair; patient would benefit from acute skilled PT services to address these deficits and reach maximum level of function.    Recent Surgery: Procedure(s) (LRB):  ORIF, HIP PINNING SYTHNES (Left) 2 Days Post-Op    Plan:     During this hospitalization, patient to be seen 6 x/week to address the identified rehab impairments via gait training, therapeutic activities, therapeutic exercises, wheelchair management/training and progress toward the following goals:    Plan of Care Expires:  04/25/23    Subjective     Chief Complaint: Pt unable to verbalize pain, appeared to be in pain/distress during functional mobility.  Pt stated, "It's hard."  Patient/Family Comments/goals: Family would like pt to have an opportunity with therapy.   Pain/Comfort:  Pain Rating 1:  (Pt appeared to be in pain/distress during functional mobility today, more so compared to yesterday session.)  Pain Addressed 1: Pre-medicate for activity, Reposition, Distraction, Cessation of Activity      Objective:     Communicated with nurse Acosta prior to session.  Patient found left sidelying with bed alarm, peripheral IV, SCD, pressure relief boots, " Priscilla (Avasys monitor) upon PT entry to room.     General Precautions: Standard, fall  Orthopedic Precautions: LLE weight bearing as tolerated  Braces: N/A  Respiratory Status: Room air     Functional Mobility: Pt alert and appeared comfortable at rest, limited verbal communication at baseline.  Pt appeared to be in more pain/distress during functional mobility today compared to yesterday therapy session.  Pt required extra time and max A to achieve fair static sitting today.  Pt with increased resistance to PROM to BLE.  Multiple family members visting today, son, grandchildren, and great grandchildren.    Bed Mobility:     Rolling Left:  dependence and of 2 persons  Rolling Right: dependence and of 2 persons  Scooting: dependence and of 2 persons for anterior scooting  Bridging: dependence and of 2 persons to reposition HOB with bed in trendelenburg  Supine to Sit: dependence and of 2 persons with HOB elevated   Sit to Supine: dependence and of 2 persons  Balance: Initially, upon sitting EOB, pt with posterior lean and required max A and TC's to assist with trunk flex and B foot placement on the floor.  Over time, pt able to achieve fair static sit balance EOB with mostly RUE support on bed rail.     AM-PAC 6 CLICK MOBILITY  Turning over in bed (including adjusting bedclothes, sheets and blankets)?: 2  Sitting down on and standing up from a chair with arms (e.g., wheelchair, bedside commode, etc.): 1  Moving from lying on back to sitting on the side of the bed?: 2  Moving to and from a bed to a chair (including a wheelchair)?: 1  Need to walk in hospital room?: 1  Climbing 3-5 steps with a railing?: 1  Basic Mobility Total Score: 8       Treatment & Education:  PROM to BLE in supine in all available planes and as tolerated.  PROM was difficult, met with max resistance from pt.      Balance Training  Static Sitting:  Patient performed static sitting on  EOB   using  with RUE support on bedrail  with Maximal  Assistance initially then required only SBA and maximal verbal cues for trunk flex and B foot placement on floor.     Dynamic Sitting:  Patient performed dynamic sitting on  EOB  with max- Moderate Assistance and maximal verbal cues during minimal excursions for reaching of towel and pillow.  Multiple family members also visiting, engaging pt to flex at trunk and reaching out with BUE for hugs and kisses.       Patient left right sidelying, HOB elevated @30*, RUE elevated on pillow, and pillow between LE with all lines intact, call button in reach, bed alarm on, nurse Karla notified, and Avasys monitor and grand-dtr present.  SCD/B pressure relief boots reapplied.     GOALS:   Multidisciplinary Problems       Physical Therapy Goals          Problem: Physical Therapy    Goal Priority Disciplines Outcome Goal Variances Interventions   Physical Therapy Goal     PT, PT/OT Ongoing, Progressing     Description: Goals to be met by: 23     Patient will increase functional independence with mobility by performin. Supine to sit with Moderate Assistance  2. Rolling to Left and Right with Moderate Assistance  3. Sit to stand transfer with Moderate Assistance using RW  4. Bed to chair transfer with Moderate Assistance   5. Gait ~20-30 feet with Moderate Assistance using Rolling Walker   6. Wheelchair propulsion ~ feet with Minimal Assistance using bilateral upper extremities  7. Lower extremity exercise program x10 reps per handout, with assistance as needed                         Time Tracking:     PT Received On: 23  PT Start Time: 1105     PT Stop Time: 1145  PT Total Time (min): 40 min     Billable Minutes: Therapeutic Activity 30 min and Therapeutic Exercise 10 min co-tx with OT    Treatment Type: Treatment              2023

## 2023-04-12 NOTE — NURSING
[facility], [facility]  Nurses Note -- 4 Eyes      4/12/2023       Skin assessed on: Q Shift      [x] No Pressure Injuries Present    []Prevention Measures Documented    [] Yes LDA  for Pressure Injury Previously documented     [] Yes New Pressure Injury Discovered   [] LDA for New Pressure Injury Added      Attending RN:  Karla Roach RN     Second RN:  JASPREET Townsend

## 2023-04-12 NOTE — PHYSICIAN QUERY
PT Name: Daniela Harris  MR #: [id number]     Documentation Clarification       CDI : Mamie Holland RN, CDS              Contact information: [email or web address]     This form is a permanent document in the medical record.     Query Date: April 12, 2023    By submitting this query, we are merely seeking further clarification of documentation. Please utilize your independent clinical judgment when addressing the question(s) below.    The Medical Record reflects the following:    Supporting Clinical Findings Location in Medical Record   Appearance :cloudy     protein : 2+   occult blood : 2+    leukocytes :3+    RBC > 100     WBC > 100     Bacteria:  Many     WBC clumps:  Many    Multiple organisms isolated. None in predominance    In the ED, UA showed +3 leukocytes, > 100 WBCs, and many bacteria.    Incidental UTI. IV Levaquin ordered    Clean catch Urinalysis 4/9    Urine culture 4/9    Hospital Medicine 4/12    ED provider 4/9                                                                                Provider, please provide diagnosis or diagnoses associated with above clinical findings.    [ X  ] Bacteriuria only     [   ] UTI     [   ] Other (please specify): ____________

## 2023-04-12 NOTE — PLAN OF CARE
Problem: Occupational Therapy  Goal: Occupational Therapy Goal  Description: Goals to be met by: 04/25/23     Patient will increase functional independence with ADLs by performing:    Feeding with Minimal Assistance (finger foods).  Patient will stand statically for 2 min with Minimal assistance to assist with toileting for hygiene and clothing management.   Sitting at edge of bed x15 minutes with Supervision.  Supine to sit with Minimal Assistance.  Step transfer with Minimal Assistance  Toilet transfer to bedside commode with Minimal Assistance.  Upper extremity exercise program x15 reps per handout, with assistance as needed.    Outcome: Ongoing, Progressing

## 2023-04-12 NOTE — NURSING
[facility], [facility]  Nurses Note -- 4 Eyes      4/11/2023       Skin assessed on: Admit      [x] No Pressure Injuries Present    []Prevention Measures Documented    [] Yes LDA  for Pressure Injury Previously documented     [] Yes New Pressure Injury Discovered   [] LDA for New Pressure Injury Added      Attending RN:  Rosalino Cerna RN     Second RN:  jelani norman

## 2023-04-13 PROCEDURE — 97535 SELF CARE MNGMENT TRAINING: CPT

## 2023-04-13 PROCEDURE — 97530 THERAPEUTIC ACTIVITIES: CPT

## 2023-04-13 PROCEDURE — 97530 THERAPEUTIC ACTIVITIES: CPT | Mod: CQ

## 2023-04-13 PROCEDURE — 11000001 HC ACUTE MED/SURG PRIVATE ROOM

## 2023-04-13 PROCEDURE — 25000003 PHARM REV CODE 250: Performed by: STUDENT IN AN ORGANIZED HEALTH CARE EDUCATION/TRAINING PROGRAM

## 2023-04-13 PROCEDURE — 63600175 PHARM REV CODE 636 W HCPCS: Performed by: STUDENT IN AN ORGANIZED HEALTH CARE EDUCATION/TRAINING PROGRAM

## 2023-04-13 PROCEDURE — 97110 THERAPEUTIC EXERCISES: CPT | Mod: CQ

## 2023-04-13 RX ADMIN — Medication 324 MG: at 04:04

## 2023-04-13 RX ADMIN — POLYETHYLENE GLYCOL 3350 17 G: 17 POWDER, FOR SOLUTION ORAL at 08:04

## 2023-04-13 RX ADMIN — CLONIDINE HYDROCHLORIDE 0.1 MG: 0.1 TABLET ORAL at 08:04

## 2023-04-13 RX ADMIN — CLONIDINE HYDROCHLORIDE 0.1 MG: 0.1 TABLET ORAL at 10:04

## 2023-04-13 RX ADMIN — HYDROCODONE BITARTRATE AND ACETAMINOPHEN 1 TABLET: 5; 325 TABLET ORAL at 12:04

## 2023-04-13 RX ADMIN — HEPARIN SODIUM 5000 UNITS: 5000 INJECTION INTRAVENOUS; SUBCUTANEOUS at 01:04

## 2023-04-13 RX ADMIN — HEPARIN SODIUM 5000 UNITS: 5000 INJECTION INTRAVENOUS; SUBCUTANEOUS at 10:04

## 2023-04-13 RX ADMIN — PANTOPRAZOLE SODIUM 40 MG: 40 TABLET, DELAYED RELEASE ORAL at 08:04

## 2023-04-13 RX ADMIN — HYDROCODONE BITARTRATE AND ACETAMINOPHEN 1 TABLET: 5; 325 TABLET ORAL at 05:04

## 2023-04-13 RX ADMIN — HEPARIN SODIUM 5000 UNITS: 5000 INJECTION INTRAVENOUS; SUBCUTANEOUS at 05:04

## 2023-04-13 RX ADMIN — LISINOPRIL 20 MG: 20 TABLET ORAL at 08:04

## 2023-04-13 RX ADMIN — Medication 324 MG: at 08:04

## 2023-04-13 RX ADMIN — LEVOTHYROXINE SODIUM 75 MCG: 75 TABLET ORAL at 05:04

## 2023-04-13 NOTE — PLAN OF CARE
Patient slept adequately overnight, skin integrity maintained with frequent repositioning. See MAR for pain medication interventions.     Problem: Fall Injury Risk  Goal: Absence of Fall and Fall-Related Injury  Outcome: Ongoing, Progressing     Problem: Skin Injury Risk Increased  Goal: Skin Health and Integrity  Outcome: Ongoing, Progressing

## 2023-04-13 NOTE — PLAN OF CARE
Problem: Adult Inpatient Plan of Care  Goal: Plan of Care Review  Outcome: Ongoing, Progressing  Flowsheets (Taken 4/13/2023 1723)  Plan of Care Reviewed With: patient  Goal: Patient-Specific Goal (Individualized)  Outcome: Ongoing, Progressing  Goal: Absence of Hospital-Acquired Illness or Injury  Outcome: Ongoing, Progressing  Intervention: Identify and Manage Fall Risk  Flowsheets (Taken 4/13/2023 1723)  Safety Promotion/Fall Prevention:   assistive device/personal item within reach   medications reviewed   nonskid shoes/socks when out of bed   room near unit station   Fall Risk reviewed with patient/family   high risk medications identified   side rails raised x 2   instructed to call staff for mobility  Intervention: Prevent Skin Injury  Flowsheets (Taken 4/13/2023 1723)  Body Position:   turned   weight shifting  Skin Protection:   adhesive use limited   tubing/devices free from skin contact  Intervention: Prevent and Manage VTE (Venous Thromboembolism) Risk  Flowsheets (Taken 4/13/2023 1723)  Activity Management: Rolling - L1  VTE Prevention/Management:   ambulation promoted   bleeding precations maintained   bleeding risk assessed  Range of Motion: active ROM (range of motion) encouraged  Intervention: Prevent Infection  Flowsheets (Taken 4/13/2023 1723)  Infection Prevention: hand hygiene promoted  Goal: Optimal Comfort and Wellbeing  Outcome: Ongoing, Progressing  Goal: Readiness for Transition of Care  Outcome: Ongoing, Progressing     Problem: Fall Injury Risk  Goal: Absence of Fall and Fall-Related Injury  Outcome: Ongoing, Progressing  Intervention: Identify and Manage Contributors  Flowsheets (Taken 4/13/2023 1723)  Self-Care Promotion:   independence encouraged   BADL personal objects within reach   BADL personal routines maintained   safe use of adaptive equipment encouraged   meal set-up provided  Medication Review/Management:   high-risk medications identified   medications  reviewed  Intervention: Promote Injury-Free Environment  Flowsheets (Taken 4/13/2023 1723)  Safety Promotion/Fall Prevention:   assistive device/personal item within reach   medications reviewed   nonskid shoes/socks when out of bed   room near unit station   Fall Risk reviewed with patient/family   high risk medications identified   side rails raised x 2   instructed to call staff for mobility     Problem: Skin Injury Risk Increased  Goal: Skin Health and Integrity  Outcome: Ongoing, Progressing  Intervention: Optimize Skin Protection  Flowsheets (Taken 4/13/2023 1723)  Pressure Reduction Techniques:   frequent weight shift encouraged   pressure points protected   weight shift assistance provided   heels elevated off bed  Skin Protection:   adhesive use limited   tubing/devices free from skin contact  Head of Bed (HOB) Positioning: HOB at 30-45 degrees   Pt alert able to make needs known,mary meds well,no s/s adverse reaction noted,reposition self q 2hrs,pain controlled by prn pain medication,POC explained,remains free from falls and pressure injuries,safety maintained,continue monitoring.

## 2023-04-13 NOTE — ASSESSMENT & PLAN NOTE
Presented after an unwitnessed fall.    Left CT hip showed a transcervical femoral neck fracture.  Plan:   Orthopedic surgery consulted.  S/P ORIF on 4/10  Pain control   DVT prophylaxis   PT/OT consult.  Recommending SNF.  CM/SW consulted.  Discharge to SNF once arranged.

## 2023-04-13 NOTE — PROGRESS NOTES
[facility] Medicine  Progress Note    Patient Name: Daniela Harris  MRN: [id number]  Patient Class: IP- Inpatient   Admission Date: 4/9/2023  Length of Stay: 4 days  Attending Physician: Kenji Deras MD  Primary Care Provider: Tayo Varela MD        Subjective:     Principal Problem:Closed fracture of neck of left femur        HPI:  This is an 83-year-old female with a past medical history of Alzheimer's dementia, hypertension, hyperlipidemia, CKD 4, hypothyroidism, PUD, who presents after a fall.    Patient had an unwitnessed fall the morning of presentation while she was at her nursing home.  Per the patient's son, Jayant, she was left sitting on the edge of the bed and later fell down to the floor, unknown if she hit her head.  She uses a walker to ambulate at baseline.  Patient has a history of dementia and is unable to contribute to the history.    In the ED, the patient was hypertensive.  Labs remarkable for leukocytosis (13.4), normocytic anemia (10.7-baseline 11-12), elevated creatinine (1.6-around baseline).  UA showed +3 leukocytes, > 100 WBCs, and many bacteria.  CT head/cervical spine showed no acute process.  Left CT hip showed a transcervical femoral neck fracture.  The patient was admitted for further management.      Overview/Hospital Course:  84 y/o female admitted with hip fracture.  Ortho consulted and underwent ORIF on 4/10.  PT/OT consulted.  Recommending SNF.  SW/CM consulted.      Interval History: seems very comfortable.    Review of Systems   Unable to perform ROS: Dementia   Objective:     Vital Signs (Most Recent):  Temp: 97.7 °F (36.5 °C) (04/13/23 1053)  Pulse: 66 (04/13/23 1053)  Resp: 18 (04/13/23 1212)  BP: (!) 107/53 (04/13/23 1053)  SpO2: (!) 92 % (04/13/23 1053)   Vital Signs (24h Range):  Temp:  [97.7 °F (36.5 °C)-99.4 °F (37.4 °C)] 97.7 °F (36.5 °C)  Pulse:  [66-93] 66  Resp:  [18-22] 18  SpO2:  [91 %-95 %] 92 %  BP: (107-155)/(53-65) 107/53      Weight: 81 kg (178 lb 9.2 oz)  Body mass index is 25.62 kg/m².    Intake/Output Summary (Last 24 hours) at 4/13/2023 1334  Last data filed at 4/13/2023 1212  Gross per 24 hour   Intake 480 ml   Output 180 ml   Net 300 ml        Physical Exam  Constitutional:       General: She is not in acute distress.     Appearance: She is not toxic-appearing or diaphoretic.   HENT:      Mouth/Throat:      Pharynx: Oropharynx is clear. No oropharyngeal exudate or posterior oropharyngeal erythema.   Cardiovascular:      Rate and Rhythm: Normal rate and regular rhythm.      Heart sounds: No murmur heard.    No gallop.   Pulmonary:      Effort: Pulmonary effort is normal. No respiratory distress.      Breath sounds: Normal breath sounds. No wheezing.   Abdominal:      General: Bowel sounds are normal. There is no distension.      Palpations: Abdomen is soft.      Tenderness: There is no abdominal tenderness.   Neurological:      Comments: Awake and alert.  Disoriented        Significant Labs: All pertinent labs within the past 24 hours have been reviewed.  BMP:   No results for input(s): GLU, NA, K, CL, CO2, BUN, CREATININE, CALCIUM, MG in the last 48 hours.    CBC:   No results for input(s): WBC, HGB, HCT, PLT in the last 48 hours.      Significant Imaging: I have reviewed all pertinent imaging results/findings within the past 24 hours.      Assessment/Plan:      * Closed fracture of neck of left femur, non displaced   Presented after an unwitnessed fall.    Left CT hip showed a transcervical femoral neck fracture.  Plan:   Orthopedic surgery consulted.  S/P ORIF on 4/10  Pain control   DVT prophylaxis   PT/OT consult.  Recommending SNF.  CM/SW consulted.  Discharge to SNF once arranged.    CKD (chronic kidney disease) stage 4  At baseline.  Continue to monitor.      Late onset Alzheimer's disease without behavioral disturbance  No acute issues.      Hypothyroidism  Continue synthroid.      Hypertension  Resume home  meds      Hyperlipidemia  Outpatient follow-up.        VTE Risk Mitigation (From admission, onward)         Ordered     heparin (porcine) injection 5,000 Units  Every 8 hours         04/09/23 1911     IP VTE HIGH RISK PATIENT  Once         04/09/23 1911     Place sequential compression device  Until discontinued         04/09/23 1911                Discharge Planning   ARNEL:      Code Status: Full Code   Is the patient medically ready for discharge?: Yes    Reason for patient still in hospital (select all that apply): Pending disposition  Discharge Plan A: Skilled Nursing Facility   Discharge Delays: (!) Post-Acute Set-up              Kenji Deras MD  Department of Hospital Medicine   [facility] [place] - [facility] S[facility]

## 2023-04-13 NOTE — PLAN OF CARE
Problem: Physical Therapy  Goal: Physical Therapy Goal  Description: Goals to be met by: 23     Patient will increase functional independence with mobility by performin. Supine to sit with Moderate Assistance  2. Rolling to Left and Right with Moderate Assistance  3. Sit to stand transfer with Moderate Assistance using RW  4. Bed to chair transfer with Moderate Assistance   5. Gait ~20-30 feet with Moderate Assistance using Rolling Walker   6. Wheelchair propulsion ~ feet with Minimal Assistance using bilateral upper extremities  7. Lower extremity exercise program x10 reps per handout, with assistance as needed    Outcome: Ongoing, Progressing   Pt with confusion and resisted back with every moments. Pt still required Dependence of 2 persons for Bed Mobility/ Transfer. Pt mary ~10 min in sitting at EOB: Pt with Max posterior and R side leaning 2* still afraid to put weight through her L side; required Max A for posterior and R side support initially, however, pt was able to hold herself up in sitting with no support (RUE hold on BR) after performing fwd/bwd and side to side rocking ex with PTA/OT. 2 attempts Sit <> Stand from EOB with dependence and of 2 persons; 1st with RW and 2nd with no AD, pt wasn't able to bend her knees even with assistance (PROM) pt resisted back prior standing, pt didn't achieve a full stand 2* pt with her RLE crossing over LLE. Attempted for the 3rd trial, however, pt resisted back. Pt will ocnt to benefit from skilled therapy at [facility] with multidisciplinary approach to reach goals.

## 2023-04-13 NOTE — PLAN OF CARE
Problem: Occupational Therapy  Goal: Occupational Therapy Goal  Description: Goals to be met by: 04/25/23     Patient will increase functional independence with ADLs by performing:    Feeding with Minimal Assistance (finger foods).  Patient will stand statically for 2 min with Minimal assistance to assist with toileting for hygiene and clothing management.   Sitting at edge of bed x15 minutes with Supervision.  Supine to sit with Minimal Assistance.  Step transfer with Minimal Assistance  Toilet transfer to bedside commode with Minimal Assistance.  Upper extremity exercise program x15 reps per handout, with assistance as needed.    Outcome: Ongoing, Progressing   The patient will benefit from SNF

## 2023-04-13 NOTE — PT/OT/SLP PROGRESS
Occupational Therapy   Treatment    Name: Daniela Harris  MRN: [id number]  Admitting Diagnosis:  Closed fracture of neck of left femur  3 Days Post-Op    Recommendations:     Discharge Recommendations: nursing facility, skilled  Discharge Equipment Recommendations:   (TBD at SNF)  Barriers to discharge:   (total assist for all self care and mobilty, S/P hip fx)    Assessment:     Daniela Harris is a 83 y.o. female with a medical diagnosis of Closed fracture of neck of left femur.  Performance deficits affecting function are weakness, impaired endurance, impaired self care skills, impaired functional mobility, gait instability, impaired balance, impaired cognition, decreased coordination, decreased upper extremity function, decreased lower extremity function, decreased safety awareness, pain, decreased ROM, impaired skin, impaired coordination, orthopedic precautions.   The patient was alert but unable to follow commands to participate in functional tasks. The patient was dependent x2 person for all functional mobility.     Rehab Prognosis:  Fair; patient would benefit from acute skilled OT services to address these deficits and reach maximum level of function.       Plan:     Patient to be seen 5 x/week to address the above listed problems via self-care/home management, therapeutic activities, therapeutic exercises  Plan of Care Expires: 04/25/23  Plan of Care Reviewed with: patient    Subjective     Chief Complaint: none, mostly non-verbal  Patient/Family Comments/goals: unable to state  Pain/Comfort:  Pain Rating 1:  (unable to state, grimaces with movement)  Location - Side 1: Left  Location 1: leg  Pain Addressed 1: Pre-medicate for activity, Reposition, Distraction, Cessation of Activity, Nurse notified    Objective:     Communicated with: nurseDeanna prior to session.  Patient found HOB elevated with pressure relief boots, SCD, PureWick, bed alarm (Avasys) upon OT entry to room.    General Precautions:  Standard, fall    Orthopedic Precautions:LLE weight bearing as tolerated  Braces: N/A  Respiratory Status: Room air     Occupational Performance:     Bed Mobility:    Patient completed Rolling/Turning to Left with  dependent  Patient completed Rolling/Turning to Right with dependent  Patient completed Scooting/Bridging with total assistance and 2 persons  Patient completed Supine to Sit with dependent and 2 persons  Patient completed Sit to Supine with dependent and 2 persons     Functional Mobility/Transfers:  Patient completed Sit <> Stand Transfer with total assistance and of 2 persons  with  hand-held assist and rolling walker   Functional Mobility: The patient resisted standing x2 attempts, unable to achieve stand    Activities of Daily Living:  Grooming: dependence    Upper Body Dressing: dependence    Lower Body Dressing: dependence        [facility] 6 Click ADL: 6    Treatment & Education:  Sat on the EOB ~15 min with total assist with posterior lean to SBA/CGA for static sitting balance. The patient shifts weight to the right to avoid sitting on the left him  Family was not present to provide education    Patient left right sidelying with all lines intact, call button in reach, bed alarm off, nurse notified, Avasys present, and positioning wedge and boots reapplied at end of session    GOALS:   Multidisciplinary Problems       Occupational Therapy Goals          Problem: Occupational Therapy    Goal Priority Disciplines Outcome Interventions   Occupational Therapy Goal     OT, PT/OT Ongoing, Progressing    Description: Goals to be met by: 04/25/23     Patient will increase functional independence with ADLs by performing:    Feeding with Minimal Assistance (finger foods).  Patient will stand statically for 2 min with Minimal assistance to assist with toileting for hygiene and clothing management.   Sitting at edge of bed x15 minutes with Supervision.  Supine to sit with Minimal Assistance.  Step transfer with  Minimal Assistance  Toilet transfer to bedside commode with Minimal Assistance.  Upper extremity exercise program x15 reps per handout, with assistance as needed.                         Time Tracking:     OT Date of Treatment: 04/13/23  OT Start Time: 1439  OT Stop Time: 1506  OT Total Time (min): 27 min    Billable Minutes:Self Care/Home Management 10  Therapeutic Activity 17  Total Time 27 (with PT)    OT/MAGGY: OT     Number of MAGGY visits since last OT visit: 0    4/13/2023

## 2023-04-13 NOTE — NURSING
[facility], [facility]  Nurses Note -- 4 Eyes      4/13/2023       Skin assessed on: Q Shift      [x] No Pressure Injuries Present    []Prevention Measures Documented    [] Yes LDA  for Pressure Injury Previously documented     [] Yes New Pressure Injury Discovered   [] LDA for New Pressure Injury Added      Attending RN:  Deanna Piedra LPN     Second RN:  ANA PAULA Lang RN

## 2023-04-13 NOTE — SUBJECTIVE & OBJECTIVE
Interval History: seems very comfortable.    Review of Systems   Unable to perform ROS: Dementia   Objective:     Vital Signs (Most Recent):  Temp: 97.7 °F (36.5 °C) (04/13/23 1053)  Pulse: 66 (04/13/23 1053)  Resp: 18 (04/13/23 1212)  BP: (!) 107/53 (04/13/23 1053)  SpO2: (!) 92 % (04/13/23 1053)   Vital Signs (24h Range):  Temp:  [97.7 °F (36.5 °C)-99.4 °F (37.4 °C)] 97.7 °F (36.5 °C)  Pulse:  [66-93] 66  Resp:  [18-22] 18  SpO2:  [91 %-95 %] 92 %  BP: (107-155)/(53-65) 107/53     Weight: 81 kg (178 lb 9.2 oz)  Body mass index is 25.62 kg/m².    Intake/Output Summary (Last 24 hours) at 4/13/2023 1334  Last data filed at 4/13/2023 1212  Gross per 24 hour   Intake 480 ml   Output 180 ml   Net 300 ml        Physical Exam  Constitutional:       General: She is not in acute distress.     Appearance: She is not toxic-appearing or diaphoretic.   HENT:      Mouth/Throat:      Pharynx: Oropharynx is clear. No oropharyngeal exudate or posterior oropharyngeal erythema.   Cardiovascular:      Rate and Rhythm: Normal rate and regular rhythm.      Heart sounds: No murmur heard.    No gallop.   Pulmonary:      Effort: Pulmonary effort is normal. No respiratory distress.      Breath sounds: Normal breath sounds. No wheezing.   Abdominal:      General: Bowel sounds are normal. There is no distension.      Palpations: Abdomen is soft.      Tenderness: There is no abdominal tenderness.   Neurological:      Comments: Awake and alert.  Disoriented        Significant Labs: All pertinent labs within the past 24 hours have been reviewed.  BMP:   No results for input(s): GLU, NA, K, CL, CO2, BUN, CREATININE, CALCIUM, MG in the last 48 hours.    CBC:   No results for input(s): WBC, HGB, HCT, PLT in the last 48 hours.      Significant Imaging: I have reviewed all pertinent imaging results/findings within the past 24 hours.

## 2023-04-13 NOTE — PT/OT/SLP PROGRESS
Physical Therapy Treatment    Patient Name:  Daniela Harris   MRN:  [id number]    Recommendations:     Discharge Recommendations: nursing facility, skilled  Discharge Equipment Recommendations:  (TBD)  Barriers to discharge:  high fall risk, Dependence of 2 persons for Bed Mobility, unable to Stand with assistance to this day    Assessment:     Danilea Harris is a 83 y.o. female admitted with a medical diagnosis of Closed fracture of neck of left femur.  She presents with the following impairments/functional limitations: weakness, impaired endurance, impaired self care skills, impaired functional mobility, gait instability, impaired balance, impaired cognition, decreased upper extremity function, decreased lower extremity function, decreased safety awareness, pain, decreased ROM, impaired coordination, impaired skin, orthopedic precautions.    Pt with confusion and resisted back with every moments. Pt still required Dependence of 2 persons for Bed Mobility/ Transfer. Pt mary ~10 min in sitting at EOB: Pt with Max posterior and R side leaning 2* still afraid to put weight through her L side; required Max A for posterior and R side support initially, however, pt was able to hold herself up in sitting with no support (RUE hold on BR) after performing fwd/bwd and side to side rocking ex with PTA/OT. 2 attempts Sit <> Stand from EOB with dependence and of 2 persons; 1st with RW and 2nd with no AD, pt wasn't able to bend her knees even with assistance (PROM) pt resisted back prior standing, pt didn't achieve a full stand 2* pt with her RLE crossing over LLE. Attempted for the 3rd trial, however, pt resisted back. Pt will ocnt to benefit from skilled therapy at SNF with multidisciplinary approach to reach goals.    Rehab Prognosis: Fair; patient would benefit from acute skilled PT services to address these deficits and reach maximum level of function.    Recent Surgery: Procedure(s) (LRB):  ORIF, HIP PINNING MERCY (Left) 3  Days Post-Op    Plan:     During this hospitalization, patient to be seen 6 x/week to address the identified rehab impairments via gait training, therapeutic activities, therapeutic exercises, wheelchair management/training and progress toward the following goals:    Plan of Care Expires:  04/25/23    Subjective     Chief Complaint: n/a  Patient/Family Comments/goals: n/a  Pain/Comfort:  Pain Rating 1:  (unable to state, grimacing present with movement)  Location - Side 1: Left  Location 1: leg  Pain Addressed 1: Pre-medicate for activity, Reposition, Distraction, Nurse notified, Cessation of Activity      Objective:     Communicated with nurse Deanna GÓMEZ prior to session.  Patient found HOB elevated with pressure relief boots, SCD, peripheral IV, PureWick (AvaSys) upon PT entry to room.     General Precautions: Standard, fall  Orthopedic Precautions: LLE weight bearing as tolerated  Braces: N/A  Respiratory Status: Room air     Functional Mobility:  Bed Mobility:     Scooting: anterior scoot to EOB with dependence and of 2 persons  Bridging: toward HOB with dependence and of 2 persons  Supine to Sit: dependence and of 2 persons; Pt with Max posterior and R side leaning 2* still afraid to put weight through her L side; required Max A for posterior and R side support initially, however, pt was able to hold herself up in sitting with no support after performing fwd/bwd and side to side rocking ex with PTA/OT  Sit to Supine: dependence and of 2 persons  Transfers:   Gait belt don prior standing activity  Sit to Stand:  2 attempts from EOB with dependence and of 2 persons; 1st with RW and 2nd with no AD, however, didn't achieve a full stand 2* pt with her RLE crossing over LLE and wasn't able to bend her knees even with assistance (PROM) pt resisted back prior standing. Attempted for the 3rd trial, however, pt resisted back   Balance: Fair Sitting      AM-PAC 6 CLICK MOBILITY  Turning over in bed (including adjusting  bedclothes, sheets and blankets)?: 2  Sitting down on and standing up from a chair with arms (e.g., wheelchair, bedside commode, etc.): 1  Moving from lying on back to sitting on the side of the bed?: 2  Moving to and from a bed to a chair (including a wheelchair)?: 1  Need to walk in hospital room?: 1  Climbing 3-5 steps with a railing?: 1  Basic Mobility Total Score: 8       Treatment & Education:  Pt mary ~10 min in sitting at EOB: Pt with Max posterior and R side leaning 2* still afraid to put weight through her L side; required Max A for posterior and R side support initially, however, pt was able to hold herself up in sitting with no support (RUE hold on BR) after performing fwd/bwd and side to side rocking ex with PTA/OT  Fwd/Bwd rocking 10 reps  Side to side rocking 10 reps      Patient left HOB elevated with B Pressure Relief Boots, B SCD, L side offloaded by wedge, RUE offloaded by pillow, tray table at bedside on the L side, AvaSys, all lines intact, call button in reach, bed alarm on, and nurse notified.    GOALS:   Multidisciplinary Problems       Physical Therapy Goals          Problem: Physical Therapy    Goal Priority Disciplines Outcome Goal Variances Interventions   Physical Therapy Goal     PT, PT/OT Ongoing, Progressing     Description: Goals to be met by: 23     Patient will increase functional independence with mobility by performin. Supine to sit with Moderate Assistance  2. Rolling to Left and Right with Moderate Assistance  3. Sit to stand transfer with Moderate Assistance using RW  4. Bed to chair transfer with Moderate Assistance   5. Gait ~20-30 feet with Moderate Assistance using Rolling Walker   6. Wheelchair propulsion ~ feet with Minimal Assistance using bilateral upper extremities  7. Lower extremity exercise program x10 reps per handout, with assistance as needed                         Time Tracking:     PT Received On: 23  PT Start Time: 1439     PT Stop  Time: 1506  PT Total Time (min): 27 min     Billable Minutes: Therapeutic Activity 17 min min and Therapeutic Exercise 10 min (Co-treat with OT 27 min)    Co- treatment performed due to patient's multiple deficits requiring two skilled therapists to appropriately and safely assess patient's strength and endurance while facilitating functional tasks in addition to accommodating for patient's activity tolerance      Treatment Type: Treatment  PT/PTA: PTA     Number of PTA visits since last PT visit: 1 04/13/2023

## 2023-04-14 PROBLEM — R82.71 BACTERIURIA: Status: ACTIVE | Noted: 2023-04-14

## 2023-04-14 LAB
BASOPHILS # BLD AUTO: 0.05 K/UL (ref 0–0.2)
BASOPHILS NFR BLD: 0.6 % (ref 0–1.9)
DIFFERENTIAL METHOD: ABNORMAL
EOSINOPHIL # BLD AUTO: 0.5 K/UL (ref 0–0.5)
EOSINOPHIL NFR BLD: 5.7 % (ref 0–8)
ERYTHROCYTE [DISTWIDTH] IN BLOOD BY AUTOMATED COUNT: 13.2 % (ref 11.5–14.5)
HCT VFR BLD AUTO: 26.9 % (ref 37–48.5)
HGB BLD-MCNC: 8.3 G/DL (ref 12–16)
IMM GRANULOCYTES # BLD AUTO: 0.11 K/UL (ref 0–0.04)
IMM GRANULOCYTES NFR BLD AUTO: 1.2 % (ref 0–0.5)
LYMPHOCYTES # BLD AUTO: 1.5 K/UL (ref 1–4.8)
LYMPHOCYTES NFR BLD: 16.7 % (ref 18–48)
MCH RBC QN AUTO: 29.7 PG (ref 27–31)
MCHC RBC AUTO-ENTMCNC: 30.9 G/DL (ref 32–36)
MCV RBC AUTO: 96 FL (ref 82–98)
MONOCYTES # BLD AUTO: 1 K/UL (ref 0.3–1)
MONOCYTES NFR BLD: 10.6 % (ref 4–15)
NEUTROPHILS # BLD AUTO: 5.9 K/UL (ref 1.8–7.7)
NEUTROPHILS NFR BLD: 65.2 % (ref 38–73)
NRBC BLD-RTO: 0 /100 WBC
PLATELET # BLD AUTO: 218 K/UL (ref 150–450)
PMV BLD AUTO: 10.7 FL (ref 9.2–12.9)
RBC # BLD AUTO: 2.79 M/UL (ref 4–5.4)
WBC # BLD AUTO: 8.97 K/UL (ref 3.9–12.7)

## 2023-04-14 PROCEDURE — 25000003 PHARM REV CODE 250: Performed by: STUDENT IN AN ORGANIZED HEALTH CARE EDUCATION/TRAINING PROGRAM

## 2023-04-14 PROCEDURE — 63600175 PHARM REV CODE 636 W HCPCS: Performed by: STUDENT IN AN ORGANIZED HEALTH CARE EDUCATION/TRAINING PROGRAM

## 2023-04-14 PROCEDURE — 97535 SELF CARE MNGMENT TRAINING: CPT

## 2023-04-14 PROCEDURE — 97110 THERAPEUTIC EXERCISES: CPT | Mod: CQ

## 2023-04-14 PROCEDURE — 97530 THERAPEUTIC ACTIVITIES: CPT | Mod: CQ

## 2023-04-14 PROCEDURE — 25000003 PHARM REV CODE 250: Performed by: HOSPITALIST

## 2023-04-14 PROCEDURE — 36415 COLL VENOUS BLD VENIPUNCTURE: CPT | Performed by: HOSPITALIST

## 2023-04-14 PROCEDURE — 97530 THERAPEUTIC ACTIVITIES: CPT

## 2023-04-14 PROCEDURE — 85025 COMPLETE CBC W/AUTO DIFF WBC: CPT | Performed by: HOSPITALIST

## 2023-04-14 PROCEDURE — 11000001 HC ACUTE MED/SURG PRIVATE ROOM

## 2023-04-14 RX ORDER — ACETAMINOPHEN 325 MG/1
650 TABLET ORAL EVERY 4 HOURS PRN
Status: DISCONTINUED | OUTPATIENT
Start: 2023-04-14 | End: 2023-04-19 | Stop reason: HOSPADM

## 2023-04-14 RX ORDER — HYDROCODONE BITARTRATE AND ACETAMINOPHEN 5; 325 MG/1; MG/1
1 TABLET ORAL EVERY 4 HOURS PRN
Status: DISCONTINUED | OUTPATIENT
Start: 2023-04-14 | End: 2023-04-19 | Stop reason: HOSPADM

## 2023-04-14 RX ORDER — OXYCODONE AND ACETAMINOPHEN 5; 325 MG/1; MG/1
1 TABLET ORAL DAILY
Status: DISCONTINUED | OUTPATIENT
Start: 2023-04-14 | End: 2023-04-18

## 2023-04-14 RX ORDER — CIPROFLOXACIN 500 MG/1
500 TABLET ORAL EVERY 12 HOURS
Status: DISCONTINUED | OUTPATIENT
Start: 2023-04-14 | End: 2023-04-18

## 2023-04-14 RX ADMIN — OXYCODONE AND ACETAMINOPHEN 1 TABLET: 5; 325 TABLET ORAL at 12:04

## 2023-04-14 RX ADMIN — CIPROFLOXACIN 500 MG: 500 TABLET, FILM COATED ORAL at 10:04

## 2023-04-14 RX ADMIN — LEVOTHYROXINE SODIUM 75 MCG: 75 TABLET ORAL at 05:04

## 2023-04-14 RX ADMIN — Medication 324 MG: at 04:04

## 2023-04-14 RX ADMIN — CLONIDINE HYDROCHLORIDE 0.1 MG: 0.1 TABLET ORAL at 08:04

## 2023-04-14 RX ADMIN — HYDROCODONE BITARTRATE AND ACETAMINOPHEN 1 TABLET: 5; 325 TABLET ORAL at 08:04

## 2023-04-14 RX ADMIN — POLYETHYLENE GLYCOL 3350 17 G: 17 POWDER, FOR SOLUTION ORAL at 08:04

## 2023-04-14 RX ADMIN — HEPARIN SODIUM 5000 UNITS: 5000 INJECTION INTRAVENOUS; SUBCUTANEOUS at 05:04

## 2023-04-14 RX ADMIN — CLONIDINE HYDROCHLORIDE 0.1 MG: 0.1 TABLET ORAL at 09:04

## 2023-04-14 RX ADMIN — CIPROFLOXACIN 500 MG: 500 TABLET, FILM COATED ORAL at 09:04

## 2023-04-14 RX ADMIN — HEPARIN SODIUM 5000 UNITS: 5000 INJECTION INTRAVENOUS; SUBCUTANEOUS at 09:04

## 2023-04-14 RX ADMIN — LISINOPRIL 20 MG: 20 TABLET ORAL at 08:04

## 2023-04-14 RX ADMIN — PANTOPRAZOLE SODIUM 40 MG: 40 TABLET, DELAYED RELEASE ORAL at 08:04

## 2023-04-14 RX ADMIN — HEPARIN SODIUM 5000 UNITS: 5000 INJECTION INTRAVENOUS; SUBCUTANEOUS at 01:04

## 2023-04-14 RX ADMIN — Medication 324 MG: at 08:04

## 2023-04-14 NOTE — PT/OT/SLP PROGRESS
"Occupational Therapy   Treatment    Name: Daniela Harris  MRN: [id number]  Admitting Diagnosis:  Closed fracture of neck of left femur  4 Days Post-Op    Recommendations:     Discharge Recommendations: nursing facility, skilled  Discharge Equipment Recommendations:   (TBD at SNF)  Barriers to discharge:   (s/p hip fx, required total assist with self care and functional mobility)    Assessment:     Daniela Harris is a 83 y.o. female with a medical diagnosis of Closed fracture of neck of left femur.   Performance deficits affecting function are weakness, impaired endurance, impaired self care skills, impaired functional mobility, gait instability, impaired balance, decreased coordination, decreased upper extremity function, impaired cognition, decreased lower extremity function, pain, decreased safety awareness, decreased ROM, impaired skin, orthopedic precautions.   The patient was initially sleeping but easily aroused with VC and sternal rub. The patient did not follow commands to participate in self care tasks. The patient tolerated sitting on the EOB ~20-25 min  with Dep<>SBA/CGA for balance. The patient demo improved tolerance of weight shifting, rocking motion on the EOB to improve sitting balance. OT will continue as planned.     Rehab Prognosis:  Fair; patient would benefit from acute skilled OT services to address these deficits and reach maximum level of function.       Plan:     Patient to be seen 5 x/week to address the above listed problems via self-care/home management, therapeutic activities, therapeutic exercises  Plan of Care Expires: 04/25/23  Plan of Care Reviewed with: patient    Subjective     Chief Complaint: says "no" with attempts to stand  Patient/Family Comments/goals: unable to state  Pain/Comfort:  Pain Rating 1:  (unable to state, grimaces with movement)  Location - Side 1: Left  Location 1: leg  Pain Addressed 1: Pre-medicate for activity, Reposition, Distraction, Cessation of Activity, " "Nurse notified (pain meds received prior to OT)    Objective:     Communicated with: nurseDeanna prior to session.  Patient found right sidelying with pressure relief boots, SCD, PureWick, bed alarm (Avasys) upon OT entry to room.    General Precautions: Standard, fall    Orthopedic Precautions:LLE weight bearing as tolerated  Braces: N/A  Respiratory Status: Room air     Occupational Performance:     Bed Mobility:    Patient completed Rolling/Turning to Left with  dependent and 2 persons  Patient completed Rolling/Turning to Right with dependent and 2 persons  Patient completed Scooting/Bridging with total assistance, 2 persons, and draw sheet lift to the top of the abed and for anterior scoot  Patient completed Supine to Sit with dependent and 2 persons  Patient completed Sit to Supine with dependent and 2 persons     Functional Mobility/Transfers:  Patient completed Sit <> Stand Transfer with dependence and of 2 persons  with  no assistive device, hand-held assist, and rolling walker   Functional Mobility: The patient attempted to stand x1 with HHA and x2 using the RW. The patient was dependent x2 person with each trial and was unable to achieve full stand. The patient with posterior lean and resists attempts to facilitate upright posture.    Activities of Daily Living:  Grooming: dependence The patient did not follow commands to wipe her hands or face with a warm cloth. The patient resisted attempts to bring the cloth to her face and shook her head "no".  Upper Body Dressing: dependence    Lower Body Dressing: dependence        [facility] 6 Click ADL: 6    Treatment & Education:  Participated in self care and functional mobility as noted above  Sat on the EOB ~20 min with dependent <>SBA/CGA for sitting balance . The patient tolerated gentle rocking posterior/anterior and left right to to allow increased weight bearing to the left hip and improve sitting balance on the EOB. The patient was dependent with trunk " support to prevent posterior lean by OT while PT positioned/stabilized BLE with feet on the floor    Patient left right sidelying with all lines intact, call button in reach, bed alarm on, nurseDeanna notified, Avasys present, and pressure relief boots and wedge in place , pillow between knees    GOALS:   Multidisciplinary Problems       Occupational Therapy Goals          Problem: Occupational Therapy    Goal Priority Disciplines Outcome Interventions   Occupational Therapy Goal     OT, PT/OT Ongoing, Progressing    Description: Goals to be met by: 04/25/23     Patient will increase functional independence with ADLs by performing:    Feeding with Minimal Assistance (finger foods).  Patient will stand statically for 2 min with Minimal assistance to assist with toileting for hygiene and clothing management.   Sitting at edge of bed x15 minutes with Supervision.  Supine to sit with Minimal Assistance.  Step transfer with Minimal Assistance  Toilet transfer to bedside commode with Minimal Assistance.  Upper extremity exercise program x15 reps per handout, with assistance as needed.                         Time Tracking:     OT Date of Treatment: 04/14/23  OT Start Time: 0912  OT Stop Time: 0950  OT Total Time (min): 38 min    Billable Minutes:Self Care/Home Management 15  Therapeutic Activity 23  Total Time 38 (with PT)    OT/MAGGY: OT     Number of MAGGY visits since last OT visit: 0    4/14/2023

## 2023-04-14 NOTE — PLAN OF CARE
Problem: Physical Therapy  Goal: Physical Therapy Goal  Description: Goals to be met by: 23     Patient will increase functional independence with mobility by performin. Supine to sit with Moderate Assistance  2. Rolling to Left and Right with Moderate Assistance  3. Sit to stand transfer with Moderate Assistance using RW  4. Bed to chair transfer with Moderate Assistance   5. Gait ~20-30 feet with Moderate Assistance using Rolling Walker   6. Wheelchair propulsion ~ feet with Minimal Assistance using bilateral upper extremities  7. Lower extremity exercise program x10 reps per handout, with assistance as needed    Outcome: Ongoing, Progressing

## 2023-04-14 NOTE — NURSING
[facility], [facility]  Nurses Note -- 4 Eyes      4/14/2023       Skin assessed on: Q Shift      [x] No Pressure Injuries Present    [x]Prevention Measures Documented    [] Yes LDA  for Pressure Injury Previously documented     [] Yes New Pressure Injury Discovered   [] LDA for New Pressure Injury Added      Attending RN:  Luis M Lockhart RN     Second RN:  Deanna Piedra RN

## 2023-04-14 NOTE — PT/OT/SLP PROGRESS
Physical Therapy Treatment    Patient Name:  Daniela Harris   MRN:  [id number]    Recommendations:     Discharge Recommendations: nursing facility, skilled  Discharge Equipment Recommendations:  (TBD)  Barriers to discharge:  dementia, Dependence of 2 persons for Bed Mobility, high fall risk     Assessment:     Daniela Harris is a 83 y.o. female admitted with a medical diagnosis of Closed fracture of neck of left femur.  She presents with the following impairments/functional limitations: weakness, impaired endurance, impaired functional mobility, gait instability, impaired balance, decreased coordination, impaired cognition, decreased upper extremity function, decreased lower extremity function, decreased safety awareness, pain, decreased ROM, impaired skin, edema, orthopedic precautions.    Rehab Prognosis: Fair; patient would benefit from acute skilled PT services to address these deficits and reach maximum level of function.    Recent Surgery: Procedure(s) (LRB):  ORIF, HIP PINNING SYTHNES (Left) 4 Days Post-Op    Plan:     During this hospitalization, patient to be seen 6 x/week to address the identified rehab impairments via gait training, therapeutic activities, therapeutic exercises, wheelchair management/training and progress toward the following goals:    Plan of Care Expires:  04/25/23    Subjective     Chief Complaint: n/a  Patient/Family Comments/goals: n/a  Pain/Comfort:  Pain Rating 1:  (unable to state, grimaces with movement)  Location - Side 1: Left  Location 1: leg  Pain Addressed 1: Pre-medicate for activity, Reposition, Distraction, Cessation of Activity, Nurse notified      Objective:     Communicated with Nurse Deanna GÓMEZ prior to session.  Patient found HOB elevated with pressure relief boots, SCD, PureWick, bed alarm ([employer]) upon PT entry to room.     General Precautions: Standard, fall  Orthopedic Precautions: LLE weight bearing as tolerated  Braces: N/A  Respiratory Status: Room air      Functional Mobility:  Bed Mobility:     Scooting: anterior scoot to EOB with dependence and of 2 persons  Bridging: toward HOB with dependence and of 2 persons  Supine to Sit: dependence and of 2 persons; Pt with Max posterior and R side leaning 2* still afraid to put weight through her L side; required Max A for posterior and R side support initially, however, pt was able to hold herself up in sitting with no support after performing fwd/bwd and side to side rocking ex with PTA/OT  Sit to Supine: dependence and of 2 persons  Transfers:   Gait belt and gripped socks don prior standing activity  Sit to Stand:  2 attempts from EOB with dependence and of 2 persons; 1st with no AD, however, didn't achieve  a full stand 2* pt with increased posterior leaning, pt was trying to reach/grab onto the RW; 2nd attempt with RW, however, pt still with Max posterior leaning and pushing away from RW to get back to bed   Balance: Fair Sitting      AM-PAC 6 CLICK MOBILITY  Turning over in bed (including adjusting bedclothes, sheets and blankets)?: 2  Sitting down on and standing up from a chair with arms (e.g., wheelchair, bedside commode, etc.): 1  Moving from lying on back to sitting on the side of the bed?: 2  Moving to and from a bed to a chair (including a wheelchair)?: 1  Need to walk in hospital room?: 1  Climbing 3-5 steps with a railing?: 1  Basic Mobility Total Score: 8       Treatment & Education:  Pt mary ~20 min in sitting at EOB: Pt with Max posterior and R side leaning 2* still afraid to put weight through her L side; required Max A for posterior and R side support initially, however, pt was able to hold herself up in sitting with no support (RUE hold on BR) after performing fwd/bwd and side to side rocking ex with PTA/OT  Fwd/Bwd rocking 10 reps  Side to side rocking 10 reps  BLE PROM in seated at EOB 10 reps AP, LAQ    Patient left HOB elevated with L side offloaded by wedge, tray table near by, B pressure relief  boots, B SCD, pillow under RUE, pillow between knees, all lines intact, call button in reach, bed alarm on, and nurse notified.    GOALS:   Multidisciplinary Problems       Physical Therapy Goals          Problem: Physical Therapy    Goal Priority Disciplines Outcome Goal Variances Interventions   Physical Therapy Goal     PT, PT/OT Ongoing, Progressing     Description: Goals to be met by: 23     Patient will increase functional independence with mobility by performin. Supine to sit with Moderate Assistance  2. Rolling to Left and Right with Moderate Assistance  3. Sit to stand transfer with Moderate Assistance using RW  4. Bed to chair transfer with Moderate Assistance   5. Gait ~20-30 feet with Moderate Assistance using Rolling Walker   6. Wheelchair propulsion ~ feet with Minimal Assistance using bilateral upper extremities  7. Lower extremity exercise program x10 reps per handout, with assistance as needed                         Time Tracking:     PT Received On: 23  PT Start Time: 912     PT Stop Time: 0950  PT Total Time (min): 38 min     Billable Minutes: Therapeutic Activity 25 and Therapeutic Exercise 13 min (Co-treat with OT 38 min)    Co- treatment performed due to patient's multiple deficits requiring two skilled therapists to appropriately and safely assess patient's strength and endurance while facilitating functional tasks in addition to accommodating for patient's activity tolerance      Treatment Type: Treatment  PT/PTA: PTA     Number of PTA visits since last PT visit: 2     2023

## 2023-04-14 NOTE — PROGRESS NOTES
Orthopedic Surgery Progress Note    Subjective:  No acute issues. Appears comfortable. Nods when asked if she is feeling well.     Objective:  Vital signs in last 24 hours:  Temp:  [97.7 °F (36.5 °C)-100.5 °F (38.1 °C)] 100.5 °F (38.1 °C)  Pulse:  [66-94] 90  Resp:  [16-22] 16  SpO2:  [91 %-95 %] 93 %  BP: (107-139)/(53-76) 139/76    Physical Exam:   NAD, dementia but nods in response, nonlabored respirations. L hip dressings c/d/i. No significant swelling. Does not react in much pain. Moves foot spontaneously. Toes warm and well perfused.     Data Review  CBC:   Lab Results   Component Value Date    WBC 10.75 04/10/2023    RBC 3.39 (L) 04/10/2023    HGB 10.1 (L) 04/10/2023    HCT 32.0 (L) 04/10/2023     04/10/2023       Assessment/Plan: s/p L hip ORIF  - WBAT LLE. PT/OT.   - Pending SNF placement.     Britt Garcia MD  Orthopedics  [facility]

## 2023-04-14 NOTE — PLAN OF CARE
JEFF called patient's daughter in law, Vickie to discuss discharge planning. JEFF notified Vickie that patient is medically stable and does not have any accepting facilities. JEFF informed Vickie that several phone calls have been made to follow up on the referral to [facility]. JEFF explained that patient is total assist which will be a barrier to placement. Vickie inquired about the next options. JEFF informed Vickie that patient can go to a nursing home and explained that the assisted living facility that she lives in now may not be able to accommodate her being total assist. Vickie inquired if  could call [facility] and get a final answer from them before she talks to her family about options.     1:30 pm     JEFF spoke with CLAU Skelton at [facility] and inquired if she reviewed the referral. Nafisa stated that she had not got the chance to look at the referral. Nafisa said that she will review and call JEFF back.     2:24 pm     Received a call from Swapna [facility]. Swapna called JEFF for clarification on discharge plan. Swapna stated that patient's son called and said that patient was being discharged. JEFF informed Swapna that daughter in law was informed that patient was medically stable. JEFF also explained that patient is not total assist which may be a barrier to placement. Swapna stated that she and the family are both confused because patient was ambulating with RW prior to hospitalization. JEFF informed Swapna that a list of facilities and sitter services were emailed to Vickie. JEFF informed that she spoke to Vickie about options that included retirement nursing home placement. Swapna inquired about where the referrals were sent. Swapna stated that most of their patients go to [facility] in [place]. JEFF stated that family gave a list of preferred places. JEFF informed Swapna that [facility] is reviewing referral. Swapna inquired if SW could keep her updated on discharge plan.      2:52  pm    Received a call from Swapna at [facility]. Swapna stated that she spoke with son Jayant and he stated that he thought he added [facility] to the list. SW sent referral to [facility]. JEFF sent referral to 3 additional facilities as a plan B.

## 2023-04-14 NOTE — OP NOTE
[facility] [place]  Orthopedic Operative Note           Surgery Date: 4/10/2023      Surgeon(s) and Role:     * Britt Garcia MD - Primary     Assisting Surgeon: None     Pre-op Diagnosis:  Closed fracture of left hip, initial encounter [S72.002A]     Post-op Diagnosis:  Post-Op Diagnosis Codes:     * Closed fracture of left hip, initial encounter [S72.002A]     Procedure(s) (LRB):  ORIF, HIP OPEN REDUCTION INTERNAL FIXATION (Left)     Anesthesia: General     Operative Findings: Valgus impacted femoral neck fracture     Estimated Blood Loss: 150cc    Complications: None    Indications: Patient is an 83 year old female with dementia who sustained a fall in her living facility. A left femoral neck fracture was identified. Surgical fixation was recommended and discussed with patient's son. All risks, benefits, and alternatives to surgery were discussed with the son.  Risks of surgery include but are not limited to bleeding, infection, nerve or vessel injury, pain, stiffness, malunion or nonunion, hardware complication, functional limitation, need for additional procedures, complications associated with anesthesia or underlying medical conditions, and death.  All questions were answered and the son consented to proceed with surgery on the patient's behalf due to dementia.    Technique: The patient was seen and identified in the preoperative holding area. The correct operative site was verified and marked. The patient was brought to the operating room and anesthesia was induced without complication. The patient was then positioned supine on the fracture table with all bony prominences well padded. The left lower extremity was prepped and draped in a sterile fashion. A timeout was performed identifying the correct patient, correct procedure, and correct operative site.     An incision was made just distal to the greater trochanter and we incised through the deep fascia and muscle to bone. The guide pin was inserted  under imaging and advanced into the bone to the center center position in the femoral head. The jig was then inserted over the wire and measured and drilled for the appropriate length implant. The implant was inserted and the guide was used to place the two distal interlocking screws, followed by the derotational screw into the head. We had good fixation. Final images were taken and we were satisfied with fracture reduction and hardware positioning. The wound was irrigated with normal saline. Hemostasis was achieved with electrocautery. The wound was closed in a layered fashion with vicryl for deep fascia and subcutaneously and staples for skin. An Aquacel dressing was placed.     The patient tolerated the procedure well. All counts were correct prior to closure and at the conclusion of the procedure.     Disposition: extubated, taken to the PACU in stable condition    Post-Operative Instructions: Patient may progress to weight bearing as tolerated with the assistance of physical therapy.

## 2023-04-14 NOTE — PLAN OF CARE
Problem: Adult Inpatient Plan of Care  Goal: Plan of Care Review  Outcome: Ongoing, Progressing  Flowsheets (Taken 4/14/2023 1705)  Plan of Care Reviewed With: caregiver  Goal: Patient-Specific Goal (Individualized)  Outcome: Ongoing, Progressing  Goal: Absence of Hospital-Acquired Illness or Injury  Outcome: Ongoing, Progressing  Intervention: Identify and Manage Fall Risk  Flowsheets (Taken 4/14/2023 1705)  Safety Promotion/Fall Prevention:   assistive device/personal item within reach   side rails raised x 2  Intervention: Prevent Skin Injury  Flowsheets (Taken 4/14/2023 1705)  Body Position:   position maintained   log-rolled  Skin Protection: adhesive use limited  Intervention: Prevent and Manage VTE (Venous Thromboembolism) Risk  Flowsheets (Taken 4/14/2023 1705)  Activity Management:   Ankle pumps - L1   Arm raise - L1   Rolling - L1  VTE Prevention/Management: remove, assess skin, and reapply sequential compression device  Range of Motion: ROM (range of motion) performed  Intervention: Prevent Infection  Flowsheets (Taken 4/14/2023 1709)  Infection Prevention: hand hygiene promoted  Goal: Optimal Comfort and Wellbeing  Outcome: Ongoing, Progressing  Intervention: Monitor Pain and Promote Comfort  Flowsheets (Taken 4/14/2023 1709)  Pain Management Interventions:   around-the-clock dosing utilized   relaxation techniques promoted   quiet environment facilitated  Goal: Readiness for Transition of Care  Outcome: Ongoing, Progressing     Problem: Fall Injury Risk  Goal: Absence of Fall and Fall-Related Injury  Outcome: Ongoing, Progressing  Intervention: Identify and Manage Contributors  Flowsheets (Taken 4/14/2023 1709)  Medication Review/Management:   medications reviewed   high-risk medications identified  Intervention: Promote Injury-Free Environment  Flowsheets (Taken 4/14/2023 1709)  Safety Promotion/Fall Prevention:   assistive device/personal item within reach   side rails raised x 2     Problem: Skin  Injury Risk Increased  Goal: Skin Health and Integrity  Outcome: Ongoing, Progressing  Intervention: Optimize Skin Protection  Flowsheets (Taken 4/14/2023 1709)  Pressure Reduction Techniques:   heels elevated off bed   pressure points protected  Skin Protection: adhesive use limited  Head of Bed (HOB) Positioning: HOB at 45 degrees

## 2023-04-14 NOTE — PLAN OF CARE
Patient did not appear to sleep well last night. Pain medication offered but patient refused, did not appear to be in significant discomfort. Skin integrity maintained and promoted.    Problem: Adult Inpatient Plan of Care  Goal: Absence of Hospital-Acquired Illness or Injury  Outcome: Ongoing, Progressing     Problem: Fall Injury Risk  Goal: Absence of Fall and Fall-Related Injury  Outcome: Ongoing, Progressing     Problem: Skin Injury Risk Increased  Goal: Skin Health and Integrity  Outcome: Ongoing, Progressing

## 2023-04-14 NOTE — ASSESSMENT & PLAN NOTE
Initially held off ABx's.  Low grade fever overnight.  Will treat with short course of oral Cipro.

## 2023-04-14 NOTE — SUBJECTIVE & OBJECTIVE
Interval History: no issues this morning.    Review of Systems   Unable to perform ROS: Dementia   Objective:     Vital Signs (Most Recent):  Temp: 98.8 °F (37.1 °C) (04/14/23 1058)  Pulse: 69 (04/14/23 1058)  Resp: 18 (04/14/23 1214)  BP: (!) 104/57 (04/14/23 1058)  SpO2: (!) 93 % (04/14/23 1058)   Vital Signs (24h Range):  Temp:  [98.1 °F (36.7 °C)-100.5 °F (38.1 °C)] 98.8 °F (37.1 °C)  Pulse:  [69-94] 69  Resp:  [14-18] 18  SpO2:  [92 %-94 %] 93 %  BP: (104-175)/(57-76) 104/57     Weight: 81 kg (178 lb 9.2 oz)  Body mass index is 25.62 kg/m².    Intake/Output Summary (Last 24 hours) at 4/14/2023 1512  Last data filed at 4/14/2023 1308  Gross per 24 hour   Intake 720 ml   Output 1450 ml   Net -730 ml        Physical Exam  Constitutional:       General: She is not in acute distress.     Appearance: She is not toxic-appearing or diaphoretic.   HENT:      Mouth/Throat:      Pharynx: Oropharynx is clear. No oropharyngeal exudate or posterior oropharyngeal erythema.   Cardiovascular:      Rate and Rhythm: Normal rate and regular rhythm.      Heart sounds: No murmur heard.    No gallop.   Pulmonary:      Effort: Pulmonary effort is normal. No respiratory distress.      Breath sounds: Normal breath sounds. No wheezing.   Abdominal:      General: Bowel sounds are normal. There is no distension.      Palpations: Abdomen is soft.      Tenderness: There is no abdominal tenderness.   Neurological:      Comments: Awake and alert.  Disoriented        Significant Labs: All pertinent labs within the past 24 hours have been reviewed.  BMP:   No results for input(s): GLU, NA, K, CL, CO2, BUN, CREATININE, CALCIUM, MG in the last 48 hours.    CBC:   Recent Labs   Lab 04/14/23  0407   WBC 8.97   HGB 8.3*   HCT 26.9*            Significant Imaging: I have reviewed all pertinent imaging results/findings within the past 24 hours.

## 2023-04-14 NOTE — PROGRESS NOTES
[facility]  Progress Note    Patient Name: Daniela Harris  MRN: [id number]  Patient Class: IP- Inpatient   Admission Date: 4/9/2023  Length of Stay: 5 days  Attending Physician: Kenji Deras MD  Primary Care Provider: Tayo Varela MD        Subjective:     Principal Problem:Closed fracture of neck of left femur        HPI:  This is an 83-year-old female with a past medical history of Alzheimer's dementia, hypertension, hyperlipidemia, CKD 4, hypothyroidism, PUD, who presents after a fall.    Patient had an unwitnessed fall the morning of presentation while she was at her nursing home.  Per the patient's son, Jayant, she was left sitting on the edge of the bed and later fell down to the floor, unknown if she hit her head.  She uses a walker to ambulate at baseline.  Patient has a history of dementia and is unable to contribute to the history.    In the ED, the patient was hypertensive.  Labs remarkable for leukocytosis (13.4), normocytic anemia (10.7-baseline 11-12), elevated creatinine (1.6-around baseline).  UA showed +3 leukocytes, > 100 WBCs, and many bacteria.  CT head/cervical spine showed no acute process.  Left CT hip showed a transcervical femoral neck fracture.  The patient was admitted for further management.      Overview/Hospital Course:  82 y/o female admitted with hip fracture.  Ortho consulted and underwent ORIF on 4/10.  PT/OT consulted.  Recommending SNF.  SW/CM consulted.      Interval History: no issues this morning.    Review of Systems   Unable to perform ROS: Dementia   Objective:     Vital Signs (Most Recent):  Temp: 98.8 °F (37.1 °C) (04/14/23 1058)  Pulse: 69 (04/14/23 1058)  Resp: 18 (04/14/23 1214)  BP: (!) 104/57 (04/14/23 1058)  SpO2: (!) 93 % (04/14/23 1058)   Vital Signs (24h Range):  Temp:  [98.1 °F (36.7 °C)-100.5 °F (38.1 °C)] 98.8 °F (37.1 °C)  Pulse:  [69-94] 69  Resp:  [14-18] 18  SpO2:  [92 %-94 %] 93 %  BP: (104-175)/(57-76) 104/57      Weight: 81 kg (178 lb 9.2 oz)  Body mass index is 25.62 kg/m².    Intake/Output Summary (Last 24 hours) at 4/14/2023 1512  Last data filed at 4/14/2023 1308  Gross per 24 hour   Intake 720 ml   Output 1450 ml   Net -730 ml        Physical Exam  Constitutional:       General: She is not in acute distress.     Appearance: She is not toxic-appearing or diaphoretic.   HENT:      Mouth/Throat:      Pharynx: Oropharynx is clear. No oropharyngeal exudate or posterior oropharyngeal erythema.   Cardiovascular:      Rate and Rhythm: Normal rate and regular rhythm.      Heart sounds: No murmur heard.    No gallop.   Pulmonary:      Effort: Pulmonary effort is normal. No respiratory distress.      Breath sounds: Normal breath sounds. No wheezing.   Abdominal:      General: Bowel sounds are normal. There is no distension.      Palpations: Abdomen is soft.      Tenderness: There is no abdominal tenderness.   Neurological:      Comments: Awake and alert.  Disoriented        Significant Labs: All pertinent labs within the past 24 hours have been reviewed.  BMP:   No results for input(s): GLU, NA, K, CL, CO2, BUN, CREATININE, CALCIUM, MG in the last 48 hours.    CBC:   Recent Labs   Lab 04/14/23  0407   WBC 8.97   HGB 8.3*   HCT 26.9*            Significant Imaging: I have reviewed all pertinent imaging results/findings within the past 24 hours.      Assessment/Plan:      * Closed fracture of neck of left femur, non displaced   Presented after an unwitnessed fall.    Left CT hip showed a transcervical femoral neck fracture.  Plan:   Orthopedic surgery consulted.  S/P ORIF on 4/10  Pain control   DVT prophylaxis   PT/OT consult.  Recommending SNF.  CM/SW consulted.  Discharge to SNF once arranged.    Bacteriuria  Initially held off ABx's.  Low grade fever overnight.  Will treat with short course of oral Cipro.      CKD (chronic kidney disease) stage 4  At baseline.  Continue to monitor.      Late onset Alzheimer's disease  without behavioral disturbance  No acute issues.      Hypothyroidism  Continue synthroid.      Hypertension  Resume home meds      Hyperlipidemia  Outpatient follow-up.        VTE Risk Mitigation (From admission, onward)         Ordered     heparin (porcine) injection 5,000 Units  Every 8 hours         04/09/23 1911     IP VTE HIGH RISK PATIENT  Once         04/09/23 1911     Place sequential compression device  Until discontinued         04/09/23 1911                Discharge Planning   ARNEL:      Code Status: Full Code   Is the patient medically ready for discharge?: Yes    Reason for patient still in hospital (select all that apply): Pending disposition  Discharge Plan A: Skilled Nursing Facility   Discharge Delays: (!) Post-Acute Set-up              Kenji Deras MD  Department of Hospital Medicine   [facility]

## 2023-04-14 NOTE — PLAN OF CARE
Problem: Adult Inpatient Plan of Care  Goal: Plan of Care Review  4/14/2023 1724 by Deanna Piedra LPN  Outcome: Ongoing, Progressing  Flowsheets (Taken 4/14/2023 1724)  Plan of Care Reviewed With: patient  4/14/2023 1724 by Deanna Piedra LPN  Outcome: Ongoing, Progressing  Goal: Patient-Specific Goal (Individualized)  4/14/2023 1724 by Deanna Piedra LPN  Outcome: Ongoing, Progressing  4/14/2023 1724 by Deanna Piedra LPN  Outcome: Ongoing, Progressing  Goal: Absence of Hospital-Acquired Illness or Injury  4/14/2023 1724 by Deanna Piedra LPN  Outcome: Ongoing, Progressing  4/14/2023 1724 by Deanna Piedra LPN  Outcome: Ongoing, Progressing  Intervention: Identify and Manage Fall Risk  Flowsheets (Taken 4/14/2023 1724)  Safety Promotion/Fall Prevention:   assistive device/personal item within reach   Fall Risk reviewed with patient/family   high risk medications identified   medications reviewed   nonskid shoes/socks when out of bed   room near unit station   side rails raised x 2   instructed to call staff for mobility  Intervention: Prevent Skin Injury  Flowsheets (Taken 4/14/2023 1724)  Body Position:   position changed independently   weight shifting  Skin Protection:   adhesive use limited   tubing/devices free from skin contact  Intervention: Prevent and Manage VTE (Venous Thromboembolism) Risk  Flowsheets (Taken 4/14/2023 1724)  Activity Management: Rolling - L1  VTE Prevention/Management:   ambulation promoted   bleeding precations maintained   bleeding risk assessed  Range of Motion: active ROM (range of motion) encouraged  Intervention: Prevent Infection  Flowsheets (Taken 4/14/2023 1724)  Infection Prevention: hand hygiene promoted  Goal: Optimal Comfort and Wellbeing  4/14/2023 1724 by Deanna Piedra LPN  Outcome: Ongoing, Progressing  4/14/2023 1724 by Deanna Piedra LPN  Outcome: Ongoing, Progressing  Goal: Readiness for Transition of Care  4/14/2023 1724 by Deanna Piedra LPN  Outcome: Ongoing,  Progressing  4/14/2023 1724 by Deanna Piedra LPN  Outcome: Ongoing, Progressing     Problem: Fall Injury Risk  Goal: Absence of Fall and Fall-Related Injury  4/14/2023 1724 by Deanna Piedra LPN  Outcome: Ongoing, Progressing  4/14/2023 1724 by Deanna Piedra LPN  Outcome: Ongoing, Progressing  Intervention: Identify and Manage Contributors  Flowsheets (Taken 4/14/2023 1724)  Self-Care Promotion:   independence encouraged   BADL personal objects within reach   BADL personal routines maintained   safe use of adaptive equipment encouraged   meal set-up provided  Medication Review/Management:   medications reviewed   high-risk medications identified  Intervention: Promote Injury-Free Environment  Flowsheets (Taken 4/14/2023 1724)  Safety Promotion/Fall Prevention:   assistive device/personal item within reach   Fall Risk reviewed with patient/family   high risk medications identified   medications reviewed   nonskid shoes/socks when out of bed   room near unit station   side rails raised x 2   instructed to call staff for mobility     Problem: Skin Injury Risk Increased  Goal: Skin Health and Integrity  4/14/2023 1724 by Deanna Piedra LPN  Outcome: Ongoing, Progressing  4/14/2023 1724 by Deanna Piedra LPN  Outcome: Ongoing, Progressing  Intervention: Optimize Skin Protection  Flowsheets (Taken 4/14/2023 1724)  Pressure Reduction Techniques:   frequent weight shift encouraged   pressure points protected   heels elevated off bed   weight shift assistance provided  Skin Protection:   adhesive use limited   tubing/devices free from skin contact  Head of Bed (HOB) Positioning: HOB at 30-45 degrees   Pt alert able to make needs known,mary meds well,abx remains in progress,no s/s adverse reaction noted,reposition self q 2hrs,pain controlled by prn pain medication,POC explained,remains free from falls and pressure injuries,safety maintained,continue monitoring.

## 2023-04-14 NOTE — NURSING
[facility], [facility]  Nurses Note -- 4 Eyes      4/14/2023       Skin assessed on: Q Shift      [x] No Pressure Injuries Present    []Prevention Measures Documented  Pt has left hip surgical incision on LDA  [] Yes LDA  for Pressure Injury Previously documented     [] Yes New Pressure Injury Discovered   [] LDA for New Pressure Injury Added      Attending RN:  Deanna Piedra LPN     Second RN:  Darleen.[email or web address] BULLOCK

## 2023-04-15 PROCEDURE — 25000003 PHARM REV CODE 250: Performed by: STUDENT IN AN ORGANIZED HEALTH CARE EDUCATION/TRAINING PROGRAM

## 2023-04-15 PROCEDURE — 11000001 HC ACUTE MED/SURG PRIVATE ROOM

## 2023-04-15 PROCEDURE — 97110 THERAPEUTIC EXERCISES: CPT | Mod: CQ

## 2023-04-15 PROCEDURE — 63600175 PHARM REV CODE 636 W HCPCS: Performed by: STUDENT IN AN ORGANIZED HEALTH CARE EDUCATION/TRAINING PROGRAM

## 2023-04-15 PROCEDURE — 25000003 PHARM REV CODE 250: Performed by: HOSPITALIST

## 2023-04-15 PROCEDURE — 97530 THERAPEUTIC ACTIVITIES: CPT | Mod: CQ

## 2023-04-15 RX ADMIN — PANTOPRAZOLE SODIUM 40 MG: 40 TABLET, DELAYED RELEASE ORAL at 09:04

## 2023-04-15 RX ADMIN — Medication 324 MG: at 09:04

## 2023-04-15 RX ADMIN — LISINOPRIL 20 MG: 20 TABLET ORAL at 09:04

## 2023-04-15 RX ADMIN — HEPARIN SODIUM 5000 UNITS: 5000 INJECTION INTRAVENOUS; SUBCUTANEOUS at 09:04

## 2023-04-15 RX ADMIN — LEVOTHYROXINE SODIUM 75 MCG: 75 TABLET ORAL at 05:04

## 2023-04-15 RX ADMIN — CIPROFLOXACIN 500 MG: 500 TABLET, FILM COATED ORAL at 09:04

## 2023-04-15 RX ADMIN — POLYETHYLENE GLYCOL 3350 17 G: 17 POWDER, FOR SOLUTION ORAL at 09:04

## 2023-04-15 RX ADMIN — CLONIDINE HYDROCHLORIDE 0.1 MG: 0.1 TABLET ORAL at 09:04

## 2023-04-15 RX ADMIN — HEPARIN SODIUM 5000 UNITS: 5000 INJECTION INTRAVENOUS; SUBCUTANEOUS at 05:04

## 2023-04-15 RX ADMIN — Medication 324 MG: at 04:04

## 2023-04-15 RX ADMIN — HEPARIN SODIUM 5000 UNITS: 5000 INJECTION INTRAVENOUS; SUBCUTANEOUS at 04:04

## 2023-04-15 RX ADMIN — OXYCODONE AND ACETAMINOPHEN 1 TABLET: 5; 325 TABLET ORAL at 09:04

## 2023-04-15 NOTE — PT/OT/SLP PROGRESS
"Physical Therapy Treatment    Patient Name:  Daniela Harris   MRN:  [id number]    Recommendations:     Discharge Recommendations: nursing facility, skilled  Discharge Equipment Recommendations:  (TBD)  Barriers to discharge: None    Assessment:     Daniela Harris is a 83 y.o. female admitted with a medical diagnosis of Closed fracture of neck of left femur.  She presents with the following impairments/functional limitations: weakness, impaired endurance, impaired self care skills, impaired functional mobility, gait instability, decreased lower extremity function, decreased upper extremity function, decreased coordination, impaired balance, decreased safety awareness, impaired cognition, pain, edema, decreased ROM, impaired coordination, orthopedic precautions .    Rehab Prognosis: Fair; patient would benefit from acute skilled PT services to address these deficits and reach maximum level of function.    Recent Surgery: Procedure(s) (LRB):  ORIF, HIP PINNING SYTHNES (Left) 5 Days Post-Op    Plan:     During this hospitalization, patient to be seen 6 x/week to address the identified rehab impairments via gait training, therapeutic activities, therapeutic exercises, wheelchair management/training and progress toward the following goals:    Plan of Care Expires:  04/25/23    Subjective     Chief Complaint: pain   Patient/Family Comments/goals: pt with limited verbal , stated " yes" to therapy activity   Pain/Comfort:  Location - Side 1: Left  Location 1: leg  Pain Addressed 1: Pre-medicate for activity, Reposition, Cessation of Activity, Nurse notified      Objective:     Communicated with nurse  prior to session.  Patient found HOB elevated with pressure relief boots, bed alarm, peripheral IV, PureWick, telemetry, SCD upon PT entry to room.     General Precautions: Standard, fall  Orthopedic Precautions: LLE weight bearing as tolerated  Braces: N/A  Respiratory Status: Room air     Functional Mobility:  Bed Mobility:  pt " still with increased resistance with movements, required dependent A for all functional mobility  .  Rolling Right: dependence  Scooting: dependence  Supine to Sit: dependence, HOB elevated, bedside rail   Sit to Supine: dependence , HOB elevated, bedside rail   Balance:  initially poor sitting balance, pt with severe posterior leaning, pushing back required MAX/Total A to scoot and place B foot on the floor however improved with time and further activity , pt able to maintain static sitting with RUE support on side rail , SBA.       AM-PAC 6 CLICK MOBILITY  Turning over in bed (including adjusting bedclothes, sheets and blankets)?: 2  Sitting down on and standing up from a chair with arms (e.g., wheelchair, bedside commode, etc.): 1  Moving from lying on back to sitting on the side of the bed?: 2  Moving to and from a bed to a chair (including a wheelchair)?: 1  Need to walk in hospital room?: 1  Climbing 3-5 steps with a railing?: 1  Basic Mobility Total Score: 8       Treatment & Education:   Pt tolerated sitting balance EOB x ~ 22 min, performed seated weight shifting B laterally and fwd/bwd  10 reps x 4  trials . Pt required MAX  A for dynamic sitting balance. V/T cues for cores activations to improve balance and trunk stability .   Performed PROM BLE x 20 reps in all available planes while sitting EOB and in bed .      Patient left right sidelying with HOB elevated, SCD / pressure relief boots to BLE , towel roll between knees, BUE elevated (pt hugging her Jace bear) all lines intact, call button in reach, bed alarm on, nurse notified, and [employer] present..    GOALS:   Multidisciplinary Problems       Physical Therapy Goals          Problem: Physical Therapy    Goal Priority Disciplines Outcome Goal Variances Interventions   Physical Therapy Goal     PT, PT/OT Ongoing, Progressing     Description: Goals to be met by: 23     Patient will increase functional independence with mobility by performin.  Supine to sit with Moderate Assistance  2. Rolling to Left and Right with Moderate Assistance  3. Sit to stand transfer with Moderate Assistance using RW  4. Bed to chair transfer with Moderate Assistance   5. Gait ~20-30 feet with Moderate Assistance using Rolling Walker   6. Wheelchair propulsion ~ feet with Minimal Assistance using bilateral upper extremities  7. Lower extremity exercise program x10 reps per handout, with assistance as needed                         Time Tracking:     PT Received On: 04/15/23  PT Start Time: 1005     PT Stop Time: 1044  PT Total Time (min): 39 min     Billable Minutes: Therapeutic Activity 23 and Therapeutic Exercise 16    Treatment Type: Treatment  PT/PTA: PTA     Number of PTA visits since last PT visit: 3     04/15/2023

## 2023-04-15 NOTE — PROGRESS NOTES
The patient is in bed with therapy.  She did not do well and had a difficult time sitting on the side of the bed according to therapy.  She resisted a lot and extended and was not able to stand.     Temp:  [97.6 °F (36.4 °C)-98.8 °F (37.1 °C)] 98.1 °F (36.7 °C)  Pulse:  [65-79] 78  Resp:  [16-20] 17  SpO2:  [93 %-95 %] 94 %  BP: (114-142)/(56-66) 114/56    Physical examination:    Left hip dressing is clean and dry.  The alignment looks good clinically.      No results for input(s): WBC, RBC, HGB, HCT, PLT, MCV, MCH, MCHC in the last 24 hours.      Current Facility-Administered Medications:     acetaminophen tablet 650 mg, 650 mg, Oral, Q4H PRN, Kenji Deras MD    aluminum-magnesium hydroxide-simethicone 200-200-20 mg/5 mL suspension 30 mL, 30 mL, Oral, QID PRN, Silviano Garcia MD    bisacodyL suppository 10 mg, 10 mg, Rectal, Daily PRN, Silviano Garica MD    ciprofloxacin HCl tablet 500 mg, 500 mg, Oral, Q12H, Kenji Deras MD, 500 mg at 04/15/23 0901    cloNIDine tablet 0.1 mg, 0.1 mg, Oral, BID, Silviano Garcia MD, 0.1 mg at 04/15/23 0903    cloNIDine tablet 0.1 mg, 0.1 mg, Oral, Q8H PRN, Kenji Deras MD, 0.1 mg at 04/12/23 1301    dextrose 50% injection 12.5 g, 12.5 g, Intravenous, PRN, Silviano Garcia MD    dextrose 50% injection 25 g, 25 g, Intravenous, PRN, Silviano Garcia MD    ferrous gluconate tablet 324 mg, 324 mg, Oral, BID WM, Silviano Garcia MD, 324 mg at 04/15/23 0900    glucagon (human recombinant) injection 1 mg, 1 mg, Intramuscular, PRN, Silviano Garcia MD    glucose chewable tablet 16 g, 16 g, Oral, PRN, Silviano Garcia MD    glucose chewable tablet 24 g, 24 g, Oral, PRN, Silviano Garcia MD    heparin (porcine) injection 5,000 Units, 5,000 Units, Subcutaneous, Q8H, Silviano Garcia MD, 5,000 Units at 04/15/23 0555    HYDROcodone-acetaminophen 5-325 mg per tablet 1 tablet, 1 tablet, Oral, Q4H PRN, Kenji Deras MD    levothyroxine tablet 75 mcg, 75 mcg, Oral, Before breakfast, Silviano Garcia MD, 75 mcg at  04/15/23 0555    lisinopriL tablet 20 mg, 20 mg, Oral, Daily, Silviano Garcia MD, 20 mg at 04/15/23 0901    magnesium oxide tablet 800 mg, 800 mg, Oral, PRN, Silviano Garcia MD    magnesium oxide tablet 800 mg, 800 mg, Oral, PRN, Silviano Garcia MD    melatonin tablet 6 mg, 6 mg, Oral, Nightly PRN, Silviano Garcia MD, 6 mg at 04/12/23 2235    naloxone 0.4 mg/mL injection 0.02 mg, 0.02 mg, Intravenous, PRN, Silviano Garcia MD    ondansetron injection 4 mg, 4 mg, Intravenous, Q8H PRN, Silviano Garcia MD    oxyCODONE-acetaminophen 5-325 mg per tablet 1 tablet, 1 tablet, Oral, Daily, Kenji Deras MD, 1 tablet at 04/15/23 0901    pantoprazole EC tablet 40 mg, 40 mg, Oral, Daily, Silviano Garcia MD, 40 mg at 04/15/23 0901    polyethylene glycol packet 17 g, 17 g, Oral, Daily, Silviano Garcia MD, 17 g at 04/15/23 0900    potassium bicarbonate disintegrating tablet 35 mEq, 35 mEq, Oral, PRN, Silviano Garcia MD    potassium bicarbonate disintegrating tablet 50 mEq, 50 mEq, Oral, PRN, Silviano Garcia MD    potassium bicarbonate disintegrating tablet 60 mEq, 60 mEq, Oral, PRN, Silviano Garcia MD    potassium, sodium phosphates 280-160-250 mg packet 2 packet, 2 packet, Oral, PRN, Silviano Garcia MD    potassium, sodium phosphates 280-160-250 mg packet 2 packet, 2 packet, Oral, PRN, Silviano Garcia MD    potassium, sodium phosphates 280-160-250 mg packet 2 packet, 2 packet, Oral, PRN, Silviano Garcia MD    prochlorperazine injection Soln 5 mg, 5 mg, Intravenous, Q6H PRN, Silviano Garcia MD    simethicone chewable tablet 80 mg, 1 tablet, Oral, QID PRN, Silviano Garcia MD    sodium chloride 0.9% flush 10 mL, 10 mL, Intravenous, Q12H PRN, Silviano Garcia MD, 10 mL at 04/09/23 2222    Assessment and Plan:    83-year-old female status post fall with left hip femoral neck fracture status post ORIF with femoral neck system.      PT/OT    DVT prophylaxis-heparin, Rocco's, SCDs    Disposition-skilled nursing pending    Ml Hernandez MD  [facility]  [phone]  This  note has been transcribed with voice recognition software, but not reviewed and may contain unrecognized errors.

## 2023-04-15 NOTE — SUBJECTIVE & OBJECTIVE
Interval History: no issues overnight.    Review of Systems   Unable to perform ROS: Dementia   Objective:     Vital Signs (Most Recent):  Temp: 98.1 °F (36.7 °C) (04/15/23 1051)  Pulse: 78 (04/15/23 1051)  Resp: 17 (04/15/23 1051)  BP: (!) 114/56 (04/15/23 1051)  SpO2: (!) 94 % (04/15/23 1051)   Vital Signs (24h Range):  Temp:  [97.6 °F (36.4 °C)-98.8 °F (37.1 °C)] 98.1 °F (36.7 °C)  Pulse:  [65-79] 78  Resp:  [16-20] 17  SpO2:  [93 %-95 %] 94 %  BP: (114-142)/(56-66) 114/56     Weight: 81 kg (178 lb 9.2 oz)  Body mass index is 25.62 kg/m².    Intake/Output Summary (Last 24 hours) at 4/15/2023 1120  Last data filed at 4/15/2023 0841  Gross per 24 hour   Intake 720 ml   Output 1350 ml   Net -630 ml        Physical Exam  Constitutional:       General: She is not in acute distress.     Appearance: She is not toxic-appearing or diaphoretic.   HENT:      Mouth/Throat:      Pharynx: Oropharynx is clear. No oropharyngeal exudate or posterior oropharyngeal erythema.   Cardiovascular:      Rate and Rhythm: Normal rate and regular rhythm.      Heart sounds: No murmur heard.    No gallop.   Pulmonary:      Effort: Pulmonary effort is normal. No respiratory distress.      Breath sounds: Normal breath sounds. No wheezing.   Abdominal:      General: Bowel sounds are normal. There is no distension.      Palpations: Abdomen is soft.      Tenderness: There is no abdominal tenderness.   Neurological:      Comments: Awake and alert.  Disoriented        Significant Labs: All pertinent labs within the past 24 hours have been reviewed.  BMP:   No results for input(s): GLU, NA, K, CL, CO2, BUN, CREATININE, CALCIUM, MG in the last 48 hours.    CBC:   Recent Labs   Lab 04/14/23  0407   WBC 8.97   HGB 8.3*   HCT 26.9*            Significant Imaging: I have reviewed all pertinent imaging results/findings within the past 24 hours.

## 2023-04-15 NOTE — NURSING
[facility]  Nurses Note -- 4 Eyes      4/14/2023       Skin assessed on: Q Shift      [x] No Pressure Injuries Present    []Prevention Measures Documented    [] Yes LDA  for Pressure Injury Previously documented     [] Yes New Pressure Injury Discovered   [] LDA for New Pressure Injury Added      Attending RN:  Luis M Lockhart RN     Second RN:  Darleen Adam RN

## 2023-04-15 NOTE — PLAN OF CARE
Patient slept adequately overnight. Skin integrity promoted an maintained. Patient did not verbalize or demonstrate significant pain.      Problem: Fall Injury Risk  Goal: Absence of Fall and Fall-Related Injury  Outcome: Ongoing, Progressing     Problem: Skin Injury Risk Increased  Goal: Skin Health and Integrity  Outcome: Ongoing, Progressing

## 2023-04-15 NOTE — NURSING
[facility], [facility] [place]  Nurses Note -- 4 Eyes      4/15/2023       Skin assessed on: Q Shift      [x] No Pressure Injuries Present    [x]Prevention Measures Documented    [] Yes LDA  for Pressure Injury Previously documented     [] Yes New Pressure Injury Discovered   [] LDA for New Pressure Injury Added      Attending RN:  Candice Jiang RN     Second RN:  SERJIO Galindo

## 2023-04-15 NOTE — PROGRESS NOTES
[facility]  Progress Note    Patient Name: Daniela Harris  MRN: [id number]  Patient Class: IP- Inpatient   Admission Date: 4/9/2023  Length of Stay: 6 days  Attending Physician: Kenji Deras MD  Primary Care Provider: Tayo Varela MD        Subjective:     Principal Problem:Closed fracture of neck of left femur        HPI:  This is an 83-year-old female with a past medical history of Alzheimer's dementia, hypertension, hyperlipidemia, CKD 4, hypothyroidism, PUD, who presents after a fall.    Patient had an unwitnessed fall the morning of presentation while she was at her nursing home.  Per the patient's son, Jayant, she was left sitting on the edge of the bed and later fell down to the floor, unknown if she hit her head.  She uses a walker to ambulate at baseline.  Patient has a history of dementia and is unable to contribute to the history.    In the ED, the patient was hypertensive.  Labs remarkable for leukocytosis (13.4), normocytic anemia (10.7-baseline 11-12), elevated creatinine (1.6-around baseline).  UA showed +3 leukocytes, > 100 WBCs, and many bacteria.  CT head/cervical spine showed no acute process.  Left CT hip showed a transcervical femoral neck fracture.  The patient was admitted for further management.      Overview/Hospital Course:  84 y/o female admitted with hip fracture.  Ortho consulted and underwent ORIF on 4/10.  PT/OT consulted.  Recommending SNF.  SW/CM consulted.      Interval History: no issues overnight.    Review of Systems   Unable to perform ROS: Dementia   Objective:     Vital Signs (Most Recent):  Temp: 98.1 °F (36.7 °C) (04/15/23 1051)  Pulse: 78 (04/15/23 1051)  Resp: 17 (04/15/23 1051)  BP: (!) 114/56 (04/15/23 1051)  SpO2: (!) 94 % (04/15/23 1051)   Vital Signs (24h Range):  Temp:  [97.6 °F (36.4 °C)-98.8 °F (37.1 °C)] 98.1 °F (36.7 °C)  Pulse:  [65-79] 78  Resp:  [16-20] 17  SpO2:  [93 %-95 %] 94 %  BP: (114-142)/(56-66) 114/56     Weight:  81 kg (178 lb 9.2 oz)  Body mass index is 25.62 kg/m².    Intake/Output Summary (Last 24 hours) at 4/15/2023 1120  Last data filed at 4/15/2023 0841  Gross per 24 hour   Intake 720 ml   Output 1350 ml   Net -630 ml        Physical Exam  Constitutional:       General: She is not in acute distress.     Appearance: She is not toxic-appearing or diaphoretic.   HENT:      Mouth/Throat:      Pharynx: Oropharynx is clear. No oropharyngeal exudate or posterior oropharyngeal erythema.   Cardiovascular:      Rate and Rhythm: Normal rate and regular rhythm.      Heart sounds: No murmur heard.    No gallop.   Pulmonary:      Effort: Pulmonary effort is normal. No respiratory distress.      Breath sounds: Normal breath sounds. No wheezing.   Abdominal:      General: Bowel sounds are normal. There is no distension.      Palpations: Abdomen is soft.      Tenderness: There is no abdominal tenderness.   Neurological:      Comments: Awake and alert.  Disoriented        Significant Labs: All pertinent labs within the past 24 hours have been reviewed.  BMP:   No results for input(s): GLU, NA, K, CL, CO2, BUN, CREATININE, CALCIUM, MG in the last 48 hours.    CBC:   Recent Labs   Lab 04/14/23  0407   WBC 8.97   HGB 8.3*   HCT 26.9*            Significant Imaging: I have reviewed all pertinent imaging results/findings within the past 24 hours.      Assessment/Plan:      * Closed fracture of neck of left femur, non displaced   Presented after an unwitnessed fall.    Left CT hip showed a transcervical femoral neck fracture.  Plan:   Orthopedic surgery consulted.  S/P ORIF on 4/10  Pain control   DVT prophylaxis   PT/OT consult.  Recommending SNF.  CM/SW consulted.  Discharge to SNF once arranged.    Bacteriuria  Initially held off ABx's.  Low grade fever overnight.  Will treat with short course of oral Cipro.      CKD (chronic kidney disease) stage 4  At baseline.  Continue to monitor.      Late onset Alzheimer's disease without  behavioral disturbance  No acute issues.      Hypothyroidism  Continue synthroid.      Hypertension  Resume home meds      Hyperlipidemia  Outpatient follow-up.        VTE Risk Mitigation (From admission, onward)         Ordered     heparin (porcine) injection 5,000 Units  Every 8 hours         04/09/23 1911     IP VTE HIGH RISK PATIENT  Once         04/09/23 1911     Place sequential compression device  Until discontinued         04/09/23 1911                Discharge Planning   ARNLE:      Code Status: Full Code   Is the patient medically ready for discharge?: Yes    Reason for patient still in hospital (select all that apply): Pending disposition  Discharge Plan A: Skilled Nursing Facility   Discharge Delays: (!) Post-Acute Set-up              Kenji Deras MD  Department of Hospital Medicine   [facility]

## 2023-04-16 PROCEDURE — 25000003 PHARM REV CODE 250: Performed by: HOSPITALIST

## 2023-04-16 PROCEDURE — 11000001 HC ACUTE MED/SURG PRIVATE ROOM

## 2023-04-16 PROCEDURE — 63600175 PHARM REV CODE 636 W HCPCS: Performed by: STUDENT IN AN ORGANIZED HEALTH CARE EDUCATION/TRAINING PROGRAM

## 2023-04-16 PROCEDURE — 25000003 PHARM REV CODE 250: Performed by: STUDENT IN AN ORGANIZED HEALTH CARE EDUCATION/TRAINING PROGRAM

## 2023-04-16 RX ADMIN — CLONIDINE HYDROCHLORIDE 0.1 MG: 0.1 TABLET ORAL at 05:04

## 2023-04-16 RX ADMIN — PANTOPRAZOLE SODIUM 40 MG: 40 TABLET, DELAYED RELEASE ORAL at 09:04

## 2023-04-16 RX ADMIN — Medication 324 MG: at 09:04

## 2023-04-16 RX ADMIN — HEPARIN SODIUM 5000 UNITS: 5000 INJECTION INTRAVENOUS; SUBCUTANEOUS at 01:04

## 2023-04-16 RX ADMIN — CIPROFLOXACIN 500 MG: 500 TABLET, FILM COATED ORAL at 09:04

## 2023-04-16 RX ADMIN — CLONIDINE HYDROCHLORIDE 0.1 MG: 0.1 TABLET ORAL at 09:04

## 2023-04-16 RX ADMIN — POLYETHYLENE GLYCOL 3350 17 G: 17 POWDER, FOR SOLUTION ORAL at 09:04

## 2023-04-16 RX ADMIN — LISINOPRIL 20 MG: 20 TABLET ORAL at 09:04

## 2023-04-16 RX ADMIN — CIPROFLOXACIN 500 MG: 500 TABLET, FILM COATED ORAL at 08:04

## 2023-04-16 RX ADMIN — HEPARIN SODIUM 5000 UNITS: 5000 INJECTION INTRAVENOUS; SUBCUTANEOUS at 05:04

## 2023-04-16 RX ADMIN — HEPARIN SODIUM 5000 UNITS: 5000 INJECTION INTRAVENOUS; SUBCUTANEOUS at 09:04

## 2023-04-16 RX ADMIN — LEVOTHYROXINE SODIUM 75 MCG: 75 TABLET ORAL at 05:04

## 2023-04-16 RX ADMIN — CLONIDINE HYDROCHLORIDE 0.1 MG: 0.1 TABLET ORAL at 08:04

## 2023-04-16 RX ADMIN — Medication 324 MG: at 04:04

## 2023-04-16 NOTE — NURSING
[facility], [facility]  Nurses Note -- 4 Eyes      4/16/2023       Skin assessed on: Q Shift      [x] No Pressure Injuries Present    [x]Prevention Measures Documented    [] Yes LDA  for Pressure Injury Previously documented     [] Yes New Pressure Injury Discovered   [] LDA for New Pressure Injury Added      Attending RN:  Candice Jiang RN     Second RN:  Constance Stewart RN

## 2023-04-16 NOTE — NURSING
[facility], [facility]  Nurses Note -- 4 Eyes      4-15-23        Skin assessed on: Q Shift      [x] No Pressure Injuries Present    [x]Prevention Measures Documented                Left hip surgical dressing CDI                 Placed new foam dressing               on buttocks/sacrum area    [] Yes LDA  for Pressure Injury Previously documented     [] Yes New Pressure Injury Discovered   [] LDA for New Pressure Injury Added      Attending RN:  Mireille Mena, RN     Second RN:  Blanca Goldberg, PCA

## 2023-04-16 NOTE — SUBJECTIVE & OBJECTIVE
Interval History: about the same.    Review of Systems   Unable to perform ROS: Dementia   Objective:     Vital Signs (Most Recent):  Temp: 98.1 °F (36.7 °C) (04/16/23 0716)  Pulse: 69 (04/16/23 0716)  Resp: 16 (04/16/23 0716)  BP: (!) 140/59 (04/16/23 0716)  SpO2: 98 % (04/16/23 0716)   Vital Signs (24h Range):  Temp:  [98.1 °F (36.7 °C)-99.4 °F (37.4 °C)] 98.1 °F (36.7 °C)  Pulse:  [69-88] 69  Resp:  [16-20] 16  SpO2:  [94 %-99 %] 98 %  BP: (114-194)/(54-79) 140/59     Weight: 81 kg (178 lb 9.2 oz)  Body mass index is 25.62 kg/m².    Intake/Output Summary (Last 24 hours) at 4/16/2023 1050  Last data filed at 4/16/2023 0830  Gross per 24 hour   Intake 675 ml   Output 1450 ml   Net -775 ml        Physical Exam  Constitutional:       General: She is not in acute distress.     Appearance: She is not toxic-appearing or diaphoretic.   HENT:      Mouth/Throat:      Pharynx: Oropharynx is clear. No oropharyngeal exudate or posterior oropharyngeal erythema.   Cardiovascular:      Rate and Rhythm: Normal rate and regular rhythm.      Heart sounds: No murmur heard.    No gallop.   Pulmonary:      Effort: Pulmonary effort is normal. No respiratory distress.      Breath sounds: Normal breath sounds. No wheezing.   Abdominal:      General: Bowel sounds are normal. There is no distension.      Palpations: Abdomen is soft.      Tenderness: There is no abdominal tenderness.   Neurological:      Comments: Awake and alert.  Disoriented        Significant Labs: All pertinent labs within the past 24 hours have been reviewed.  BMP:   No results for input(s): GLU, NA, K, CL, CO2, BUN, CREATININE, CALCIUM, MG in the last 48 hours.    CBC:   No results for input(s): WBC, HGB, HCT, PLT in the last 48 hours.      Significant Imaging: I have reviewed all pertinent imaging results/findings within the past 24 hours.

## 2023-04-16 NOTE — PROGRESS NOTES
[facility] Medicine  Progress Note    Patient Name: Daniela Harris  MRN: [id number]  Patient Class: IP- Inpatient   Admission Date: 4/9/2023  Length of Stay: 7 days  Attending Physician: Kenji Deras MD  Primary Care Provider: Tayo Varela MD        Subjective:     Principal Problem:Closed fracture of neck of left femur        HPI:  This is an 83-year-old female with a past medical history of Alzheimer's dementia, hypertension, hyperlipidemia, CKD 4, hypothyroidism, PUD, who presents after a fall.    Patient had an unwitnessed fall the morning of presentation while she was at her nursing home.  Per the patient's son, Jayant, she was left sitting on the edge of the bed and later fell down to the floor, unknown if she hit her head.  She uses a walker to ambulate at baseline.  Patient has a history of dementia and is unable to contribute to the history.    In the ED, the patient was hypertensive.  Labs remarkable for leukocytosis (13.4), normocytic anemia (10.7-baseline 11-12), elevated creatinine (1.6-around baseline).  UA showed +3 leukocytes, > 100 WBCs, and many bacteria.  CT head/cervical spine showed no acute process.  Left CT hip showed a transcervical femoral neck fracture.  The patient was admitted for further management.      Overview/Hospital Course:  84 y/o female admitted with hip fracture.  Ortho consulted and underwent ORIF on 4/10.  PT/OT consulted.  Recommending SNF.  SW/CM consulted.      Interval History: about the same.    Review of Systems   Unable to perform ROS: Dementia   Objective:     Vital Signs (Most Recent):  Temp: 98.1 °F (36.7 °C) (04/16/23 0716)  Pulse: 69 (04/16/23 0716)  Resp: 16 (04/16/23 0716)  BP: (!) 140/59 (04/16/23 0716)  SpO2: 98 % (04/16/23 0716)   Vital Signs (24h Range):  Temp:  [98.1 °F (36.7 °C)-99.4 °F (37.4 °C)] 98.1 °F (36.7 °C)  Pulse:  [69-88] 69  Resp:  [16-20] 16  SpO2:  [94 %-99 %] 98 %  BP: (114-194)/(54-79) 140/59     Weight: 81 kg  (178 lb 9.2 oz)  Body mass index is 25.62 kg/m².    Intake/Output Summary (Last 24 hours) at 4/16/2023 1050  Last data filed at 4/16/2023 0830  Gross per 24 hour   Intake 675 ml   Output 1450 ml   Net -775 ml        Physical Exam  Constitutional:       General: She is not in acute distress.     Appearance: She is not toxic-appearing or diaphoretic.   HENT:      Mouth/Throat:      Pharynx: Oropharynx is clear. No oropharyngeal exudate or posterior oropharyngeal erythema.   Cardiovascular:      Rate and Rhythm: Normal rate and regular rhythm.      Heart sounds: No murmur heard.    No gallop.   Pulmonary:      Effort: Pulmonary effort is normal. No respiratory distress.      Breath sounds: Normal breath sounds. No wheezing.   Abdominal:      General: Bowel sounds are normal. There is no distension.      Palpations: Abdomen is soft.      Tenderness: There is no abdominal tenderness.   Neurological:      Comments: Awake and alert.  Disoriented        Significant Labs: All pertinent labs within the past 24 hours have been reviewed.  BMP:   No results for input(s): GLU, NA, K, CL, CO2, BUN, CREATININE, CALCIUM, MG in the last 48 hours.    CBC:   No results for input(s): WBC, HGB, HCT, PLT in the last 48 hours.      Significant Imaging: I have reviewed all pertinent imaging results/findings within the past 24 hours.      Assessment/Plan:      * Closed fracture of neck of left femur, non displaced   Presented after an unwitnessed fall.    Left CT hip showed a transcervical femoral neck fracture.  Plan:   Orthopedic surgery consulted.  S/P ORIF on 4/10  Pain control   DVT prophylaxis   PT/OT consult.  Recommending SNF.  CM/SW consulted.  Discharge to SNF once arranged.    Bacteriuria  Initially held off ABx's.  Low grade fever overnight.  Will treat with short course of oral Cipro.      CKD (chronic kidney disease) stage 4  At baseline.  Continue to monitor.      Late onset Alzheimer's disease without behavioral  disturbance  No acute issues.      Hypothyroidism  Continue synthroid.      Hypertension  Resume home meds      Hyperlipidemia  Outpatient follow-up.        VTE Risk Mitigation (From admission, onward)         Ordered     heparin (porcine) injection 5,000 Units  Every 8 hours         04/09/23 1911     IP VTE HIGH RISK PATIENT  Once         04/09/23 1911     Place sequential compression device  Until discontinued         04/09/23 1911                Discharge Planning   ARNEL:      Code Status: Full Code   Is the patient medically ready for discharge?: Yes    Reason for patient still in hospital (select all that apply): Pending disposition  Discharge Plan A: Skilled Nursing Facility   Discharge Delays: (!) Post-Acute Set-up              Kenji Deras MD  Department of Hospital Medicine   [facility] [place] [facility]

## 2023-04-17 PROCEDURE — 97110 THERAPEUTIC EXERCISES: CPT

## 2023-04-17 PROCEDURE — 97530 THERAPEUTIC ACTIVITIES: CPT | Mod: CQ

## 2023-04-17 PROCEDURE — 97530 THERAPEUTIC ACTIVITIES: CPT

## 2023-04-17 PROCEDURE — 25000003 PHARM REV CODE 250: Performed by: HOSPITALIST

## 2023-04-17 PROCEDURE — 11000001 HC ACUTE MED/SURG PRIVATE ROOM

## 2023-04-17 PROCEDURE — 25000003 PHARM REV CODE 250: Performed by: STUDENT IN AN ORGANIZED HEALTH CARE EDUCATION/TRAINING PROGRAM

## 2023-04-17 PROCEDURE — 63600175 PHARM REV CODE 636 W HCPCS: Performed by: STUDENT IN AN ORGANIZED HEALTH CARE EDUCATION/TRAINING PROGRAM

## 2023-04-17 PROCEDURE — 97110 THERAPEUTIC EXERCISES: CPT | Mod: CQ

## 2023-04-17 RX ADMIN — Medication 324 MG: at 05:04

## 2023-04-17 RX ADMIN — CIPROFLOXACIN 500 MG: 500 TABLET, FILM COATED ORAL at 08:04

## 2023-04-17 RX ADMIN — PANTOPRAZOLE SODIUM 40 MG: 40 TABLET, DELAYED RELEASE ORAL at 08:04

## 2023-04-17 RX ADMIN — HEPARIN SODIUM 5000 UNITS: 5000 INJECTION INTRAVENOUS; SUBCUTANEOUS at 09:04

## 2023-04-17 RX ADMIN — CLONIDINE HYDROCHLORIDE 0.1 MG: 0.1 TABLET ORAL at 08:04

## 2023-04-17 RX ADMIN — LISINOPRIL 20 MG: 20 TABLET ORAL at 08:04

## 2023-04-17 RX ADMIN — HEPARIN SODIUM 5000 UNITS: 5000 INJECTION INTRAVENOUS; SUBCUTANEOUS at 02:04

## 2023-04-17 RX ADMIN — CIPROFLOXACIN 500 MG: 500 TABLET, FILM COATED ORAL at 09:04

## 2023-04-17 RX ADMIN — Medication 324 MG: at 08:04

## 2023-04-17 RX ADMIN — HEPARIN SODIUM 5000 UNITS: 5000 INJECTION INTRAVENOUS; SUBCUTANEOUS at 05:04

## 2023-04-17 RX ADMIN — CLONIDINE HYDROCHLORIDE 0.1 MG: 0.1 TABLET ORAL at 09:04

## 2023-04-17 RX ADMIN — LEVOTHYROXINE SODIUM 75 MCG: 75 TABLET ORAL at 05:04

## 2023-04-17 NOTE — PROGRESS NOTES
The patient is in bed.  She denies any complaints.     Temp:  [98.1 °F (36.7 °C)-99.4 °F (37.4 °C)] 98.7 °F (37.1 °C)  Pulse:  [59-88] 77  Resp:  [16-20] 18  SpO2:  [95 %-99 %] 96 %  BP: (106-194)/(59-79) 133/60    Physical examination:    Left thigh dressing in place.  Clinical alignment is good.      No results for input(s): WBC, RBC, HGB, HCT, PLT, MCV, MCH, MCHC in the last 24 hours.      Current Facility-Administered Medications:     acetaminophen tablet 650 mg, 650 mg, Oral, Q4H PRN, Kenji Deras MD    aluminum-magnesium hydroxide-simethicone 200-200-20 mg/5 mL suspension 30 mL, 30 mL, Oral, QID PRN, Silviano Garcia MD    bisacodyL suppository 10 mg, 10 mg, Rectal, Daily PRN, Silviano Garcia MD    ciprofloxacin HCl tablet 500 mg, 500 mg, Oral, Q12H, Kenji Deras MD, 500 mg at 04/16/23 0911    cloNIDine tablet 0.1 mg, 0.1 mg, Oral, BID, Silviano Garcia MD, 0.1 mg at 04/16/23 0911    cloNIDine tablet 0.1 mg, 0.1 mg, Oral, Q8H PRN, Kenji Deras MD, 0.1 mg at 04/16/23 0526    dextrose 50% injection 12.5 g, 12.5 g, Intravenous, PRN, Silviano Garcia MD    dextrose 50% injection 25 g, 25 g, Intravenous, PRN, Silviano Garcia MD    ferrous gluconate tablet 324 mg, 324 mg, Oral, BID WM, Silviano Garcia MD, 324 mg at 04/16/23 1623    glucagon (human recombinant) injection 1 mg, 1 mg, Intramuscular, PRN, Silviano Garcia MD    glucose chewable tablet 16 g, 16 g, Oral, PRN, Silviano Garcia MD    glucose chewable tablet 24 g, 24 g, Oral, PRN, Silviano Garcia MD    heparin (porcine) injection 5,000 Units, 5,000 Units, Subcutaneous, Q8H, Silviano Garcia MD, 5,000 Units at 04/16/23 1309    HYDROcodone-acetaminophen 5-325 mg per tablet 1 tablet, 1 tablet, Oral, Q4H PRN, Kenji Deras MD    levothyroxine tablet 75 mcg, 75 mcg, Oral, Before breakfast, Silviano Garcia MD, 75 mcg at 04/16/23 0525    lisinopriL tablet 20 mg, 20 mg, Oral, Daily, Silviano Garcia MD, 20 mg at 04/16/23 0911    magnesium oxide tablet 800 mg, 800 mg, Oral, PRN, Silviano  MD Jose    magnesium oxide tablet 800 mg, 800 mg, Oral, PRN, Silviano Garcia MD    melatonin tablet 6 mg, 6 mg, Oral, Nightly PRN, Silviano Garcia MD, 6 mg at 04/12/23 2235    naloxone 0.4 mg/mL injection 0.02 mg, 0.02 mg, Intravenous, PRN, Silviano Garcia MD    ondansetron injection 4 mg, 4 mg, Intravenous, Q8H PRN, Silviano Garcia MD    oxyCODONE-acetaminophen 5-325 mg per tablet 1 tablet, 1 tablet, Oral, Daily, Kenji Deras MD, 1 tablet at 04/15/23 0901    pantoprazole EC tablet 40 mg, 40 mg, Oral, Daily, Silviano Garcia MD, 40 mg at 04/16/23 0911    polyethylene glycol packet 17 g, 17 g, Oral, Daily, Silviano Garcia MD, 17 g at 04/16/23 0910    potassium bicarbonate disintegrating tablet 35 mEq, 35 mEq, Oral, PRN, Silviano Garcia MD    potassium bicarbonate disintegrating tablet 50 mEq, 50 mEq, Oral, PRN, Silviano Garcia MD    potassium bicarbonate disintegrating tablet 60 mEq, 60 mEq, Oral, PRN, Silviano Garcia MD    potassium, sodium phosphates 280-160-250 mg packet 2 packet, 2 packet, Oral, PRN, Silviano Garcia MD    potassium, sodium phosphates 280-160-250 mg packet 2 packet, 2 packet, Oral, PRN, Silviano Garcia MD    potassium, sodium phosphates 280-160-250 mg packet 2 packet, 2 packet, Oral, PRN, Silviano Garcia MD    prochlorperazine injection Soln 5 mg, 5 mg, Intravenous, Q6H PRN, Silviano Garcia MD    simethicone chewable tablet 80 mg, 1 tablet, Oral, QID PRN, Silviano Garcia MD    sodium chloride 0.9% flush 10 mL, 10 mL, Intravenous, Q12H PRN, Silviano Garcia MD, 10 mL at 04/09/23 2222    Assessment and Plan:    83-year-old female status post fall with left hip femoral neck fracture status post ORIF with femoral neck system.      PT/OT    DVT prophylaxis-heparin, Rocco's, SCDs    Disposition-skilled nursing pending    Ml Hernandez MD  [facility]  [phone]  This note has been transcribed with voice recognition software, but not reviewed and may contain unrecognized errors.

## 2023-04-17 NOTE — PLAN OF CARE
Problem: Occupational Therapy  Goal: Occupational Therapy Goal  Description: Goals to be met by: 04/25/23     Patient will increase functional independence with ADLs by performing:    Feeding with Minimal Assistance (finger foods).  Patient will stand statically for 2 min with Minimal assistance to assist with toileting for hygiene and clothing management.   Sitting at edge of bed x15 minutes with Supervision.  Supine to sit with Minimal Assistance.  Step transfer with Minimal Assistance  Toilet transfer to bedside commode with Minimal Assistance.  Upper extremity exercise program x15 reps per handout, with assistance as needed.    Outcome: Ongoing, Progressing     Dependent x2 supine<>sit. Pt with increased resistance with all extremities while seated EOB requiring max A for static and dynamic sit balance. Attempted distraction with [employer] music and calm cueing, but pt continued to resist many activities and exercises.

## 2023-04-17 NOTE — PROGRESS NOTES
Orthopedic Surgery Progress Note    Subjective:  No acute issues. Continues to have some difficulty mobilizing with PT.     Objective:  Vital signs in last 24 hours:  Temp:  [97.8 °F (36.6 °C)-99 °F (37.2 °C)] 98.7 °F (37.1 °C)  Pulse:  [61-77] 77  Resp:  [16-19] 19  SpO2:  [95 %-99 %] 99 %  BP: (133-185)/(60-77) 185/77    Physical Exam:   NAD, dementia but nods in response, nonlabored respirations. L hip dressings c/d/i. No significant swelling. Does not react in much pain. Moves foot spontaneously. Toes warm and well perfused.     Data Review  CBC:   Lab Results   Component Value Date    WBC 8.97 04/14/2023    RBC 2.79 (L) 04/14/2023    HGB 8.3 (L) 04/14/2023    HCT 26.9 (L) 04/14/2023     04/14/2023       Assessment/Plan: s/p L hip ORIF with femoral neck system  - WBAT LLE. PT/OT.   - DVT ppx - heparin, SCDs  - Dispo - pending SNF placement.     Britt Garcia MD  Orthopedics  [facility]

## 2023-04-17 NOTE — PT/OT/SLP PROGRESS
Physical Therapy Treatment    Patient Name:  Daniela Harris   MRN:  [id number]    Recommendations:     Discharge Recommendations: nursing facility, skilled  Discharge Equipment Recommendations:  (TBD)  Barriers to discharge:     Assessment:     Daniela Harris is a 83 y.o. female admitted with a medical diagnosis of Closed fracture of neck of left femur.  She presents with the following impairments/functional limitations: weakness, impaired endurance, impaired self care skills, gait instability, impaired balance, decreased upper extremity function, decreased lower extremity function, decreased ROM, impaired functional mobility, impaired cognition, decreased safety awareness, pain, edema, orthopedic precautions, decreased coordination .    Rehab Prognosis: Fair- ; patient would benefit from acute skilled PT services to address these deficits and reach maximum level of function.    Recent Surgery: Procedure(s) (LRB):  ORIF, HIP PINNING SYTHNES (Left) 7 Days Post-Op    Plan:     During this hospitalization, patient to be seen 6 x/week to address the identified rehab impairments via gait training, therapeutic activities, therapeutic exercises, wheelchair management/training and progress toward the following goals:    Plan of Care Expires:  04/25/23    Subjective     Chief Complaint:   Patient/Family Comments/goals:  pt smiles , nodded to therapy activity .   Pain/Comfort:  Pain Rating 1: 0/10  Pain Rating Post-Intervention 1: 0/10      Objective:     Communicated with nurse prior to session.  Patient found right sidelying with telemetry, peripheral IV, PureWick, bed alarm, SCD upon PT entry to room.     General Precautions: Standard, fall  Orthopedic Precautions: LLE weight bearing as tolerated  Braces: N/A  Respiratory Status: Room air     Functional Mobility:  Bed Mobility:     Rolling Left:  dependence and of 2 persons  Rolling Right: dependence and of 2 persons  Scooting: dependence and of 2 persons  Supine to Sit:  dependence and of 2 persons  Sit to Supine: dependence and of 2 persons  Balance: poor , pt with severe posterior leaning, pushing back/resisted with movements required MAX/Total A for sitting balance.       AM-PAC 6 CLICK MOBILITY  Turning over in bed (including adjusting bedclothes, sheets and blankets)?: 2  Sitting down on and standing up from a chair with arms (e.g., wheelchair, bedside commode, etc.): 1  Moving from lying on back to sitting on the side of the bed?: 2  Moving to and from a bed to a chair (including a wheelchair)?: 1  Need to walk in hospital room?: 1  Climbing 3-5 steps with a railing?: 1  Basic Mobility Total Score: 8       Treatment & Education:  Performed PROM BLE x 10 x 2 reps each in all available planes while sitting EOB and in bed .      Patient left left sidelying with foam wedge to offload R side, SCD /pressure relief boots to BLE , HOB elevated towel roll between knees  all lines intact, call button in reach, bed alarm on, nurse notified, and [employer]  present..    GOALS:   Multidisciplinary Problems       Physical Therapy Goals          Problem: Physical Therapy    Goal Priority Disciplines Outcome Goal Variances Interventions   Physical Therapy Goal     PT, PT/OT Ongoing, Progressing     Description: Goals to be met by: 23     Patient will increase functional independence with mobility by performin. Supine to sit with Moderate Assistance  2. Rolling to Left and Right with Moderate Assistance  3. Sit to stand transfer with Moderate Assistance using RW  4. Bed to chair transfer with Moderate Assistance   5. Gait ~20-30 feet with Moderate Assistance using Rolling Walker   6. Wheelchair propulsion ~ feet with Minimal Assistance using bilateral upper extremities  7. Lower extremity exercise program x10 reps per handout, with assistance as needed                         Time Tracking:     PT Received On: 23  PT Start Time: 1516     PT Stop Time: 1539  PT Total Time  (min): 23 min     Billable Minutes: Therapeutic Activity 11, Therapeutic Exercise 12, and Total Time 23 min with OT     Treatment Type: Treatment  PT/PTA: PTA     Number of PTA visits since last PT visit: 4     04/17/2023

## 2023-04-17 NOTE — PROGRESS NOTES
[facility] Medicine  Progress Note    Patient Name: Daniela Harris  MRN: [id number]  Patient Class: IP- Inpatient   Admission Date: 4/9/2023  Length of Stay: 8 days  Attending Physician: Kenji Deras MD  Primary Care Provider: Tayo Varela MD        Subjective:     Principal Problem:Closed fracture of neck of left femur        HPI:  This is an 83-year-old female with a past medical history of Alzheimer's dementia, hypertension, hyperlipidemia, CKD 4, hypothyroidism, PUD, who presents after a fall.    Patient had an unwitnessed fall the morning of presentation while she was at her nursing home.  Per the patient's son, Jayant, she was left sitting on the edge of the bed and later fell down to the floor, unknown if she hit her head.  She uses a walker to ambulate at baseline.  Patient has a history of dementia and is unable to contribute to the history.    In the ED, the patient was hypertensive.  Labs remarkable for leukocytosis (13.4), normocytic anemia (10.7-baseline 11-12), elevated creatinine (1.6-around baseline).  UA showed +3 leukocytes, > 100 WBCs, and many bacteria.  CT head/cervical spine showed no acute process.  Left CT hip showed a transcervical femoral neck fracture.  The patient was admitted for further management.      Overview/Hospital Course:  82 y/o female admitted with hip fracture.  Ortho consulted and underwent ORIF on 4/10.  PT/OT consulted.  Recommending SNF.  SW/CM consulted.      Interval History: no events overnight.    Review of Systems   Unable to perform ROS: Dementia   Objective:     Vital Signs (Most Recent):  Temp: 98.4 °F (36.9 °C) (04/17/23 1139)  Pulse: 77 (04/17/23 1139)  Resp: 15 (04/17/23 1139)  BP: (!) 164/85 (04/17/23 1139)  SpO2: 97 % (04/17/23 1139)   Vital Signs (24h Range):  Temp:  [97.8 °F (36.6 °C)-99 °F (37.2 °C)] 98.4 °F (36.9 °C)  Pulse:  [61-77] 77  Resp:  [15-19] 15  SpO2:  [95 %-99 %] 97 %  BP: (133-185)/(60-85) 164/85     Weight: 80.7  kg (178 lb)  Body mass index is 25.54 kg/m².    Intake/Output Summary (Last 24 hours) at 4/17/2023 1349  Last data filed at 4/17/2023 1219  Gross per 24 hour   Intake 600 ml   Output 600 ml   Net 0 ml        Physical Exam  Constitutional:       General: She is not in acute distress.     Appearance: She is not toxic-appearing or diaphoretic.   HENT:      Mouth/Throat:      Pharynx: Oropharynx is clear. No oropharyngeal exudate or posterior oropharyngeal erythema.   Cardiovascular:      Rate and Rhythm: Normal rate and regular rhythm.      Heart sounds: No murmur heard.    No gallop.   Pulmonary:      Effort: Pulmonary effort is normal. No respiratory distress.      Breath sounds: Normal breath sounds. No wheezing.   Abdominal:      General: Bowel sounds are normal. There is no distension.      Palpations: Abdomen is soft.      Tenderness: There is no abdominal tenderness.   Neurological:      Comments: Awake and alert.  Disoriented        Significant Labs: All pertinent labs within the past 24 hours have been reviewed.  BMP:   No results for input(s): GLU, NA, K, CL, CO2, BUN, CREATININE, CALCIUM, MG in the last 48 hours.    CBC:   No results for input(s): WBC, HGB, HCT, PLT in the last 48 hours.      Significant Imaging: I have reviewed all pertinent imaging results/findings within the past 24 hours.      Assessment/Plan:      * Closed fracture of neck of left femur, non displaced   Presented after an unwitnessed fall.    Left CT hip showed a transcervical femoral neck fracture.  Plan:   Orthopedic surgery consulted.  S/P ORIF on 4/10  Pain control   DVT prophylaxis   PT/OT consult.  Recommending SNF.  CM/SW consulted.  Discharge to SNF once arranged.    Bacteriuria  Initially held off ABx's.  Low grade fever overnight.  Will treat with short course of oral Cipro.      CKD (chronic kidney disease) stage 4  At baseline.  Continue to monitor.      Late onset Alzheimer's disease without behavioral disturbance  No acute  issues.      Hypothyroidism  Continue synthroid.      Hypertension  Resume home meds      Hyperlipidemia  Outpatient follow-up.        VTE Risk Mitigation (From admission, onward)         Ordered     heparin (porcine) injection 5,000 Units  Every 8 hours         04/09/23 1911     IP VTE HIGH RISK PATIENT  Once         04/09/23 1911     Place sequential compression device  Until discontinued         04/09/23 1911                Discharge Planning   ARNEL:      Code Status: Full Code   Is the patient medically ready for discharge?: Yes    Reason for patient still in hospital (select all that apply): Pending disposition  Discharge Plan A: Skilled Nursing Facility   Discharge Delays: (!) Post-Acute Set-up              Kenji Deras MD  Department of Hospital Medicine   [facility] [place] [facility] [place] [facility]

## 2023-04-17 NOTE — PLAN OF CARE
JEFF called patient's daughter in law, Vickie to discuss discharge planning. JEFF informed that patient has been accepted to [facility]. Vickie inquired about the next steps. JEFF explained that facility will be notified that patient is medically ready for discharge and family accepts placement. JEFF stated that facility will call family to sign paperwork. Vickie stated that she didn't know anything about the facility and looked it up. Vickie stated that they didn't have good reviews. JEFF inquired if she read reviews for [facility] in [place]. Vickie stated that she needed to speak to her  and call SW back.     12:46 pm    SW called Vickie to discuss discharge planning. There was no answer. JEFF left a message. JEFF called patient's son, Jayant to discuss discharge planning. There was no answer. JEFF left the message.     1:53 pm    Araceli, patient's daughter in law called SW to discuss discharge planning. Araceli informed JEFF that they are not happy with facilities that accepted patient and inquired if referral could be sent to [facility]. JEFF inquired about other facilities of interest and listed some in the area. Araceli agreed to [facility] in [place]. JEFF sent referrals. Araceli informed JEFF that she is point of contact. JEFF sent referrals.

## 2023-04-17 NOTE — PROGRESS NOTES
[facility] - Med Surg  Adult Nutrition  Consult Note    SUMMARY     Recommendations    1. Continue to monitor PO intake; if intake remains <50% consider ONS Boost Plus BID  2. Monitor weight changes; check weekly weights    Goals: 1. Meet % EEN/EPN by RD follow up  Nutrition Goal Status: new  Communication of RD Recs: other (comment) (POC)    Assessment and Plan    Nutrition Problem  Increased nutrient needs - protein    Related to (etiology):   Wound healing    Signs and Symptoms (as evidenced by):   Closed fracture of neck of left femur    Interventions/Recommendations (treatment strategy):  Collaboration with medical providers    Nutrition Diagnosis Status:   New     Reason for Assessment    Reason For Assessment: length of stay  Diagnosis:  (Closed fracture of neck of left femur, non displaced)  Relevant Medical History: psoriasis, stroke, hypothyroidism, HLD, B12 deficiency  Interdisciplinary Rounds: did not attend  General Information Comments: RD consult 2/2 LOS > 8 days. Pt intake varies from % of meals since admit. Pt with no previous signfiicant weight changes per chart review. Pt denies N/V/D/C. Denies weight changes. NFPE shows no signs of malnutrition at this time.   Nutrition Discharge Planning: Regular Diet    Nutrition Risk Screen    Nutrition Risk Screen: no indicators present    Nutrition/Diet History    Spiritual, Cultural Beliefs, Amish Practices, Values that Affect Care: no  Food Allergies: NKFA    Anthropometrics    Temp: 98.3 °F (36.8 °C)  Weight Method: Bed Scale  Weight: 80.7 kg (178 lb)  Weight (lb): 178 lb  BMI Grade: 25 - 29.9 - overweight       Lab/Procedures/Meds    Pertinent Labs Reviewed: reviewed  BMP  Lab Results   Component Value Date     04/10/2023    K 4.4 04/10/2023     (H) 04/10/2023    CO2 20 (L) 04/10/2023    BUN 33 (H) 04/10/2023    CREATININE 1.6 (H) 04/10/2023    CALCIUM 8.5 (L) 04/10/2023    ANIONGAP 7 (L) 04/10/2023    EGFRNORACEVR 31 (A)  04/10/2023      Pertinent Medications Reviewed: reviewed    Current Outpatient Medications   Medication Instructions    acetaminophen (TYLENOL EXTRA STRENGTH) 1,000 mg, Oral, 3 times daily PRN    acetylcysteine 600 mg Cap TAKE ONE CAPSULE BY MOUTH EVERY DAY FOR RESPIRATORY HEALTH    ascorbic acid (vitamin C) (VITAMIN C) 500 mg, Oral, Daily, 1 Tablet Oral Every day    b complex vitamins tablet 1 tablet, Oral, Daily    calcium carbonate (OS-GRIFFIN) 600 mg, Oral, Daily    cloNIDine (CATAPRES) 0.1 MG tablet TAKE ONE TABLET BY MOUTH TWICE DAILY    ferrous gluconate (FERGON) 324 MG tablet TAKE ONE TABLET BY MOUTH TWICE DAILY WITH MEALS    glucosamine-chondroitn sulf.Na 750-400 mg Cmpk 1 tablet, Oral, Daily, 1  By mouth Every day    levothyroxine (SYNTHROID) 75 MCG tablet TAKE ONE TABLET BY MOUTH IN THE MORNING ON AN EMPTY STOMACH    lisinopriL (PRINIVIL,ZESTRIL) 20 MG tablet TAKE ONE TABLET BY MOUTH EVERY DAY    multivitamin (THERAGRAN) per tablet 1 tablet, Oral, Daily, 1 Tablet Oral Every day    pantoprazole (PROTONIX) 40 MG tablet TAKE ONE TABLET BY MOUTH ONCE DAILY AS NEEDED    tobramycin-dexamethasone 0.3-0.1% (TOBRADEX) 0.3-0.1 % DrpS 1 drop, Both Eyes, 2 times daily, For one week        Estimated/Assessed Needs    Weight Used For Calorie Calculations: 80.7 kg (178 lb)  Energy Calorie Requirements (kcal): 1614 - 2018 kcal (20-25 kcal/kg)  Energy Need Method: Fallon- Mohamudor  Protein Requirements: 80 g (1.0 g/kg)  Weight Used For Protein Calculations: 80.7 kg (178 lb)        RDA Method (mL): 1614         Nutrition Prescription Ordered    Current Diet Order: Regular Diet    Evaluation of Received Nutrient/Fluid Intake    I/O: -1 L  Energy Calories Required: not meeting needs  Protein Required: not meeting needs  Fluid Required: not meeting needs  Comments: LBM 4/16/23  % Intake of Estimated Energy Needs: 50 - 75 %  % Meal Intake: 50 - 75 %    Nutrition Risk    Level of Risk/Frequency of Follow-up: low       Monitor and  Evaluation    Food and Nutrient Intake: food and beverage intake  Food and Nutrient Adminstration: diet order  Knowledge/Beliefs/Attitudes: food and nutrition knowledge/skill, beliefs and attitudes  Physical Activity and Function: nutrition-related ADLs and IADLs, factors affecting access to physical activity  Anthropometric Measurements: weight, weight change, body mass index  Biochemical Data, Medical Tests and Procedures: electrolyte and renal panel, gastrointestinal profile, glucose/endocrine profile, lipid profile, inflammatory profile  Nutrition-Focused Physical Findings: overall appearance       Nutrition Follow-Up    RD Follow-up?: Yes    Shari Mulligan, Registration Eligible, Provisional LDN

## 2023-04-17 NOTE — SUBJECTIVE & OBJECTIVE
Interval History: no events overnight.    Review of Systems   Unable to perform ROS: Dementia   Objective:     Vital Signs (Most Recent):  Temp: 98.4 °F (36.9 °C) (04/17/23 1139)  Pulse: 77 (04/17/23 1139)  Resp: 15 (04/17/23 1139)  BP: (!) 164/85 (04/17/23 1139)  SpO2: 97 % (04/17/23 1139)   Vital Signs (24h Range):  Temp:  [97.8 °F (36.6 °C)-99 °F (37.2 °C)] 98.4 °F (36.9 °C)  Pulse:  [61-77] 77  Resp:  [15-19] 15  SpO2:  [95 %-99 %] 97 %  BP: (133-185)/(60-85) 164/85     Weight: 80.7 kg (178 lb)  Body mass index is 25.54 kg/m².    Intake/Output Summary (Last 24 hours) at 4/17/2023 1349  Last data filed at 4/17/2023 1219  Gross per 24 hour   Intake 600 ml   Output 600 ml   Net 0 ml        Physical Exam  Constitutional:       General: She is not in acute distress.     Appearance: She is not toxic-appearing or diaphoretic.   HENT:      Mouth/Throat:      Pharynx: Oropharynx is clear. No oropharyngeal exudate or posterior oropharyngeal erythema.   Cardiovascular:      Rate and Rhythm: Normal rate and regular rhythm.      Heart sounds: No murmur heard.    No gallop.   Pulmonary:      Effort: Pulmonary effort is normal. No respiratory distress.      Breath sounds: Normal breath sounds. No wheezing.   Abdominal:      General: Bowel sounds are normal. There is no distension.      Palpations: Abdomen is soft.      Tenderness: There is no abdominal tenderness.   Neurological:      Comments: Awake and alert.  Disoriented        Significant Labs: All pertinent labs within the past 24 hours have been reviewed.  BMP:   No results for input(s): GLU, NA, K, CL, CO2, BUN, CREATININE, CALCIUM, MG in the last 48 hours.    CBC:   No results for input(s): WBC, HGB, HCT, PLT in the last 48 hours.      Significant Imaging: I have reviewed all pertinent imaging results/findings within the past 24 hours.

## 2023-04-17 NOTE — PLAN OF CARE
Recommendations    1. Continue to monitor PO intake; if intake remains <50% consider ONS Boost Plus BID  2. Monitor weight changes; check weekly weights    Goals: 1. Meet % EEN/EPN by RD follow up  Nutrition Goal Status: new  Communication of RD Recs: other (comment) (POC)    Assessment and Plan    Nutrition Problem  Increased nutrient needs - protein    Related to (etiology):   Wound healing    Signs and Symptoms (as evidenced by):   Closed fracture of neck of left femur    Interventions/Recommendations (treatment strategy):  Collaboration with medical providers    Nutrition Diagnosis Status:   New

## 2023-04-17 NOTE — PT/OT/SLP PROGRESS
Occupational Therapy   Treatment    Name: Daniela Harris  MRN: [id number]  Admitting Diagnosis:  Closed fracture of neck of left femur  7 Days Post-Op    Recommendations:     Discharge Recommendations: nursing facility, skilled  Discharge Equipment Recommendations:   (TBD)  Barriers to discharge:   (s/p fracture, requiring total A with all care at this time)    Assessment:     Daniela Harris is a 83 y.o. female with a medical diagnosis of Closed fracture of neck of left femur. Performance deficits affecting function are weakness, gait instability, decreased upper extremity function, decreased ROM, impaired endurance, impaired balance, decreased lower extremity function, decreased safety awareness, pain, impaired cognition, impaired self care skills, impaired skin, orthopedic precautions, edema, impaired functional mobility, decreased coordination.     Dependent x2 supine<>sit. Pt with increased resistance with all extremities while seated EOB requiring max A for static and dynamic sit balance. Attempted distraction with Baptism music and calm cueing, but pt continued to resist many activities and exercises    Rehab Prognosis:  Fair; patient would benefit from acute skilled OT services to address these deficits and reach maximum level of function.       Plan:     Patient to be seen 5 x/week to address the above listed problems via self-care/home management, therapeutic activities, therapeutic exercises  Plan of Care Expires: 04/25/23  Plan of Care Reviewed with: patient    Subjective     Chief Complaint: resistance with extremities with many movements   Patient/Family Comments/goals: smiling at times, grimaced with LLE movement. Pt with tight  to side railing with RUE while sitting EOB. No family present    Pain/Comfort:  Pain Rating 1:  (pt grimaced in pain with LLE movement at times)  Pain Addressed 1: Reposition, Distraction, Pre-medicate for activity, Cessation of Activity, Nurse notified  Pain Rating  Post-Intervention 1:  (pt appeared comfortable at end of session, falling asleep)    Objective:     Communicated with: nurse, Daria, prior to session. Patient found  HOB elevated R sidelying with RUE elevated on pillow  with bed alarm, peripheral IV, pressure relief boots, SCD, PureWick, telemetry (avasys) upon OT entry to room.    General Precautions: Standard, fall    Orthopedic Precautions:LLE weight bearing as tolerated  Braces: N/A  Respiratory Status: Room air     Occupational Performance:     Bed Mobility:    Patient completed Rolling/Turning to Left with  dependent and 2 persons  Patient completed Rolling/Turning to Right with dependent and 2 persons   Patient completed Scooting anteriorly with dependent and 2 persons  Patient completed Supine to Sit with dependent, 2 persons, and HOB elevated  Patient completed Sit to Supine with dependent and 2 persons   Patient completed Scooting in supine to HOB with bed in trendelenburg position with dependent and 2 persons    Functional Mobility/Transfers:  Unable to attempt today 2* increased resistance, pushing posteriorly, and tight  to side rail while EOB.     Activities of Daily Living:  Lower Body Dressing: dependence with adjusting socks       [facility] 6 Click ADL: 6    Treatment & Education:  Pt re-educated on OT role. No family present.    Tactile cueing and min A in supported sit at EOB for scapular retraction and upright posture   With OT positioned at eye level in front of pt and max-total A for sit balance, pt eventually placed B/L UE with max A on OT shoulder with max encouragement for trunk flexion/extension and weight shifting L<>R while listening to music.   With HOB elevated, pt initially resisting RUE AAROM/PROM, but with time, pt able to complete BUE AAROM x10 shoulder flex/ext, shoulder horizontal ab/dduction, elbow flex/ext       Patient left  HOB elevated L sidelying with B/L UE elevated on pillows, B/L SCDs and pressure relief boots in  place, purewick in place, towel roll in-between B/L knees  with all lines intact, call button in reach, bed alarm on, avasys present, and Lutheran music playing; door left open. Lights on, blinds opened.     GOALS:   Multidisciplinary Problems       Occupational Therapy Goals          Problem: Occupational Therapy    Goal Priority Disciplines Outcome Interventions   Occupational Therapy Goal     OT, PT/OT Ongoing, Progressing    Description: Goals to be met by: 04/25/23     Patient will increase functional independence with ADLs by performing:    Feeding with Minimal Assistance (finger foods).  Patient will stand statically for 2 min with Minimal assistance to assist with toileting for hygiene and clothing management.   Sitting at edge of bed x15 minutes with Supervision.  Supine to sit with Minimal Assistance.  Step transfer with Minimal Assistance  Toilet transfer to bedside commode with Minimal Assistance.  Upper extremity exercise program x15 reps per handout, with assistance as needed.                         Time Tracking:     OT Date of Treatment: 04/17/23  OT Start Time: 1516  OT Stop Time: 1539  OT Total Time (min): 23 min    Billable Minutes:Therapeutic Activity 15 min  Therapeutic Exercise 8 min  Total Time 23 min (co-treat with PTA)    OT/MAGGY: OT     Number of MAGGY visits since last OT visit: 0    4/17/2023

## 2023-04-17 NOTE — NURSING
Pt resting in awake, no signs of pain/discomfort. Scheduled medications given crushed without difficulty. IV saline lock. Dressing cdi on left hip. SCDs on. Purewick in place. Telesitter remains at bedside. Pt remained free from fall/injury. No distress noted. Safety measures maintained. Will cont to monitor

## 2023-04-18 LAB — SARS-COV-2 RDRP RESP QL NAA+PROBE: NEGATIVE

## 2023-04-18 PROCEDURE — 25000003 PHARM REV CODE 250: Performed by: STUDENT IN AN ORGANIZED HEALTH CARE EDUCATION/TRAINING PROGRAM

## 2023-04-18 PROCEDURE — 63600175 PHARM REV CODE 636 W HCPCS: Performed by: STUDENT IN AN ORGANIZED HEALTH CARE EDUCATION/TRAINING PROGRAM

## 2023-04-18 PROCEDURE — 11000001 HC ACUTE MED/SURG PRIVATE ROOM

## 2023-04-18 PROCEDURE — 97110 THERAPEUTIC EXERCISES: CPT

## 2023-04-18 PROCEDURE — 25000003 PHARM REV CODE 250: Performed by: HOSPITALIST

## 2023-04-18 PROCEDURE — U0002 COVID-19 LAB TEST NON-CDC: HCPCS | Performed by: STUDENT IN AN ORGANIZED HEALTH CARE EDUCATION/TRAINING PROGRAM

## 2023-04-18 PROCEDURE — 97530 THERAPEUTIC ACTIVITIES: CPT

## 2023-04-18 RX ORDER — CLONIDINE HYDROCHLORIDE 0.1 MG/1
0.1 TABLET ORAL 3 TIMES DAILY
Status: DISCONTINUED | OUTPATIENT
Start: 2023-04-18 | End: 2023-04-19 | Stop reason: HOSPADM

## 2023-04-18 RX ORDER — ACETAMINOPHEN 500 MG
1000 TABLET ORAL 3 TIMES DAILY
Refills: 0
Start: 2023-04-19 | End: 2023-05-17

## 2023-04-18 RX ORDER — ASPIRIN 81 MG/1
81 TABLET ORAL DAILY
Status: DISCONTINUED | OUTPATIENT
Start: 2023-05-02 | End: 2023-04-19 | Stop reason: HOSPADM

## 2023-04-18 RX ORDER — ASPIRIN 81 MG/1
81 TABLET ORAL 2 TIMES DAILY
Status: DISCONTINUED | OUTPATIENT
Start: 2023-04-18 | End: 2023-04-19 | Stop reason: HOSPADM

## 2023-04-18 RX ORDER — CLONIDINE HYDROCHLORIDE 0.1 MG/1
0.1 TABLET ORAL 3 TIMES DAILY
Qty: 270 TABLET | Refills: 3 | Status: SHIPPED | OUTPATIENT
Start: 2023-04-18 | End: 2023-11-13 | Stop reason: SDUPTHER

## 2023-04-18 RX ORDER — ASPIRIN 81 MG/1
81 TABLET ORAL 2 TIMES DAILY
Qty: 180 TABLET | Refills: 3 | Status: SHIPPED | OUTPATIENT
Start: 2023-04-18 | End: 2024-04-17

## 2023-04-18 RX ADMIN — HEPARIN SODIUM 5000 UNITS: 5000 INJECTION INTRAVENOUS; SUBCUTANEOUS at 05:04

## 2023-04-18 RX ADMIN — HEPARIN SODIUM 5000 UNITS: 5000 INJECTION INTRAVENOUS; SUBCUTANEOUS at 02:04

## 2023-04-18 RX ADMIN — PANTOPRAZOLE SODIUM 40 MG: 40 TABLET, DELAYED RELEASE ORAL at 09:04

## 2023-04-18 RX ADMIN — CLONIDINE HYDROCHLORIDE 0.1 MG: 0.1 TABLET ORAL at 09:04

## 2023-04-18 RX ADMIN — ASPIRIN 81 MG: 81 TABLET, COATED ORAL at 02:04

## 2023-04-18 RX ADMIN — ASPIRIN 81 MG: 81 TABLET, COATED ORAL at 09:04

## 2023-04-18 RX ADMIN — HEPARIN SODIUM 5000 UNITS: 5000 INJECTION INTRAVENOUS; SUBCUTANEOUS at 09:04

## 2023-04-18 RX ADMIN — LISINOPRIL 20 MG: 20 TABLET ORAL at 09:04

## 2023-04-18 RX ADMIN — CLONIDINE HYDROCHLORIDE 0.1 MG: 0.1 TABLET ORAL at 02:04

## 2023-04-18 RX ADMIN — Medication 324 MG: at 09:04

## 2023-04-18 RX ADMIN — LEVOTHYROXINE SODIUM 75 MCG: 75 TABLET ORAL at 05:04

## 2023-04-18 RX ADMIN — CIPROFLOXACIN 500 MG: 500 TABLET, FILM COATED ORAL at 09:04

## 2023-04-18 RX ADMIN — Medication 324 MG: at 04:04

## 2023-04-18 RX ADMIN — HYDROCODONE BITARTRATE AND ACETAMINOPHEN 1 TABLET: 5; 325 TABLET ORAL at 03:04

## 2023-04-18 NOTE — PROGRESS NOTES
[facility] Medicine  Progress Note    Patient Name: Daniela Harris  MRN: [id number]  Patient Class: IP- Inpatient   Admission Date: 4/9/2023  Length of Stay: 9 days  Attending Physician: Tomas Chase MD  Primary Care Provider: Tayo Varela MD        Subjective:     Principal Problem:Closed fracture of neck of left femur        HPI:  This is an 83-year-old female with a past medical history of Alzheimer's dementia, hypertension, hyperlipidemia, CKD 4, hypothyroidism, PUD, who presents after a fall.    Patient had an unwitnessed fall the morning of presentation while she was at her nursing home.  Per the patient's son, Jayant, she was left sitting on the edge of the bed and later fell down to the floor, unknown if she hit her head.  She uses a walker to ambulate at baseline.  Patient has a history of dementia and is unable to contribute to the history.    In the ED, the patient was hypertensive.  Labs remarkable for leukocytosis (13.4), normocytic anemia (10.7-baseline 11-12), elevated creatinine (1.6-around baseline).  UA showed +3 leukocytes, > 100 WBCs, and many bacteria.  CT head/cervical spine showed no acute process.  Left CT hip showed a transcervical femoral neck fracture.  The patient was admitted for further management.      Overview/Hospital Course:  82 y/o female admitted with hip fracture.  Ortho consulted and underwent ORIF on 4/10.  PT/OT consulted.  Recommending SNF.  SW/CM consulted.      Interval History:   NAEON.  No new issues.   Denies complaints.  All questions answered and updates on care given.       Review of Systems   Unable to perform ROS: Dementia   Respiratory:  Negative for shortness of breath.    Cardiovascular:  Negative for chest pain.       Objective:     Vital Signs (Most Recent):  Temp: 98.3 °F (36.8 °C) (04/18/23 0746)  Pulse: 60 (04/18/23 0746)  Resp: 20 (04/18/23 0746)  BP: (!) 157/70 (04/18/23 0746)  SpO2: 98 % (04/18/23 0746)   Vital Signs (24h  Range):  Temp:  [98.3 °F (36.8 °C)-98.9 °F (37.2 °C)] 98.3 °F (36.8 °C)  Pulse:  [60-77] 60  Resp:  [15-20] 20  SpO2:  [96 %-98 %] 98 %  BP: (127-164)/(61-85) 157/70     Weight: 80.7 kg (178 lb)  Body mass index is 30.55 kg/m².    Intake/Output Summary (Last 24 hours) at 4/18/2023 1010  Last data filed at 4/18/2023 0830  Gross per 24 hour   Intake 650 ml   Output 1200 ml   Net -550 ml      Physical Exam  Vitals reviewed.   Constitutional:       General: She is not in acute distress.     Appearance: She is well-developed. She is not diaphoretic.   HENT:      Head: Normocephalic and atraumatic.   Eyes:      General: No scleral icterus.  Neck:      Thyroid: No thyromegaly.   Cardiovascular:      Rate and Rhythm: Normal rate and regular rhythm.      Heart sounds: No murmur heard.  Pulmonary:      Effort: Pulmonary effort is normal. No respiratory distress.      Breath sounds: Normal breath sounds. No stridor. No rales.   Abdominal:      General: There is no distension.      Palpations: Abdomen is soft. There is no mass.      Tenderness: There is no guarding.   Musculoskeletal:         General: Normal range of motion.      Cervical back: Normal range of motion and neck supple.   Skin:     General: Skin is warm and dry.      Capillary Refill: Capillary refill takes less than 2 seconds.   Neurological:      Mental Status: She is alert. Mental status is at baseline. She is disoriented.      Cranial Nerves: No cranial nerve deficit.      Gait: Gait abnormal.   Psychiatric:         Mood and Affect: Mood normal.         Behavior: Behavior normal.           No results found for this or any previous visit (from the past 24 hour(s)).    Microbiology Results (last 7 days)       ** No results found for the last 168 hours. **                       Assessment/Plan:      * Closed fracture of neck of left femur, non displaced   Presented after an unwitnessed fall.    Left CT hip showed a transcervical femoral neck fracture.  Plan:    Orthopedic surgery consulted.  S/P ORIF on 4/10  Pain control   DVT prophylaxis   PT/OT consult.  Recommending SNF.  CM/SW consulted.  Discharge to SNF once arranged.    Bacteriuria  Initially held off ABx's.  Low grade fever overnight.  Will treat with short course of oral Cipro.      CKD (chronic kidney disease) stage 4  At baseline.  Continue to monitor.      Late onset Alzheimer's disease without behavioral disturbance  No acute issues.      Hypothyroidism  Continue synthroid.      Hypertension  Resume home meds      Hyperlipidemia  Outpatient follow-up.        VTE Risk Mitigation (From admission, onward)         Ordered     heparin (porcine) injection 5,000 Units  Every 8 hours         04/09/23 1911     IP VTE HIGH RISK PATIENT  Once         04/09/23 1911     Place sequential compression device  Until discontinued         04/09/23 1911                Discharge Planning   ARNEL: 4/18/2023     Code Status: Full Code   Is the patient medically ready for discharge?: Yes    Reason for patient still in hospital (select all that apply): Pending disposition  Discharge Plan A: Skilled Nursing Facility   Discharge Delays: (!) Post-Acute Set-up              Tomas Chase MD  Department of Hospital Medicine   [facility]

## 2023-04-18 NOTE — PLAN OF CARE
[facility]     Department of Hospital Medicine     [address] [place] [address]     [phone] after hours  [phone] fax       REHAB    04/19/2023    Admit to Inpt Rehab                                     Diagnoses:  Active Hospital Problems    Diagnosis  POA    *Closed fracture of neck of left femur, non displaced  [S72.002A]  Yes     Priority: 1 - High    Bacteriuria [R82.71]  Yes    CKD (chronic kidney disease) stage 4 [N18.4]  Yes     Chronic    Late onset Alzheimer's disease without behavioral disturbance [G30.1, F02.80]  Yes     Chronic    Hypothyroidism [E03.9]  Yes     Chronic    Hypertension [I10]  Yes     Chronic    Hyperlipidemia [E78.5]  Yes      Resolved Hospital Problems   No resolved problems to display.       Patient is homebound due to:  Closed fracture of neck of left femur    Allergies:  Review of patient's allergies indicates:   Allergen Reactions    Cephalexin Rash     Other reaction(s): Rash       Vitals:       Routine     Diet: adult regular     Acitivities:   - Up in a chair each morning as tolerated   - Advance as tolerated     LABS:  Per facility protocol     Nursing Precautions:    - Aspiration precautions:               - Total assistance with meals              -  Upright 90 degrees befor during and after meals      - Fall precautions per nursing home protocol   - Decubitus precautions:              -  for positioning              - Pressure reducing foam mattress              - Turn patient every two hours. Use wedge pillows to anchor patient     CONSULTS:                Physical Therapy to evaluate and treat                 Occupational Therapy to evaluate and treat     MISCELLANEOUS CARE:                      Routine Skin for Bedridden Patients:  Apply moisture barrier cream to all                          skin folds and wet areas in perineal area daily and after baths and                           all bowel  movements.                   Medications: Discontinue all previous medication orders, if any. See new list below.       Medication List        START taking these medications      aspirin 81 MG EC tablet  Commonly known as: ECOTRIN  Take 1 tablet (81 mg total) by mouth 2 (two) times a day. Take 81 mg twice daily for 4 weeks, then once daily thereafter            CHANGE how you take these medications      acetaminophen 500 MG tablet  Commonly known as: TYLENOL EXTRA STRENGTH  Take 2 tablets (1,000 mg total) by mouth 3 (three) times daily.  Start taking on: April 19, 2023  What changed:   when to take this  reasons to take this     cloNIDine 0.1 MG tablet  Commonly known as: CATAPRES  Take 1 tablet (0.1 mg total) by mouth 3 (three) times daily.  What changed: when to take this            CONTINUE taking these medications      acetylcysteine 600 mg Cap  TAKE ONE CAPSULE BY MOUTH EVERY DAY FOR RESPIRATORY HEALTH     ascorbic acid (vitamin C) 500 MG tablet  Commonly known as: VITAMIN C  Take 1 tablet (500 mg total) by mouth once daily. 1 Tablet Oral Every day     b complex vitamins tablet  Take 1 tablet by mouth once daily.     calcium carbonate 600 mg calcium (1,500 mg) Tab  Commonly known as: OS-GRIFFIN  Take 1 tablet (600 mg total) by mouth once daily.     ferrous gluconate 324 MG tablet  Commonly known as: FERGON  TAKE ONE TABLET BY MOUTH TWICE DAILY WITH MEALS     glucosamine-chondroitn sulf.Na 750-400 mg Cmpk  Take 1 tablet by mouth once daily. 1  By mouth Every day     levothyroxine 75 MCG tablet  Commonly known as: SYNTHROID  TAKE ONE TABLET BY MOUTH IN THE MORNING ON AN EMPTY STOMACH     lisinopriL 20 MG tablet  Commonly known as: PRINIVIL,ZESTRIL  TAKE ONE TABLET BY MOUTH EVERY DAY     multivitamin per tablet  Commonly known as: THERAGRAN  Take 1 tablet by mouth once daily. 1 Tablet Oral Every day     pantoprazole 40 MG tablet  Commonly known as: PROTONIX  TAKE ONE TABLET BY MOUTH ONCE DAILY AS NEEDED             STOP taking these medications      tobramycin-dexAMETHasone 0.3-0.1% 0.3-0.1 % Drps  Commonly known as: TOBRADEX                    _________________________________  Tomas Chase MD

## 2023-04-18 NOTE — PT/OT/SLP PROGRESS
Physical Therapy Treatment    Patient Name:  Daniela Harris   MRN:  [id number]    Recommendations:     Discharge Recommendations: nursing facility, skilled  Discharge Equipment Recommendations:  (TBD)  Barriers to discharge home: Pt with decreased functional mobility and ambulation at this time.    Assessment:     Daniela Harris is a 83 y.o. female admitted with a medical diagnosis of Closed fracture of neck of left femur.  She presents with the following impairments/functional limitations: weakness, impaired endurance, impaired self care skills, gait instability, impaired balance, decreased upper extremity function, decreased lower extremity function, decreased ROM, impaired functional mobility, impaired cognition, decreased safety awareness, pain, edema, orthopedic precautions, decreased coordination.    Rehab Prognosis: Fair; patient would benefit from acute skilled PT services to address these deficits and reach maximum level of function.    Recent Surgery: Procedure(s) (LRB):  ORIF, HIP PINNING SYTHNES (Left) 8 Days Post-Op    Plan:     During this hospitalization, patient to be seen 5 x/week to address the identified rehab impairments via gait training, therapeutic activities, therapeutic exercises, wheelchair management/training and progress toward the following goals:    Plan of Care Expires:  04/25/23    Subjective     Chief Complaint: pain and fear  Patient/Family Comments/goals: Grand-dtr present and would like pt to try therapy.   Pain/Comfort:  Pain Rating 1:  (Pt grimaced during transitions, supine<>stand and sit<>stand.)  Pain Addressed 1: Reposition, Distraction, Cessation of Activity, Nurse notified (Pt received pain meds.)      Objective:     Patient found left sidelying with bed alarm, peripheral IV, SCD, pressure relief boots, PureWick (Avasys monitor) upon PT entry to room.     General Precautions: Standard, fall  Orthopedic Precautions: LLE weight bearing as tolerated  Braces: N/A  Respiratory  Status: Room air     Functional Mobility:  Pt alert, however with limited ability to follow simple commands.  Pt appeared fearful and anxious during supine<>sit and sit<>stand.  Once sitting, pt eventually able to maintain fair static sit balance, however continued to have difficulty with B foot placement/WB on the floor.  Pt very fearful during standing trials, therefore displayed resistance with assistance.  Grand-dtr present in room and trying to encourage and engage pt in activities.  Pt with decreased initiation and was disinterested in EOB activities.   Bed Mobility:     Rolling Left: dependence and of 2 persons  Scooting: dependence and of 2 persons  Bridging: dependence and of 2 persons  Supine to Sit: dependence and of 2 persons  Sit to Supine: dependence and of 2 persons  Transfers:     Sit to Stand: dependence and of 2 persons with hand-held assist and rolling walker x4 trials; Pt with difficulty achieving upright standing posture, mostly held in a squat position.  Pt had difficulty maintaining B foot placement on the floor.    Balance: Pt with fair/fair- static sit balance and poor static stand balance.  Initially, upon sitting EOB, pt with posterior lean and required max A and TC's to assist with trunk flex and B foot placement on the floor.  Over time, pt able to achieve fair static sit balance EOB with mostly RUE support on bed rail.      AM-PAC 6 CLICK MOBILITY  Turning over in bed (including adjusting bedclothes, sheets and blankets)?: 2  Sitting down on and standing up from a chair with arms (e.g., wheelchair, bedside commode, etc.): 2  Moving from lying on back to sitting on the side of the bed?: 2  Moving to and from a bed to a chair (including a wheelchair)?: 1  Need to walk in hospital room?: 1  Climbing 3-5 steps with a railing?: 1  Basic Mobility Total Score: 9       Treatment & Education:    Balance Training  Static Sitting:  Patient performed static sitting on  EOB  using  with RUE support  on bedrail  with Maximal Assistance initially then required only SBA and maximal verbal cues for trunk flex and B foot placement on floor.     Static Standing:  Patient performed static standing on level surface  using rolling walker with dep of 2 persons and maximal verbal cues for standing tolerance x4 trials.       PROM LLE in all available planes as tolerated: hip flex, hip abd/add, knee flex/ext, and ankle DF/PF    Pt unable to perform LE therex actively despite max VC's/TC's and demonstration, even with non-surgical RLE.  Grand-dtr present, also try to engage pt to perform LE therex, however was unsuccessful.    Grand-dtr educated on pt's positioning in bed and POC, verbalized understanding.     Patient left left sidelying and pillow between LE with all lines intact, call button in reach, bed alarm on, and grand-dtr and Avasys monitor present.  SCD/B pressure relief boots reapplied.     GOALS:   Multidisciplinary Problems       Physical Therapy Goals          Problem: Physical Therapy    Goal Priority Disciplines Outcome Goal Variances Interventions   Physical Therapy Goal     PT, PT/OT Ongoing, Progressing     Description: Goals to be met by: 23     Patient will increase functional independence with mobility by performin. Supine to sit with Moderate Assistance  2. Rolling to Left and Right with Moderate Assistance  3. Sit to stand transfer with Moderate Assistance using RW  4. Bed to chair transfer with Moderate Assistance   5. Gait ~20-30 feet with Moderate Assistance using Rolling Walker   6. Wheelchair propulsion ~ feet with Minimal Assistance using bilateral upper extremities  7. Lower extremity exercise program x10 reps per handout, with assistance as needed                         Time Tracking:     PT Received On: 23  PT Start Time: 1513     PT Stop Time: 1551  PT Total Time (min): 38 min     Billable Minutes: Therapeutic Activity 23 min and Therapeutic Exercise 15 min co-tx  with OT    Treatment Type: Treatment  PT/PTA: PT     Number of PTA visits since last PT visit: 0     04/18/2023

## 2023-04-18 NOTE — PLAN OF CARE
Problem: Adult Inpatient Plan of Care  Goal: Plan of Care Review  Outcome: Ongoing, Progressing  Goal: Patient-Specific Goal (Individualized)  Outcome: Ongoing, Progressing     Problem: Fall Injury Risk  Goal: Absence of Fall and Fall-Related Injury  Outcome: Ongoing, Progressing     Problem: Skin Injury Risk Increased  Goal: Skin Health and Integrity  Outcome: Ongoing, Progressing     Pt free from falls or any further trauma through out the shift. Prescribed medication administered. No complaint of pain. External catheter in place. Pt turned. Pt in no distress. Will continue to monitor.

## 2023-04-18 NOTE — SUBJECTIVE & OBJECTIVE
Interval History:   NAEON.  No new issues.   Denies complaints.  All questions answered and updates on care given.       Review of Systems   Unable to perform ROS: Dementia   Respiratory:  Negative for shortness of breath.    Cardiovascular:  Negative for chest pain.       Objective:     Vital Signs (Most Recent):  Temp: 98.3 °F (36.8 °C) (04/18/23 0746)  Pulse: 60 (04/18/23 0746)  Resp: 20 (04/18/23 0746)  BP: (!) 157/70 (04/18/23 0746)  SpO2: 98 % (04/18/23 0746)   Vital Signs (24h Range):  Temp:  [98.3 °F (36.8 °C)-98.9 °F (37.2 °C)] 98.3 °F (36.8 °C)  Pulse:  [60-77] 60  Resp:  [15-20] 20  SpO2:  [96 %-98 %] 98 %  BP: (127-164)/(61-85) 157/70     Weight: 80.7 kg (178 lb)  Body mass index is 30.55 kg/m².    Intake/Output Summary (Last 24 hours) at 4/18/2023 1010  Last data filed at 4/18/2023 0830  Gross per 24 hour   Intake 650 ml   Output 1200 ml   Net -550 ml      Physical Exam  Vitals reviewed.   Constitutional:       General: She is not in acute distress.     Appearance: She is well-developed. She is not diaphoretic.   HENT:      Head: Normocephalic and atraumatic.   Eyes:      General: No scleral icterus.  Neck:      Thyroid: No thyromegaly.   Cardiovascular:      Rate and Rhythm: Normal rate and regular rhythm.      Heart sounds: No murmur heard.  Pulmonary:      Effort: Pulmonary effort is normal. No respiratory distress.      Breath sounds: Normal breath sounds. No stridor. No rales.   Abdominal:      General: There is no distension.      Palpations: Abdomen is soft. There is no mass.      Tenderness: There is no guarding.   Musculoskeletal:         General: Normal range of motion.      Cervical back: Normal range of motion and neck supple.   Skin:     General: Skin is warm and dry.      Capillary Refill: Capillary refill takes less than 2 seconds.   Neurological:      Mental Status: She is alert. Mental status is at baseline. She is disoriented.      Cranial Nerves: No cranial nerve deficit.      Gait:  Gait abnormal.   Psychiatric:         Mood and Affect: Mood normal.         Behavior: Behavior normal.           No results found for this or any previous visit (from the past 24 hour(s)).    Microbiology Results (last 7 days)       ** No results found for the last 168 hours. **

## 2023-04-18 NOTE — PLAN OF CARE
JEFF notified patient's daughter in law, Araceli that [facility] declined due to increasing Covid cases in their facility. Araceli stated that they are not interested in any other facility in [place]. Araceli stated that their family is not afraid of Covid and that she will sign a waiver or something to get patient in the facility. Araceli stated that she was going to call [facility] and call SW back.     10:55 am     JEFF followed up with Araceli about [facility]. Araceli stated that she spoke with them and they explained that it is against state regulations. Araceli inquired if SW could follow up on two more facilities, [facility] and [facility]. JEFF informed that referrals were sent yesterday and SW will follow up. Araceli also inquired if Mallory from [facility] called. JEFF said no. Araceli stated that she spoke to them yesterday and they said that they are going to call. JEFF explained that in order for patient to go to Rehab, she has to have a medical need to see a physician daily. JEFF stated that facilities agree to accept on phone and when they get the referral and see what's going on with patient, they decline. JEFF stated that she will send the referral. JEFF informed Araceli that it patient is declined, the family will have to choose from one of the accepting facilities or take patient home with them. Araceli stated that she understood. JEFF sent referral to [facility]. JEFF followed up with [facility]. Dianelys stated that she had not looked at the referral yet and will call SW back. JEFF called [facility]. Admissions was not available. JEFF left a message.     11:14 am     Rolando with [facility] called JEFF to inform that they received the referral and will come and evaluate patient today. JEFF informed her that patient is medically stable and ready for discharge.    2:04 pm     Araceli called JEFF and inquired if she could send referrals to three more facilities. Araceli stated that she didn't like a review she read about [facility]. Araceli stated that they  are thinking about brining patient back to assisted living until they are able to get her in [facility]. JEFF informed Araceli that home health can be coordinated for patient. JEFF informed that it will be hard to coordinate a home to SNF. JEFF stated that patient's PCP will have to write orders. JEFF informed that patient can go from home to rehab. Araceli stated that they are looking at all of their options. JEFF stated that she spoke with someone from [facility] and informed that they are coming evaluate patient.     2:17 pm      Mallory,  with [facility] contacted JEFF and inquired if Chitra had reached out. JEFF informed Mallory that Rolando reached out this morning and informed that someone was coming out to evaluate patient. Mallory stated that she will call Tiff[facility] to see what the decision is and call JEFF back.    2:54 pm     Patient accepted to [facility]. Facility will be ready for her tomorrow. JEFF notified Dr. Chase. Patient will need Rapid Covid Test.

## 2023-04-18 NOTE — NURSING
[facility]  Nurses Note -- 4 Eyes      4/17/2023       Skin assessed on: Q Shift      [x] No Pressure Injuries Present    []Prevention Measures Documented    [] Yes LDA  for Pressure Injury Previously documented     [] Yes New Pressure Injury Discovered   [] LDA for New Pressure Injury Added      Attending RN:  Pan Clement RN     Second RN:  SERJIO Capps

## 2023-04-18 NOTE — PLAN OF CARE
Problem: Adult Inpatient Plan of Care  Goal: Plan of Care Review  Outcome: Ongoing, Progressing  Goal: Patient-Specific Goal (Individualized)  Outcome: Ongoing, Progressing  Goal: Absence of Hospital-Acquired Illness or Injury  Outcome: Ongoing, Progressing  Goal: Optimal Comfort and Wellbeing  Outcome: Ongoing, Progressing  Goal: Readiness for Transition of Care  Outcome: Ongoing, Progressing     Problem: Fall Injury Risk  Goal: Absence of Fall and Fall-Related Injury  Outcome: Ongoing, Progressing  Intervention: Identify and Manage Contributors  Flowsheets (Taken 4/18/2023 1636)  Self-Care Promotion: BADL personal objects within reach  Medication Review/Management: medications reviewed  Intervention: Promote Injury-Free Environment  Flowsheets (Taken 4/18/2023 1636)  Safety Promotion/Fall Prevention:   assistive device/personal item within reach   bed alarm set   Fall Risk reviewed with patient/family   Fall Risk signage in place   nonskid shoes/socks when out of bed   room near unit station   /camera at bedside   side rails raised x 3     Problem: Skin Injury Risk Increased  Goal: Skin Health and Integrity  Outcome: Ongoing, Progressing  Intervention: Optimize Skin Protection  Flowsheets (Taken 4/18/2023 1636)  Pressure Reduction Techniques: weight shift assistance provided  Pressure Reduction Devices: heel offloading device utilized  Skin Protection:   adhesive use limited   hydrocolloids used  Head of Bed (HOB) Positioning: HOB elevated  Intervention: Promote and Optimize Oral Intake  Flowsheets (Taken 4/18/2023 1636)  Oral Nutrition Promotion: calorie-dense foods provided   Pt A&Ox1 (disoriented to situation,time,place), free from falls/injury. VSS. Pt had c/o pain during shift. PRN pain medication given with relief. No acute distress noted, telesitter at the bedside. Bed locked and in lowest position. Bedside table and call light in reach. Will cont to monitor.

## 2023-04-18 NOTE — PT/OT/SLP PROGRESS
Occupational Therapy   Treatment    Name: Daniela Harris  MRN: [id number]  Admitting Diagnosis:  Closed fracture of neck of left femur  8 Days Post-Op    Recommendations:     Discharge Recommendations: nursing facility, skilled  Discharge Equipment Recommendations:   (TBD)  Barriers to discharge:   (s/p fracture, requiring total A with care at this time)    Assessment:     Daniela Harris is a 83 y.o. female with a medical diagnosis of Closed fracture of neck of left femur. Performance deficits affecting function are weakness, impaired endurance, impaired cognition, decreased ROM, decreased coordination, orthopedic precautions, decreased upper extremity function, impaired self care skills, impaired fine motor, impaired skin, decreased lower extremity function, impaired functional mobility, edema, decreased safety awareness, gait instability, impaired balance, pain.     Rehab Prognosis:  Fair/-; patient would benefit from acute skilled OT services to address these deficits and reach maximum level of function.       Plan:     Patient to be seen 5 x/week to address the above listed problems via self-care/home management, therapeutic activities, therapeutic exercises  Plan of Care Expires: 04/25/23  Plan of Care Reviewed with: patient, grandchild(sonja)    Subjective     Chief Complaint: resisting movements  Patient/Family Comments/goals: granddaughter, Forrest, engaged in session and hopeful she can do more once she isn't in pain    Pain/Comfort:  Pain Rating 1:  (pt grimaced in pain with movement; resisting movement to all extremities)  Pain Addressed 1: Pre-medicate for activity, Reposition, Distraction, Cessation of Activity, Nurse notified  Pain Rating Post-Intervention 1:  (pt smiling in the bed)    Objective:     Communicated with: nurseOpal, during session.  Patient found  HOB elevated L sidelying  with bed alarm, peripheral IV, pressure relief boots, PureWick, SCD (avasys) upon OT entry to room.    General  Precautions: Standard, fall    Orthopedic Precautions:LLE weight bearing as tolerated  Braces: N/A  Respiratory Status: Room air     Occupational Performance:     Bed Mobility:    Patient completed Rolling/Turning to Left with  dependent  Patient completed Scooting anteriorly with dependent and 2 persons  Patient completed Supine to Sit with dependent, 2 persons, and HOB elevated  Patient completed Sit to Supine with dependent and 2 persons   Patient completed Scooting in supine with bed in trendelenburg position with dependent and 2 persons    Functional Mobility/Transfers:  Patient completed Sit <> Stand Transfer with dependence and of 2 persons  with  rolling walker x3 trials; dependent x2 with BUE supported and B/L knees blocked x1 trial  Functional Mobility: gait belt donned prior to transfers. Pt required total A for B/L LE, UE placement for set-up with each transfer. Pt with posterior lean prior to standing. Fair/- sit balance; poor standing balance.     Activities of Daily Living:  Feeding:  hand-over-hand assist to place water cup in pt's hands while seated EOB with CGA for sit balance. Pt able to sustain grasp to cup with CGA as she took sips of water. Pt had assistance by nurse for apple juice while taking pain medication while sitting EOB- SBA sit balance. Pt with difficulty following command to release straw from her mouth.     Lower Body Dressing: total assistance to adjust socks       AMPAC 6 Click ADL: 7    Treatment & Education:  Pt re-educated on OT role.  Safety during functional t/f and mobility   Pt easily distracted by things seated at EOB. Pt spontaneously completed things, like using LUE to take the BP cuff off the side rail. After cueing for pt for functional reach, trunk flexion, and weight bear through BLE to place cuff on the RW, pt unable to follow command. Pt with difficulty following command to then hand it to her granddaughter instead. Pt tolerated ~partial AAROM/PROM RUE shoulder  flex/ext and shoulder horizontal ab/dduction x10 reps each, but also resisting at times making it difficult to assist pt.   Upon sitting EOB, pt with posterior lean with supported sit required and pt holding granddaughter's hands. Pt able to advance to fair/- sit balance with time. Granddaughter prompted to sit next to pt at EOB to encourage pt to have upright posture. Attempted functional reach to granddaughter when she sat in front of pt; however, pt unable to follow commands for completion.   Self-care task/functional mobility completed- assistance level noted above   All questions/concerns answered within OT scope of practice       Patient left  HOB elevated L sidelying with LUE elevated on pillow, pillow in-between BLE, B/L SCDs and pressure relief boots on, purewick in place  with all lines intact, call button in reach, bed alarm on, nursing notified, granddaughter and avasys present, and all needs met/within reach; lights on, blinds opened.     GOALS:   Multidisciplinary Problems       Occupational Therapy Goals          Problem: Occupational Therapy    Goal Priority Disciplines Outcome Interventions   Occupational Therapy Goal     OT, PT/OT Ongoing, Progressing    Description: Goals to be met by: 04/25/23     Patient will increase functional independence with ADLs by performing:    Feeding with Minimal Assistance (finger foods).  Patient will stand statically for 2 min with Minimal assistance to assist with toileting for hygiene and clothing management.   Sitting at edge of bed x15 minutes with Supervision.  Supine to sit with Minimal Assistance.  Step transfer with Minimal Assistance  Toilet transfer to bedside commode with Minimal Assistance.  Upper extremity exercise program x15 reps per handout, with assistance as needed.                         Time Tracking:     OT Date of Treatment: 04/18/23  OT Start Time: 1513  OT Stop Time: 1551  OT Total Time (min): 38 min    Billable Minutes:Therapeutic Activity  28 min  Therapeutic Exercise 10 min  Total Time 38 min (co-treat with PT)    Co- treatment performed due to patient's multiple deficits requiring two skilled therapists to appropriately and safely assess patient's strength and endurance while facilitating functional tasks in addition to accommodating for patient's activity tolerance    OT/MAGGY: OT     Number of MAGGY visits since last OT visit: 0    4/18/2023

## 2023-04-19 VITALS
HEIGHT: 64 IN | HEART RATE: 50 BPM | SYSTOLIC BLOOD PRESSURE: 122 MMHG | WEIGHT: 177.69 LBS | RESPIRATION RATE: 17 BRPM | DIASTOLIC BLOOD PRESSURE: 57 MMHG | OXYGEN SATURATION: 94 % | BODY MASS INDEX: 30.34 KG/M2 | TEMPERATURE: 98 F

## 2023-04-19 PROCEDURE — 25000003 PHARM REV CODE 250: Performed by: STUDENT IN AN ORGANIZED HEALTH CARE EDUCATION/TRAINING PROGRAM

## 2023-04-19 PROCEDURE — 63600175 PHARM REV CODE 636 W HCPCS: Performed by: STUDENT IN AN ORGANIZED HEALTH CARE EDUCATION/TRAINING PROGRAM

## 2023-04-19 RX ADMIN — LISINOPRIL 20 MG: 20 TABLET ORAL at 08:04

## 2023-04-19 RX ADMIN — ASPIRIN 81 MG: 81 TABLET, COATED ORAL at 08:04

## 2023-04-19 RX ADMIN — POLYETHYLENE GLYCOL 3350 17 G: 17 POWDER, FOR SOLUTION ORAL at 08:04

## 2023-04-19 RX ADMIN — PANTOPRAZOLE SODIUM 40 MG: 40 TABLET, DELAYED RELEASE ORAL at 08:04

## 2023-04-19 RX ADMIN — LEVOTHYROXINE SODIUM 75 MCG: 75 TABLET ORAL at 05:04

## 2023-04-19 RX ADMIN — HEPARIN SODIUM 5000 UNITS: 5000 INJECTION INTRAVENOUS; SUBCUTANEOUS at 05:04

## 2023-04-19 RX ADMIN — Medication 324 MG: at 08:04

## 2023-04-19 RX ADMIN — CLONIDINE HYDROCHLORIDE 0.1 MG: 0.1 TABLET ORAL at 08:04

## 2023-04-19 NOTE — PLAN OF CARE
04/19/23 0854   Medicare Message   Important Message from Medicare regarding Discharge Appeal Rights Given to patient/caregiver;Explained to patient/caregiver;Other (comments)  (Explained IMM to daughter in law. Daughter in law expressed understanding. Copy emailed to [email or web address])   Date IMM was signed 04/19/23   Time IMM was signed 0850

## 2023-04-19 NOTE — PLAN OF CARE
Follow-up Information       Ml Hernandez III, MD. Go on 4/24/2023.    Specialty: Orthopedic Surgery  Why: Hospital F/U with Ortho at 1:45 pm.  Contact information:  [address]  [address]  [place] [id number] [phone]

## 2023-04-19 NOTE — DISCHARGE SUMMARY
[facility] Medicine  Discharge Summary      Patient Name: Daniela Harris  MRN: [id number]  GLORIA: [id number]  Patient Class: IP- Inpatient  Admission Date: 4/9/2023  Hospital Length of Stay: 10 days  Discharge Date and Time:  04/19/2023 8:03 AM  Attending Physician: Tomas Chase MD   Discharging Provider: Tomas Chase MD  Primary Care Provider: Tayo Varela MD    Primary Care Team: Networked reference to record PCT     HPI:   This is an 83-year-old female with a past medical history of Alzheimer's dementia, hypertension, hyperlipidemia, CKD 4, hypothyroidism, PUD, who presents after a fall.    Patient had an unwitnessed fall the morning of presentation while she was at her nursing home.  Per the patient's son, Jayant, she was left sitting on the edge of the bed and later fell down to the floor, unknown if she hit her head.  She uses a walker to ambulate at baseline.  Patient has a history of dementia and is unable to contribute to the history.    In the ED, the patient was hypertensive.  Labs remarkable for leukocytosis (13.4), normocytic anemia (10.7-baseline 11-12), elevated creatinine (1.6-around baseline).  UA showed +3 leukocytes, > 100 WBCs, and many bacteria.  CT head/cervical spine showed no acute process.  Left CT hip showed a transcervical femoral neck fracture.  The patient was admitted for further management.      Procedure(s) (LRB):  ORIF, HIP PINNING SYTHNES (Left)      Hospital Course:   84 y/o female admitted with hip fracture.  Ortho consulted and underwent ORIF on 4/10.  PT/OT consulted.  Recommending SNF.  SW/CM consulted.     Accepted into REHAB.    Take aspirin 81 mg twice daily for 4 weeks, then reduce to once daily  Blood pressure has been a bit high on your normal dose, increase clonidine from 2 to 3 times a day  Follow-up with PCP and Orthopedics  Inpt REHAB set up for you    Thank you for involving me with your care, let me know if there is anything more I can  do!        Tomas Chase MD  Internal Medicine Staff      Review of Systems   Unable to perform ROS: Dementia   Respiratory:  Negative for shortness of breath.    Cardiovascular:  Negative for chest pain.         Objective:      Vitals:    04/18/23 2323 04/19/23 0416 04/19/23 0500 04/19/23 0732   BP: (!) 134/58 132/60  (!) 149/67   BP Location: Left arm Left arm  Left arm   Patient Position: Lying Lying  Lying   Pulse: 61 (!) 55  (!) 54   Resp: 19 18 17   Temp: 98.1 °F (36.7 °C) 98 °F (36.7 °C)  97.9 °F (36.6 °C)   TempSrc: Oral Oral  Oral   SpO2: 95% 98%  (!) 93%   Weight:   80.6 kg (177 lb 11.1 oz)    Height:         Body mass index is 30.55 kg/m².     Intake/Output Summary (Last 24 hours) at 4/18/2023 1010  Last data filed at 4/18/2023 0830      Gross per 24 hour   Intake 650 ml   Output 1200 ml   Net -550 ml      Physical Exam  Vitals reviewed.   Constitutional:       General: She is not in acute distress.     Appearance: She is well-developed. She is not diaphoretic.   HENT:      Head: Normocephalic and atraumatic.   Eyes:      General: No scleral icterus.  Neck:      Thyroid: No thyromegaly.   Cardiovascular:      Rate and Rhythm: Normal rate and regular rhythm.      Heart sounds: No murmur heard.  Pulmonary:      Effort: Pulmonary effort is normal. No respiratory distress.      Breath sounds: Normal breath sounds. No stridor. No rales.   Abdominal:      General: There is no distension.      Palpations: Abdomen is soft. There is no mass.      Tenderness: There is no guarding.   Musculoskeletal:         General: Normal range of motion.      Cervical back: Normal range of motion and neck supple.   Skin:     General: Skin is warm and dry.      Capillary Refill: Capillary refill takes less than 2 seconds.   Neurological:      Mental Status: She is alert. Mental status is at baseline. She is disoriented.      Cranial Nerves: No cranial nerve deficit.      Gait: Gait abnormal.   Psychiatric:         Mood and  Affect: Mood normal.         Behavior: Behavior normal.           Goals of Care Treatment Preferences:  Code Status: Full Code       LaPOST: Yes           Consults:   Consults (From admission, onward)          Status Ordering Provider     Inpatient consult to Social Work  Once        Provider:  (Not yet assigned)    NILO Griffiths     Inpatient consult to Orthopedic Surgery  Once        Provider:  MD Mercy Gonzáles OMAR            No new Assessment & Plan notes have been filed under this hospital service since the last note was generated.  Service: Hospital Medicine    Final Active Diagnoses:    Diagnosis Date Noted POA    PRINCIPAL PROBLEM:  Closed fracture of neck of left femur, non displaced  [S72.002A] 04/09/2023 Yes    Bacteriuria [R82.71] 04/14/2023 Yes    CKD (chronic kidney disease) stage 4 [N18.4] 08/02/2022 Yes     Chronic    Late onset Alzheimer's disease without behavioral disturbance [G30.1, F02.80] 05/23/2017 Yes     Chronic    Hypothyroidism [E03.9] 12/02/2014 Yes     Chronic    Hypertension [I10] 08/14/2012 Yes     Chronic    Hyperlipidemia [E78.5] 08/14/2012 Yes      Problems Resolved During this Admission:       Discharged Condition: poor    Disposition: Rehab     Follow Up:    Patient Instructions:      Ambulatory referral/consult to Outpatient Case Management   Referral Priority: Routine Referral Type: Consultation   Referral Reason: Specialty Services Required   Number of Visits Requested: 1     Ambulatory referral/consult to Internal Medicine   Standing Status: Future   Referral Priority: Routine Referral Type: Consultation   Referral Reason: Specialty Services Required   Requested Specialty: Internal Medicine   Number of Visits Requested: 1     Ambulatory referral/consult to Orthopedics   Standing Status: Future   Referral Priority: Routine Referral Type: Consultation   Requested Specialty: Orthopedic Surgery   Number of Visits Requested: 1       Significant  Diagnostic Studies:     Recent Results (from the past 100 hour(s))   COVID-19 Rapid Screening    Collection Time: 04/18/23  4:04 PM   Result Value Ref Range    SARS-CoV-2 RNA, Amplification, Qual Negative Negative       Microbiology Results (last 7 days)       ** No results found for the last 168 hours. **                    Pending Diagnostic Studies:       None           Medications:  Reconciled Home Medications:      Medication List        START taking these medications      aspirin 81 MG EC tablet  Commonly known as: ECOTRIN  Take 1 tablet (81 mg) by mouth twice daily for 4 weeks, then take 1 tablet by mouth once daily thereafter            CHANGE how you take these medications      acetaminophen 500 MG tablet  Commonly known as: TYLENOL EXTRA STRENGTH  Take 2 tablets (1,000 mg total) by mouth 3 (three) times daily.  What changed:   when to take this  reasons to take this     cloNIDine 0.1 MG tablet  Commonly known as: CATAPRES  Take 1 tablet (0.1 mg total) by mouth 3 (three) times daily.  What changed: when to take this            CONTINUE taking these medications      acetylcysteine 600 mg Cap  TAKE ONE CAPSULE BY MOUTH EVERY DAY FOR RESPIRATORY HEALTH     ascorbic acid (vitamin C) 500 MG tablet  Commonly known as: VITAMIN C  Take 1 tablet (500 mg total) by mouth once daily. 1 Tablet Oral Every day     b complex vitamins tablet  Take 1 tablet by mouth once daily.     calcium carbonate 600 mg calcium (1,500 mg) Tab  Commonly known as: OS-GRIFFIN  Take 1 tablet (600 mg total) by mouth once daily.     ferrous gluconate 324 MG tablet  Commonly known as: FERGON  TAKE ONE TABLET BY MOUTH TWICE DAILY WITH MEALS     glucosamine-chondroitn sulf.Na 750-400 mg Cmpk  Take 1 tablet by mouth once daily. 1  By mouth Every day     levothyroxine 75 MCG tablet  Commonly known as: SYNTHROID  TAKE ONE TABLET BY MOUTH IN THE MORNING ON AN EMPTY STOMACH     lisinopriL 20 MG tablet  Commonly known as: PRINIVIL,ZESTRIL  TAKE ONE TABLET  BY MOUTH EVERY DAY     multivitamin per tablet  Commonly known as: THERAGRAN  Take 1 tablet by mouth once daily. 1 Tablet Oral Every day     pantoprazole 40 MG tablet  Commonly known as: PROTONIX  TAKE ONE TABLET BY MOUTH ONCE DAILY AS NEEDED            STOP taking these medications      tobramycin-dexAMETHasone 0.3-0.1% 0.3-0.1 % Drps  Commonly known as: TOBRADEX              Indwelling Lines/Drains at time of discharge:   Lines/Drains/Airways       Drain  Duration             Female External Urinary Catheter 04/12/23 1800 6 days                    Time spent on the discharge of patient: 35 minutes         Tomas Chase MD  Department of Hospital Medicine  [facility]

## 2023-04-19 NOTE — PLAN OF CARE
Problem: Adult Inpatient Plan of Care  Goal: Plan of Care Review  Outcome: Ongoing, Progressing     Problem: Fall Injury Risk  Goal: Absence of Fall and Fall-Related Injury  Outcome: Ongoing, Progressing     Problem: Skin Injury Risk Increased  Goal: Skin Health and Integrity  Outcome: Ongoing, Progressing     Problem: Impaired Wound Healing  Goal: Optimal Wound Healing  Outcome: Ongoing, Progressing     Pt free form falls or any further trauma through out the shift. Prescribed medication administered. External catheter in place. Pt in no distress. Will continue to monitor.

## 2023-04-19 NOTE — DISCHARGE INSTRUCTIONS
Take aspirin 81 mg twice daily for 4 weeks, then reduce to once daily  Blood pressure has been a bit high on your normal dose, increase clonidine from 2 to 3 times a day  Follow-up with PCP and Orthopedics  Inpt REHAB set up for you    Thank you for involving me with your care, let me know if there is anything more I can do!        Tomas Chase MD  Internal Medicine Staff        PATIENT GENERAL DISCHARGE INFORMATION   Things that YOU are responsible for to Manage Your Care At Home:  1. Getting your prescriptions filled.  2. Taking you medications as directed. (DO NOT MISS ANY DOSES!)  3. Going to your follow-up doctor appointments.                 *This is important because it allows the doctor to monitor your progress and make changes.      If you are unable to make your follow up appointments, please call the number listed and reschedule this appointment.   After discharge, if you need assistance, you can call [facility] On Call Nurse Care Line for 24/7 assistance at [phone]  If you are experience any signs or symptoms, Call your doctor or Call 911 and come to your nearest Emergency Room.    You should receive a call from [facility] Discharge Department within 48-72 hours to help manage your care after discharge.   Please try to make sure that you answer your phone for this important phone call.

## 2023-04-19 NOTE — NURSING
Patient discharged to rehab facility per MD order.  IV removed.  Catheter tip intact.  No distress noted. AVS given to facility. VSS. Afebrile.  No complaints of pain, N/V, diarrhea, or SOB. Patient left with belongings to Main Entrance via wheelchair per [facility] Van transport.

## 2023-04-19 NOTE — PLAN OF CARE
Patient discharging to [facility] for rehab. Araceli notified of discharge and time of transport. SW notified nurse Opal that patient is ready for discharge from case management standpoint.     Call report to [phone] Option 4; Ask for charge nurse. Please call at 10:00 am.     ADT 30 order placed for Van Transportation.  Requested  time:  If transportation does not arrive at ETA time nurse will be instructed to follow protocol for transportation below: 10:30 am ([facility])  How can I get in touch directly with dispatch, if needed?                 Non-emergent dispatch: [phone]      +++NURSING:  If Van does not arrive at requested time please call the above Non Emergent Dispatcher.  If issue not resolved please escalate to your charge nurse for further instructions.       04/19/23 0856   Final Note   Assessment Type Final Discharge Note   Anticipated Discharge Disposition Rehab   What phone number can be called within the next 1-3 days to see how you are doing after discharge? [phone]   Hospital Resources/Appts/Education Provided Appointments scheduled and added to AVS;Appointments scheduled by Navigator/Coordinator   Post-Acute Status   Post-Acute Authorization Placement  (Rehab)   Post-Acute Placement Status Set-up Complete/Auth obtained   Coverage Medicare AB   Other Status No Post-Acute Service Needs   Discharge Delays None known at this time

## 2023-04-25 ENCOUNTER — OUTPATIENT CASE MANAGEMENT (OUTPATIENT)
Dept: ADMINISTRATIVE | Facility: OTHER | Age: 83
End: 2023-04-25
Payer: MEDICARE

## (undated) DEVICE — APPLICATOR CHLORAPREP ORN 26ML

## (undated) DEVICE — BLANKET UPPER BODY 78.7X29.9IN

## (undated) DEVICE — GAUZE SPONGE 4X4 12PLY

## (undated) DEVICE — DRESSING TRANS 4X4 TEGADERM

## (undated) DEVICE — SUT CTD VICRYL 0 UND BR CT

## (undated) DEVICE — ELECTRODE REM PLYHSV RETURN 9

## (undated) DEVICE — STAPLER SKIN PROXIMATE WIDE

## (undated) DEVICE — DRAPE C-ARM FOR MOBILE XRAY

## (undated) DEVICE — Device

## (undated) DEVICE — DRESSING GAUZE XEROFORM 5X9

## (undated) DEVICE — BANDAGE ELAS SOFTWRAP ST 6X5YD

## (undated) DEVICE — TOWEL OR DISP STRL BLUE 4/PK

## (undated) DEVICE — SEE MEDLINE ITEM 146292

## (undated) DEVICE — SOL NACL IRR 1000ML BTL

## (undated) DEVICE — SEE MEDLINE ITEM 107746

## (undated) DEVICE — GLOVE SURGICAL LATEX SZ 6.5

## (undated) DEVICE — DRAPE STERI-DRAPE 83X125 IOBAN

## (undated) DEVICE — UNDERGLOVE BIOGEL PI SZ 6.5 LF

## (undated) DEVICE — SUT VICRYL 2-0 36 CT-1

## (undated) DEVICE — DRAPE C-ARMOR EQUIPMENT COVER

## (undated) DEVICE — KIT PT CARE HANA PROFX SSXT

## (undated) DEVICE — SEE MEDLINE ITEM 157110